# Patient Record
Sex: FEMALE | Race: WHITE | NOT HISPANIC OR LATINO | Employment: OTHER | URBAN - METROPOLITAN AREA
[De-identification: names, ages, dates, MRNs, and addresses within clinical notes are randomized per-mention and may not be internally consistent; named-entity substitution may affect disease eponyms.]

---

## 2017-02-15 ENCOUNTER — OFFICE VISIT (OUTPATIENT)
Dept: GYNECOLOGIC ONCOLOGY | Facility: CLINIC | Age: 67
End: 2017-02-15
Payer: MEDICARE

## 2017-02-15 ENCOUNTER — TELEPHONE (OUTPATIENT)
Dept: GYNECOLOGIC ONCOLOGY | Facility: CLINIC | Age: 67
End: 2017-02-15

## 2017-02-15 ENCOUNTER — LAB VISIT (OUTPATIENT)
Dept: LAB | Facility: HOSPITAL | Age: 67
End: 2017-02-15
Attending: OBSTETRICS & GYNECOLOGY
Payer: MEDICARE

## 2017-02-15 VITALS
BODY MASS INDEX: 22.31 KG/M2 | WEIGHT: 130 LBS | DIASTOLIC BLOOD PRESSURE: 70 MMHG | SYSTOLIC BLOOD PRESSURE: 156 MMHG | HEART RATE: 68 BPM

## 2017-02-15 DIAGNOSIS — R97.1 ELEVATED CANCER ANTIGEN 125 (CA-125): Primary | ICD-10-CM

## 2017-02-15 DIAGNOSIS — Z91.041 ALLERGY TO CONTRAST MEDIA (USED FOR DIAGNOSTIC X-RAYS): ICD-10-CM

## 2017-02-15 DIAGNOSIS — C48.2 PRIMARY PERITONEAL ADENOCARCINOMA: ICD-10-CM

## 2017-02-15 DIAGNOSIS — C48.2 PRIMARY PERITONEAL ADENOCARCINOMA: Primary | ICD-10-CM

## 2017-02-15 DIAGNOSIS — Z12.31 SCREENING MAMMOGRAM, ENCOUNTER FOR: ICD-10-CM

## 2017-02-15 DIAGNOSIS — Z91.041 ALLERGY TO CONTRAST MEDIA (USED FOR DIAGNOSTIC X-RAYS): Primary | ICD-10-CM

## 2017-02-15 DIAGNOSIS — Z01.419 WELL WOMAN EXAM WITH ROUTINE GYNECOLOGICAL EXAM: ICD-10-CM

## 2017-02-15 LAB — CANCER AG125 SERPL-ACNC: 56 U/ML

## 2017-02-15 PROCEDURE — 99213 OFFICE O/P EST LOW 20 MIN: CPT | Mod: PBBFAC | Performed by: OBSTETRICS & GYNECOLOGY

## 2017-02-15 PROCEDURE — 99999 PR PBB SHADOW E&M-EST. PATIENT-LVL III: CPT | Mod: PBBFAC,,, | Performed by: OBSTETRICS & GYNECOLOGY

## 2017-02-15 PROCEDURE — 99214 OFFICE O/P EST MOD 30 MIN: CPT | Mod: S$PBB,,, | Performed by: OBSTETRICS & GYNECOLOGY

## 2017-02-15 RX ORDER — PREDNISONE 50 MG/1
50 TABLET ORAL
Qty: 3 TABLET | Refills: 0 | OUTPATIENT
Start: 2017-02-15 | End: 2017-02-15 | Stop reason: SDUPTHER

## 2017-02-15 RX ORDER — PREDNISONE 50 MG/1
50 TABLET ORAL DAILY
Qty: 3 TABLET | Refills: 0 | OUTPATIENT
Start: 2017-02-15 | End: 2017-02-16

## 2017-02-15 RX ORDER — DIPHENHYDRAMINE HCL 25 MG
50 CAPSULE ORAL EVERY 6 HOURS PRN
Refills: 0 | Status: CANCELLED | COMMUNITY
Start: 2017-02-15

## 2017-02-15 NOTE — MR AVS SNAPSHOT
[facility] - GYN Oncology  [address]  [facility] [address]  Phone: [phone]                  Murlene Mary Haase   2/15/2017 8:45 AM   Office Visit    Description:  Female : 1950   Provider:  Juan Foss MD   Department:  [facility] - GYN Oncology           Reason for Visit     Primary peritoneal adenocarcinoma           Diagnoses this Visit        Comments    Primary peritoneal adenocarcinoma    -  Primary     Well woman exam with routine gynecological exam         Screening mammogram, encounter for                To Do List           Future Appointments        Provider Department Dept Phone    2/15/2017 8:45 AM Juan Foss MD [facility] - GYN Oncology [phone]      Goals (5 Years of Data)     None      Follow-Up and Disposition     Return in about 3 months (around 5/15/2017).      [facility]     [facility] On Call Nurse Care Line -  Assistance  Registered nurses in the [facility] On Call Center provide clinical advisement, health education, appointment booking, and other advisory services.  Call for this free service at [phone].             Medications                Verify that the below list of medications is an accurate representation of the medications you are currently taking.  If none reported, the list may be blank. If incorrect, please contact your healthcare provider. Carry this list with you in case of emergency.           Current Medications     alprazolam (XANAX) 0.5 MG tablet Take 0.5 mg by mouth continuous prn.    esomeprazole (NEXIUM) 40 MG capsule Take 40 mg by mouth continuous prn.           Clinical Reference Information           Your Vitals Were     BP Pulse Weight BMI       156/70 68 59 kg (130 lb) 22.31 kg/m2       Blood Pressure          Most Recent Value    BP  (!)  156/70      Allergies as of 2/15/2017     Iodinated Contrast Media - Iv Dye      Immunizations Administered on Date of Encounter - 2/15/2017     None      Orders Placed During  Today's Visit     Future Labs/Procedures Expected by Expires      2/15/2017 4/16/2018      Language Assistance Services     ATTENTION: Language assistance services are available, free of charge. Please call [phone].      ATENCIÓN: Si brie baez, tiene a lau disposición servicios gratuitos de asistencia lingüística. Llame al [phone].     CHÚ Ý: N?u b?n nói Ti?ng Vi?t, có các d?ch v? h? tr? ngôn ng? mi?n phí dành cho b?n. G?i s? [phone].         [facility] - GYN On[facility] complies with applicable Federal civil rights laws and does not discriminate on the basis of race, color, national origin, age, disability, or sex.

## 2017-02-15 NOTE — TELEPHONE ENCOUNTER
Called the patient to inform her that her  increased to 56. Her last CT scan was in June 2016 when her  was 33, and her scan was negative. Informed her that we will repeat another CT c/a/p for further evaluation. Che will call her to schedule appointment.

## 2017-02-16 ENCOUNTER — TELEPHONE (OUTPATIENT)
Dept: GYNECOLOGIC ONCOLOGY | Facility: CLINIC | Age: 67
End: 2017-02-16

## 2017-02-16 NOTE — TELEPHONE ENCOUNTER
Spoke with pt. Pt is aware she is to take predisone 50 mg 13 hrs, 7 hrs, 1 hr before CT Scan and benadryl 1 hr before CT Scan. Pt advised to fast four hours before scan and to arrive 2 hrs before scan, she is aware of location for labs and Ct scan. She says thank you

## 2017-02-20 ENCOUNTER — HOSPITAL ENCOUNTER (OUTPATIENT)
Dept: RADIOLOGY | Facility: HOSPITAL | Age: 67
Discharge: HOME | End: 2017-02-20
Attending: OBSTETRICS & GYNECOLOGY
Payer: MEDICARE

## 2017-02-20 DIAGNOSIS — R97.1 ELEVATED CANCER ANTIGEN 125 (CA-125): ICD-10-CM

## 2017-02-20 PROCEDURE — 25500020 PHARM REV CODE 255: Performed by: OBSTETRICS & GYNECOLOGY

## 2017-02-20 PROCEDURE — 71260 CT THORAX DX C+: CPT | Mod: TC

## 2017-02-20 PROCEDURE — 74177 CT ABD & PELVIS W/CONTRAST: CPT | Mod: 26,GC,, | Performed by: RADIOLOGY

## 2017-02-20 PROCEDURE — 71260 CT THORAX DX C+: CPT | Mod: 26,GC,, | Performed by: RADIOLOGY

## 2017-02-20 PROCEDURE — 74177 CT ABD & PELVIS W/CONTRAST: CPT | Mod: TC

## 2017-02-20 RX ADMIN — IOHEXOL 75 ML: 350 INJECTION, SOLUTION INTRAVENOUS at 05:02

## 2017-02-20 RX ADMIN — IOHEXOL 15 ML: 350 INJECTION, SOLUTION INTRAVENOUS at 04:02

## 2017-02-20 RX ADMIN — IOHEXOL 15 ML: 350 INJECTION, SOLUTION INTRAVENOUS at 03:02

## 2017-02-22 ENCOUNTER — TELEPHONE (OUTPATIENT)
Dept: GYNECOLOGIC ONCOLOGY | Facility: CLINIC | Age: 67
End: 2017-02-22

## 2017-02-22 NOTE — TELEPHONE ENCOUNTER
Patient informed of CT scan. I told her I would talk with Dr. Rivas about the possible intussusception. If this needs to be addressed with surgery, then may need a 2nd tumor debulking.

## 2017-02-22 NOTE — TELEPHONE ENCOUNTER
----- Message from Jenny Salazar sent at 2/21/2017  4:04 PM CST -----  Murlene Haase is calling for ct scan results/pt can be reached at        [facility]# [id number]

## 2017-02-22 NOTE — TELEPHONE ENCOUNTER
Spoke with pt. She is aware Dr. Foss is in clinic this morning, will forward message regarding ct results to physician. Pt voiced understanding

## 2017-02-24 DIAGNOSIS — C48.2 PRIMARY PERITONEAL ADENOCARCINOMA: Primary | ICD-10-CM

## 2017-02-24 DIAGNOSIS — R97.1 ELEVATED CANCER ANTIGEN 125 (CA-125): ICD-10-CM

## 2017-03-01 ENCOUNTER — TELEPHONE (OUTPATIENT)
Dept: GYNECOLOGIC ONCOLOGY | Facility: CLINIC | Age: 67
End: 2017-03-01

## 2017-03-01 ENCOUNTER — HOSPITAL ENCOUNTER (OUTPATIENT)
Dept: RADIOLOGY | Facility: HOSPITAL | Age: 67
Discharge: HOME | End: 2017-03-01
Attending: OBSTETRICS & GYNECOLOGY
Payer: MEDICARE

## 2017-03-01 VITALS — WEIGHT: 129.88 LBS | BODY MASS INDEX: 22.17 KG/M2 | HEIGHT: 64 IN

## 2017-03-01 DIAGNOSIS — R97.1 ELEVATED CANCER ANTIGEN 125 (CA-125): ICD-10-CM

## 2017-03-01 DIAGNOSIS — C48.2 PRIMARY PERITONEAL ADENOCARCINOMA: ICD-10-CM

## 2017-03-01 PROCEDURE — A9552 F18 FDG: HCPCS

## 2017-03-01 PROCEDURE — 78815 PET IMAGE W/CT SKULL-THIGH: CPT | Mod: 26,PI,, | Performed by: RADIOLOGY

## 2017-03-01 NOTE — TELEPHONE ENCOUNTER
Patient and daughter informed of PET scan results that show multiple area of katia involvement.   Will need to come to discuss standard Taxol and carbo vs  clinical trial.

## 2017-03-01 NOTE — TELEPHONE ENCOUNTER
----- Message from Jenny Salazar sent at 3/1/2017  2:27 PM CST -----  Murlene Haase wants to know if she can find clinical trials information on a particular web site?/pt can be reached at        [facility]# [id number]

## 2017-03-01 NOTE — TELEPHONE ENCOUNTER
Spoke with pt. Per Dr. Foss, pt is aware she can go to [facility] for information on clinical trials. Pt says thank you

## 2017-03-01 NOTE — TELEPHONE ENCOUNTER
----- Message from Juan Foss MD sent at 3/1/2017  2:04 PM CST -----  Patient and daughter informed of PET scan results that show multiple area of katia involvement.   Will need to come to discuss standard Taxol and carbo vs  clinical trial.

## 2017-03-07 ENCOUNTER — OFFICE VISIT (OUTPATIENT)
Dept: GYNECOLOGIC ONCOLOGY | Facility: CLINIC | Age: 67
End: 2017-03-07
Payer: MEDICARE

## 2017-03-07 VITALS
DIASTOLIC BLOOD PRESSURE: 77 MMHG | BODY MASS INDEX: 22.44 KG/M2 | WEIGHT: 131.44 LBS | SYSTOLIC BLOOD PRESSURE: 142 MMHG | HEART RATE: 88 BPM | HEIGHT: 64 IN

## 2017-03-07 DIAGNOSIS — C48.2 PRIMARY PERITONEAL ADENOCARCINOMA: Primary | ICD-10-CM

## 2017-03-07 DIAGNOSIS — R97.1 ELEVATED CANCER ANTIGEN 125 (CA-125): ICD-10-CM

## 2017-03-07 PROCEDURE — 99213 OFFICE O/P EST LOW 20 MIN: CPT | Mod: PBBFAC | Performed by: OBSTETRICS & GYNECOLOGY

## 2017-03-07 PROCEDURE — 99214 OFFICE O/P EST MOD 30 MIN: CPT | Mod: S$PBB,,, | Performed by: OBSTETRICS & GYNECOLOGY

## 2017-03-07 PROCEDURE — 99999 PR PBB SHADOW E&M-EST. PATIENT-LVL III: CPT | Mod: PBBFAC,,, | Performed by: OBSTETRICS & GYNECOLOGY

## 2017-03-07 NOTE — LETTER
March 9, 2017        Tana Deutsch MD  [address]  [place] [address]             [facility] - GYN Oncology  [address]  [place] [address]  Phone: [phone]   Patient: Murlene Mary Haase   MR Number: [id number]   YOB: 1950   Date of Visit: 3/7/2017       Dear Dr. Deutsch:    Thank you for referring Murlene Haase to me for evaluation. Attached you will find relevant portions of my assessment and plan of care.    If you have questions, please do not hesitate to call me. I look forward to following Murlene Haase along with you.    Sincerely,      Juan Foss MD            CC  No Recipients    Enclosure

## 2017-03-07 NOTE — PROGRESS NOTES
Subjective:       Patient ID: Murlene Mary Haase is a 66 y.o. female.    Chief Complaint: Primary peritoneal adenocarcinoma (Discuss chemotherapy )    HPI   Patient comes in today to discuss her recurrence of PPC.   She has had a mildly elevated  in the 30s with negative scanning.    in Feb 2017 was 56. CT scan showed possible intussusception and Pelvis:  2.0 x 0.9 cm soft tissue attenuating focus in the anterior mid pelvis that abuts bowel, and is present on arterial/delayed phases.     PET was done and shows adenopathy as well.     She has no signs of bowel obstruction.     She comes in with her family to discuss standard chemotherapy vs .     Mammogram : Feb 24, 2017: normal. [facility]   Colonoscopy: Aug 11, 2016: inflammation and diverticulosis  EGD: Aug 11, 2016: normal       Her oncologic history is:    She was taken to the operating room on July 25, 2013 for preoperative diagnosis consistent with ovarian cancer. 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 At that time she underwent an omentectomy and bilateral salpingo-oophorectomy however she had bulky residual disease along the diaphragms as well as paracolic gutters and pelvis.    She was then treated with 3 cycles of dose dense paclitaxel and carboplatin. A CT scan obtained after this showed the following:    Subcentimeter hepatic lesions, too small to accurately characterize.    Subtle, increased soft tissue attenuation along the periphery of the right hepatic lobe that is nonspecific but is favored to represent a prominent right hemidiaphragm. Peritoneal thickening is thought to be less likely but cannot be entirely excluded   and correlation with prior imaging studies is advised if available.     Large amount of stool throughout entire colon.    L1 vertebral body compression fracture of unknown chronicity.   Small pericardial effusion.    She then received 2 more cycles of dose dense  paclitaxel and carboplatin and then received one cycle of standard Taxol and carboplatin. Her last chemotherapy was December 26, 2013.    At the completion of 6 cycles of postoperative Taxol and carboplatin, her  was 7 and her CT scan of the abdomen and pelvis showed no evidence for disease.       In Feb 2016,  was 24.May 2016  was 35. June 2016: 33.   May 2016: CT scan abdomen and pelvis was negative.   August 2016:  was 31.  Nov 2016: C A 125 36  Feb 2017:  56.          Past Surgical History:   Procedure Laterality Date    APPENDECTOMY      BREAST BIOPSY      CHOLECYSTECTOMY      CYST REMOVAL      left ear, right breast    HYSTERECTOMY      vaginal/prolapse    OH REMOVAL OF OVARY/TUBE(S)      SALPINGOOPHORECTOMY  7/25/13    laparotomy     TONSILLECTOMY       Past Medical History:   Diagnosis Date    Arthritis     Elevated cancer antigen 125 (CA-125) 5/18/2016    Fibromyalgia     Gastritis     Neuromuscular disorder     Osteopenia     Primary peritoneal carcinomatosis 7/26/2013    Stage IIIC subopitimally debulked.     Well woman exam with routine gynecological exam 2/10/2015     Social History   Substance Use Topics    Smoking status: Never Smoker    Smokeless tobacco: None    Alcohol use No     Family History   Problem Relation Age of Onset    Cancer Mother      gastric cancer    Heart disease Father 69     MI    Cancer Paternal Aunt      gastric cancer    Ovarian cancer Neg Hx     Uterine cancer Neg Hx     Breast cancer Neg Hx     Colon cancer Neg Hx      Review of patient's allergies indicates:   Allergen Reactions    Iodinated contrast media - iv dye Hives, Itching and Rash     Treated with Benadryl previously       Current Outpatient Prescriptions:     alprazolam (XANAX) 0.5 MG tablet, Take 0.5 mg by mouth continuous prn., Disp: , Rfl:     esomeprazole (NEXIUM) 40 MG capsule, Take 40 mg by mouth continuous prn., Disp: , Rfl:     Review of Systems  "  Constitutional: Negative for chills, fatigue and fever.   Respiratory: Negative for cough, shortness of breath and wheezing.    Cardiovascular: Negative for chest pain, palpitations and leg swelling.   Gastrointestinal: Negative for abdominal pain, constipation, diarrhea, nausea and vomiting.   Genitourinary: Negative for difficulty urinating, dysuria, frequency, genital sores, hematuria, urgency, vaginal bleeding, vaginal discharge and vaginal pain.   Neurological: Negative for weakness.   Hematological: Negative for adenopathy. Does not bruise/bleed easily.   Psychiatric/Behavioral: The patient is not nervous/anxious.          Objective:     BP (!) 142/77  Pulse 88  Ht 5' 4" (1.626 m)  Wt 59.6 kg (131 lb 7.4 oz)  BMI 22.57 kg/m2    Physical Exam   Constitutional: She is oriented to person, place, and time. She appears well-developed and well-nourished.   HENT:   Head: Normocephalic and atraumatic.   Eyes: No scleral icterus.   Neck: Neck supple. No tracheal deviation present. No thyroid mass and no thyromegaly present.   Cardiovascular: Normal rate and regular rhythm.    Pulmonary/Chest: Effort normal and breath sounds normal. She has no wheezes.   Abdominal: Soft. She exhibits no distension and no mass. There is no hepatosplenomegaly. There is no tenderness. There is no rebound and no guarding.   Genitourinary:   Genitourinary Comments: Bimanual exam:  Vulva: no lesions. Normal appearance  Urethra: Normal size and location. No lesions  Bladder: No masses or tenderness.  Vagina: normal mucosa. No lesion  Cervix: absent.   Uterus: absent.  Adnexa: no masses.  Rectovaginal: No posterior cul de sac thickening or nodularity.  Rectal: no masses. Nontender. Normal tone.      Musculoskeletal: She exhibits no edema or tenderness.   Lymphadenopathy:     She has no cervical adenopathy.     She has no axillary adenopathy.        Right: No inguinal and no supraclavicular adenopathy present.        Left: No inguinal and " no supraclavicular adenopathy present.   Neurological: She is alert and oriented to person, place, and time.   Skin: Skin is warm and dry.   Psychiatric: She has a normal mood and affect. Her behavior is normal. Judgment and thought content normal.         Not performed.   Assessment:       1. Primary peritoneal adenocarcinoma    2. Elevated cancer antigen 125 (CA-125)        Plan:   Primary peritoneal adenocarcinoma  Options of starting standard taxol and carboplatin vs  trial of Olaparib vs Olaparib/cederanib vs carbo/taxol or c/gemcitabine or c/Doxil (MD preference).   I explained that this is a randomized clinical trial of 1:1:1 for the 3 arms.   Questions were answered.   She and her family met with Petra raya who reviewed the protocol in more detail.   She and her family with talk and she will get back in touch with me.   If she decides against the clinical trial, then she would want her chemotherapy in [place] with Dr. Tana Deutsch   O: [phone],  F: [phone].   Elevated cancer antigen 125 (CA-125)      Total encounter time of 30 minutes discussing options.

## 2017-03-07 NOTE — MR AVS SNAPSHOT
"    [facility] - GYN Oncology  [address]  [facility] [address]  Phone: [phone]                  Murlene Mary Haase   3/7/2017 9:45 AM   Office Visit    Description:  Female : 1950   Provider:  Juan Foss MD   Department:  [facility] - GYN Oncology           Reason for Visit     Primary peritoneal adenocarcinoma           Diagnoses this Visit        Comments    Primary peritoneal adenocarcinoma    -  Primary     Elevated cancer antigen 125 (CA-125)                To Do List           Future Appointments        Provider Department Dept Phone    2017 9:00 AM LAB, HEMONC SAME DAY [facility] [phone]    2017 10:30 AM Juan Foss MD [facility] Oncology [phone]      Goals (5 Years of Data)     None      [facility] On Call     [facility] On Call Nurse Care Line -  Assistance  Registered nurses in the [facility] On Call Center provide clinical advisement, health education, appointment booking, and other advisory services.  Call for this free service at [phone].             Medications                Verify that the below list of medications is an accurate representation of the medications you are currently taking.  If none reported, the list may be blank. If incorrect, please contact your healthcare provider. Carry this list with you in case of emergency.           Current Medications     alprazolam (XANAX) 0.5 MG tablet Take 0.5 mg by mouth continuous prn.    esomeprazole (NEXIUM) 40 MG capsule Take 40 mg by mouth continuous prn.           Clinical Reference Information           Your Vitals Were     BP Pulse Height Weight BMI    142/77 88 5' 4" (1.626 m) 59.6 kg (131 lb 7.4 oz) 22.57 kg/m2      Blood Pressure          Most Recent Value    BP  (!)  142/77      Allergies as of 3/7/2017     Iodinated Contrast Media - Iv Dye      Immunizations Administered on Date of Encounter - 3/7/2017     None      Language Assistance Services     ATTENTION: " Language assistance services are available, free of charge. Please call [phone].      ATENCIÓN: Si habla nestor, tiene a lau disposición servicios gratuitos de asistencia lingüística. Llame al [phone].     CHÚ Ý: N?u b?n nói Ti?ng Vi?t, có các d?ch v? h? tr? ngôn ng? mi?n phí dành cho b?n. G?i s? [phone].         [facility] - GYN On[facility] complies with applicable Federal civil rights laws and does not discriminate on the basis of race, color, national origin, age, disability, or sex.

## 2017-03-08 ENCOUNTER — TELEPHONE (OUTPATIENT)
Dept: GYNECOLOGIC ONCOLOGY | Facility: CLINIC | Age: 67
End: 2017-03-08

## 2017-03-08 NOTE — TELEPHONE ENCOUNTER
Spoke with pt, she called to update Dr. Foss regarding chemo. She has decided to do chemo in [place]. She states she done chemo there previously, she will like to speak with Dr. Foss. Pt is aware message kolby be forward to physician, she voiced understanding

## 2017-03-09 ENCOUNTER — TELEPHONE (OUTPATIENT)
Dept: GYNECOLOGIC ONCOLOGY | Facility: CLINIC | Age: 67
End: 2017-03-09

## 2017-03-09 NOTE — TELEPHONE ENCOUNTER
Spoke with pt. Pt is aware we are waiting Gene[employer] tests kits, pt advised when tests kits come in I will contact her to setup a time to come in to do lab work. She voiced understanding

## 2017-03-09 NOTE — TELEPHONE ENCOUNTER
Per Dr. Foss, fax pt progress note to Dr. Tana Deutsch at [phone] ph [phone].    Spoke with pt. Pt is aware progress note has been  to Dr. Tana Deutsch office.

## 2017-03-09 NOTE — TELEPHONE ENCOUNTER
----- Message from Jenny Salazar sent at 3/9/2017  9:20 AM CST -----  Murlene Haase said she spoke to  or nurse at [facility]/pt said they will need her medical records to be sent to specialist/     Ms Haase can be reached at  or

## 2017-03-17 ENCOUNTER — TELEPHONE (OUTPATIENT)
Dept: GYNECOLOGIC ONCOLOGY | Facility: CLINIC | Age: 67
End: 2017-03-17

## 2017-03-17 NOTE — TELEPHONE ENCOUNTER
Spoke with pt. She is aware gene dx test kits we delivered. Pt will come in Thursday 3/23 at 1:00 for blood draw

## 2017-04-13 ENCOUNTER — TELEPHONE (OUTPATIENT)
Dept: GYNECOLOGIC ONCOLOGY | Facility: CLINIC | Age: 67
End: 2017-04-13

## 2017-04-17 ENCOUNTER — DOCUMENTATION ONLY (OUTPATIENT)
Dept: GYNECOLOGIC ONCOLOGY | Facility: CLINIC | Age: 67
End: 2017-04-17

## 2017-04-17 DIAGNOSIS — C48.2 PRIMARY PERITONEAL ADENOCARCINOMA: Primary | ICD-10-CM

## 2017-04-17 RX ORDER — DIPHENHYDRAMINE HCL 25 MG
CAPSULE ORAL
Qty: 2 CAPSULE | Refills: 0 | Status: SHIPPED | OUTPATIENT
Start: 2017-04-17 | End: 2017-05-09

## 2017-04-17 RX ORDER — PREDNISONE 50 MG/1
TABLET ORAL
Qty: 3 TABLET | Refills: 0 | Status: SHIPPED | OUTPATIENT
Start: 2017-04-17 | End: 2017-05-09

## 2017-04-17 NOTE — PROGRESS NOTES
I spoke with patient. She is to start her 3rd cycle of taxol and carboplatin on 4/24/2017. She has an appt with me for 5/9/2017. Will get CT and labs before I see her.

## 2017-05-01 ENCOUNTER — TELEPHONE (OUTPATIENT)
Dept: RADIOLOGY | Facility: HOSPITAL | Age: 67
End: 2017-05-01

## 2017-05-02 ENCOUNTER — HOSPITAL ENCOUNTER (OUTPATIENT)
Dept: RADIOLOGY | Facility: HOSPITAL | Age: 67
Discharge: HOME | End: 2017-05-02
Attending: OBSTETRICS & GYNECOLOGY
Payer: MEDICARE

## 2017-05-02 DIAGNOSIS — C48.2 PRIMARY PERITONEAL ADENOCARCINOMA: ICD-10-CM

## 2017-05-02 LAB
CREAT SERPL-MCNC: 0.2 MG/DL (ref 0.5–1.4)
CREAT SERPL-MCNC: 0.4 MG/DL (ref 0.5–1.4)
SAMPLE: ABNORMAL
SAMPLE: ABNORMAL

## 2017-05-02 PROCEDURE — 74177 CT ABD & PELVIS W/CONTRAST: CPT | Mod: TC

## 2017-05-02 PROCEDURE — 25500020 PHARM REV CODE 255: Performed by: OBSTETRICS & GYNECOLOGY

## 2017-05-02 PROCEDURE — 74177 CT ABD & PELVIS W/CONTRAST: CPT | Mod: 26,GC,, | Performed by: RADIOLOGY

## 2017-05-02 RX ADMIN — IOHEXOL 75 ML: 350 INJECTION, SOLUTION INTRAVENOUS at 10:05

## 2017-05-02 RX ADMIN — IOHEXOL 15 ML: 350 INJECTION, SOLUTION INTRAVENOUS at 08:05

## 2017-05-02 RX ADMIN — IOHEXOL 15 ML: 350 INJECTION, SOLUTION INTRAVENOUS at 07:05

## 2017-05-03 ENCOUNTER — TELEPHONE (OUTPATIENT)
Dept: GYNECOLOGIC ONCOLOGY | Facility: CLINIC | Age: 67
End: 2017-05-03

## 2017-05-03 NOTE — TELEPHONE ENCOUNTER
----- Message from Juan Foss MD sent at 5/3/2017  1:10 PM CDT -----  Patient informed of normal  and CT scan shows a response. She will need 3 more cycles of chemotherapy.

## 2017-05-09 ENCOUNTER — OFFICE VISIT (OUTPATIENT)
Dept: GYNECOLOGIC ONCOLOGY | Facility: CLINIC | Age: 67
End: 2017-05-09
Payer: MEDICARE

## 2017-05-09 VITALS
DIASTOLIC BLOOD PRESSURE: 79 MMHG | BODY MASS INDEX: 22.71 KG/M2 | WEIGHT: 132.25 LBS | SYSTOLIC BLOOD PRESSURE: 166 MMHG | HEART RATE: 73 BPM

## 2017-05-09 DIAGNOSIS — C48.2 PRIMARY PERITONEAL ADENOCARCINOMA: Primary | ICD-10-CM

## 2017-05-09 PROCEDURE — 99999 PR PBB SHADOW E&M-EST. PATIENT-LVL III: CPT | Mod: PBBFAC,,, | Performed by: OBSTETRICS & GYNECOLOGY

## 2017-05-09 PROCEDURE — 99214 OFFICE O/P EST MOD 30 MIN: CPT | Mod: S$PBB,,, | Performed by: OBSTETRICS & GYNECOLOGY

## 2017-05-09 PROCEDURE — 99213 OFFICE O/P EST LOW 20 MIN: CPT | Mod: PBBFAC | Performed by: OBSTETRICS & GYNECOLOGY

## 2017-05-09 NOTE — LETTER
May 9, 2017        Tana Deutsch MD  [address]  [place] [address]             [facility] - GYN Oncology  [address]  [place] [address]  Phone: [phone]   Patient: Murlene Mary Haase   MR Number: [id number]   YOB: 1950   Date of Visit: 5/9/2017       Dear Dr. Deutsch:    Thank you for referring Murlene Haase to me for evaluation. Below are the relevant portions of my assessment and plan of care.       1. Primary peritoneal adenocarcinoma           If you have questions, please do not hesitate to call me. I look forward to following Ruiz along with you.    Sincerely,      Juan Foss MD           CC  No Recipients

## 2017-05-09 NOTE — MR AVS SNAPSHOT
[facility] - GYN Oncology  [address]  [facility] [address]  Phone: [phone]                  Murlene Mary Haase   2017 10:30 AM   Office Visit    Description:  Female : 1950   Provider:  Juan Foss MD   Department:  [facility] - GYN Oncology           Reason for Visit     Peritoneal Cancer           Diagnoses this Visit        Comments    Primary peritoneal adenocarcinoma    -  Primary            To Do List           Goals (5 Years of Data)     None      [facility] On Call     [facility] On Call Nurse Care Line -  Assistance  Unless otherwise directed by your provider, please contact [facility] On-Call, our nurse care line that is available for  assistance.     Registered nurses in the [facility] On Call Center provide: appointment scheduling, clinical advisement, health education, and other advisory services.  Call: [phone] (toll free)               Medications           STOP taking these medications     predniSONE (DELTASONE) 50 MG Tab Take one tablet 13hours, then 7 hours and 1 hour before ct scan    diphenhydrAMINE (BENADRYL) 25 mg capsule Take 2 tablets 1 hour before CT scan.           Verify that the below list of medications is an accurate representation of the medications you are currently taking.  If none reported, the list may be blank. If incorrect, please contact your healthcare provider. Carry this list with you in case of emergency.           Current Medications     alprazolam (XANAX) 0.5 MG tablet Take 0.5 mg by mouth continuous prn.    esomeprazole (NEXIUM) 40 MG capsule Take 40 mg by mouth continuous prn.           Clinical Reference Information           Your Vitals Were     BP Pulse Weight BMI       166/79 73 60 kg (132 lb 4.4 oz) 22.71 kg/m2       Blood Pressure          Most Recent Value    BP  (!)  166/79      Allergies as of 2017     Iodinated Contrast Media - Oral And Iv Dye      Immunizations Administered on Date of Encounter - 2017     None       Orders Placed During Today's Visit     Future Labs/Procedures Expected by Expires      5/9/2017 5/9/2018    NM PET CT Routine Skull to Mid Thigh  5/9/2017 5/9/2018      Language Assistance Services     ATTENTION: Language assistance services are available, free of charge. Please call [phone].      ATENCIÓN: Si habla español, tiene a lau disposición servicios gratuitos de asistencia lingüística. Llame al [phone].     CHÚ Ý: N?u b?n nói Ti?ng Vi?t, có các d?ch v? h? tr? ngôn ng? mi?n phí dành cho b?n. G?i s? [phone].         [facility] On[facility] complies with applicable Federal civil rights laws and does not discriminate on the basis of race, color, national origin, age, disability, or sex.

## 2017-05-09 NOTE — PROGRESS NOTES
Subjective:       Patient ID: Murlene Mary Haase is a 67 y.o. female.    Chief Complaint: Peritoneal Cancer (3mth)    HPI   Patient comes in today after 3 cycles of reinduction taxol and carboplatin. Receiving chemotherapy with Dr. Tana Deutsch. Patient with a BRCA 2 mutation.     Recent CT scan showed decreased size of right pelvic sidewall node compared to 2/20/17.  No new disease identified.  Numerous subcentimeter hypodensities throughout the liver too small to characterize, similar overall compared to prior.     is 11.     Had MRI of both breast in April 2017 for bilateral breast pain. MRI per patient was negative.    Mammogram : Feb 24, 2017: normal. [facility]   Colonoscopy: Aug 11, 2016: inflammation and diverticulosis  EGD: Aug 11, 2016: normal       Her oncologic history is:    She was taken to the operating room on July 25, 2013 for preoperative diagnosis consistent with ovarian cancer. 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 At that time she underwent an omentectomy and bilateral salpingo-oophorectomy however she had bulky residual disease along the diaphragms as well as paracolic gutters and pelvis.    She was then treated with 3 cycles of dose dense paclitaxel and carboplatin. A CT scan obtained after this showed the following:    Subcentimeter hepatic lesions, too small to accurately characterize.    Subtle, increased soft tissue attenuation along the periphery of the right hepatic lobe that is nonspecific but is favored to represent a prominent right hemidiaphragm. Peritoneal thickening is thought to be less likely but cannot be entirely excluded   and correlation with prior imaging studies is advised if available.     Large amount of stool throughout entire colon.    L1 vertebral body compression fracture of unknown chronicity.   Small pericardial effusion.    She then received 2 more cycles of dose dense paclitaxel and carboplatin and then  received one cycle of standard Taxol and carboplatin. Her last chemotherapy was December 26, 2013.    At the completion of 6 cycles of postoperative Taxol and carboplatin, her  was 7 and her CT scan of the abdomen and pelvis showed no evidence for disease.        In Feb 2016,  was 24.May 2016  was 35. June 2016: 33.   May 2016: CT scan abdomen and pelvis was negative.   August 2016:  was 31.  Nov 2016: C A 125 36  Feb 2017:  56. She has had a mildly elevated  in the 30s with negative scanning.    in Feb 2017 was 56. CT scan showed possible intussusception and Pelvis:  2.0 x 0.9 cm soft tissue attenuating focus in the anterior mid pelvis that abuts bowel, and is present on arterial/delayed phases.      PET was done and shows adenopathy as well.      Patient with a BRCA 2 mutation.       Review of Systems   Constitutional: Negative for chills, fatigue and fever.   Respiratory: Negative for cough, shortness of breath and wheezing.    Cardiovascular: Negative for chest pain, palpitations and leg swelling.   Gastrointestinal: Negative for abdominal pain, constipation, diarrhea, nausea and vomiting.   Genitourinary: Negative for difficulty urinating, dysuria, frequency, genital sores, hematuria, urgency, vaginal bleeding, vaginal discharge and vaginal pain.   Neurological: Negative for weakness.   Hematological: Negative for adenopathy. Does not bruise/bleed easily.   Psychiatric/Behavioral: The patient is not nervous/anxious.        Objective:     BP (!) 166/79  Pulse 73  Wt 60 kg (132 lb 4.4 oz)  BMI 22.71 kg/m2    Physical Exam   Constitutional: She is oriented to person, place, and time. She appears well-developed and well-nourished.   HENT:   Head: Normocephalic and atraumatic.   Eyes: No scleral icterus.   Neck: Neck supple. No tracheal deviation present. No thyroid mass and no thyromegaly present.   Cardiovascular: Normal rate and regular rhythm.    Pulmonary/Chest:  Effort normal and breath sounds normal. She has no wheezes.   Abdominal: Soft. She exhibits no distension and no mass. There is no hepatosplenomegaly. There is no tenderness. There is no rebound and no guarding.   Genitourinary:   Genitourinary Comments: Bimanual exam:  Vulva: no lesions. Normal appearance  Urethra: Normal size and location. No lesions  Bladder: No masses or tenderness.  Vagina: normal mucosa. No lesion  Cervix: absent.   Uterus: absent.  Adnexa: no masses.  Rectovaginal: Not performed     Musculoskeletal: She exhibits no edema or tenderness.   Lymphadenopathy:     She has no cervical adenopathy.     She has no axillary adenopathy.        Right: No inguinal and no supraclavicular adenopathy present.        Left: No inguinal and no supraclavicular adenopathy present.   Neurological: She is alert and oriented to person, place, and time.   Skin: Skin is warm and dry.   Psychiatric: She has a normal mood and affect. Her behavior is normal. Judgment and thought content normal.       Assessment:       1. Primary peritoneal adenocarcinoma        Plan:   Primary peritoneal adenocarcinoma  Will plan to complete 6 cycles of taxol and carboplatin and RTC with   -     NM PET CT Routine Skull to Mid Thigh; Future; Expected date: 5/9/17  -     ; Future; Expected date: 5/9/17      She knows to call when she starts cycle 6.     I discussed with her and her family the use of Niraparib after completing chemotherapy. I told them this is FDA approved for patients with recurrent ovarian/primary peritoneal cancer with CR or DE.

## 2017-05-09 NOTE — LETTER
May 9, 2017        Tana Deutsch MD  [address]  [place] [address]             [facility] - GYN Oncology  [address]  [place] [address]  Phone: [phone]   Patient: Murlene Mary Haase   MR Number: [id number]   YOB: 1950   Date of Visit: 5/9/2017       Dear Dr. Deutsch:    Thank you for referring Murlene Haase to me for evaluation. Attached you will find relevant portions of my assessment and plan of care.    If you have questions, please do not hesitate to call me. I look forward to following Murlene Haase along with you.    Sincerely,      Juan Foss MD            CC  No Recipients    Enclosure

## 2017-05-24 ENCOUNTER — DOCUMENTATION ONLY (OUTPATIENT)
Dept: GYNECOLOGIC ONCOLOGY | Facility: CLINIC | Age: 67
End: 2017-05-24

## 2017-06-15 ENCOUNTER — TELEPHONE (OUTPATIENT)
Dept: GYNECOLOGIC ONCOLOGY | Facility: CLINIC | Age: 67
End: 2017-06-15

## 2017-06-15 DIAGNOSIS — C48.2 PRIMARY PERITONEAL ADENOCARCINOMA: Primary | ICD-10-CM

## 2017-06-15 RX ORDER — DIPHENHYDRAMINE HCL 25 MG
CAPSULE ORAL
Qty: 2 CAPSULE | Refills: 0 | Status: SHIPPED | OUTPATIENT
Start: 2017-06-15 | End: 2021-03-25

## 2017-06-15 RX ORDER — PREDNISONE 50 MG/1
TABLET ORAL
Qty: 3 TABLET | Refills: 0 | Status: SHIPPED | OUTPATIENT
Start: 2017-06-15 | End: 2021-03-25

## 2017-07-17 ENCOUNTER — TELEPHONE (OUTPATIENT)
Dept: ADMINISTRATIVE | Facility: HOSPITAL | Age: 67
End: 2017-07-17

## 2017-07-18 ENCOUNTER — HOSPITAL ENCOUNTER (OUTPATIENT)
Dept: RADIOLOGY | Facility: HOSPITAL | Age: 67
Discharge: HOME | End: 2017-07-18
Attending: OBSTETRICS & GYNECOLOGY
Payer: MEDICARE

## 2017-07-18 DIAGNOSIS — C48.2 PRIMARY PERITONEAL ADENOCARCINOMA: ICD-10-CM

## 2017-07-18 LAB
CREAT SERPL-MCNC: 0.6 MG/DL (ref 0.5–1.4)
SAMPLE: NORMAL

## 2017-07-18 PROCEDURE — 74177 CT ABD & PELVIS W/CONTRAST: CPT | Mod: 26,GC,, | Performed by: RADIOLOGY

## 2017-07-18 PROCEDURE — 71260 CT THORAX DX C+: CPT | Mod: TC

## 2017-07-18 PROCEDURE — 71260 CT THORAX DX C+: CPT | Mod: 26,,, | Performed by: RADIOLOGY

## 2017-07-18 PROCEDURE — 74177 CT ABD & PELVIS W/CONTRAST: CPT | Mod: TC

## 2017-07-18 PROCEDURE — 25500020 PHARM REV CODE 255: Performed by: OBSTETRICS & GYNECOLOGY

## 2017-07-18 RX ADMIN — IOHEXOL 75 ML: 350 INJECTION, SOLUTION INTRAVENOUS at 12:07

## 2017-07-21 ENCOUNTER — OFFICE VISIT (OUTPATIENT)
Dept: GYNECOLOGIC ONCOLOGY | Facility: CLINIC | Age: 67
End: 2017-07-21
Payer: MEDICARE

## 2017-07-21 VITALS
HEART RATE: 77 BPM | HEIGHT: 64 IN | DIASTOLIC BLOOD PRESSURE: 78 MMHG | BODY MASS INDEX: 22.77 KG/M2 | SYSTOLIC BLOOD PRESSURE: 158 MMHG | WEIGHT: 133.38 LBS

## 2017-07-21 DIAGNOSIS — C48.2 PRIMARY PERITONEAL ADENOCARCINOMA: Primary | ICD-10-CM

## 2017-07-21 PROCEDURE — 1159F MED LIST DOCD IN RCRD: CPT | Mod: ,,, | Performed by: OBSTETRICS & GYNECOLOGY

## 2017-07-21 PROCEDURE — 99999 PR PBB SHADOW E&M-EST. PATIENT-LVL III: CPT | Mod: PBBFAC,,, | Performed by: OBSTETRICS & GYNECOLOGY

## 2017-07-21 PROCEDURE — 99214 OFFICE O/P EST MOD 30 MIN: CPT | Mod: S$PBB,,, | Performed by: OBSTETRICS & GYNECOLOGY

## 2017-07-21 PROCEDURE — 99213 OFFICE O/P EST LOW 20 MIN: CPT | Mod: PBBFAC | Performed by: OBSTETRICS & GYNECOLOGY

## 2017-07-21 PROCEDURE — 1126F AMNT PAIN NOTED NONE PRSNT: CPT | Mod: ,,, | Performed by: OBSTETRICS & GYNECOLOGY

## 2017-07-21 NOTE — PROGRESS NOTES
Subjective:       Patient ID: Murlene Mary Haase is a 67 y.o. female.    Chief Complaint: Follow-up (Review Ct Scan )    HPI     Patient comes in today after completing 6 cycles of taxol and carboplatin on 6/29/2017 for recurrent ovarian cancer.  Received chemotherapy with Dr. Tana Deutsch.  CT scan after completion of 6 cycles of taxol and carboplatin shows stable right pelvic lymph node index lesion measuring 0.7 cm with some prominent lymph nodes in the jessenia hepatis, unchanged from prior.  There is no new lymphadenopathy identified.   is 8.            Mammogram : Feb 24, 2017: normal. [facility]. Had MRI of both breast in April 2017 for bilateral breast pain. MRI per patient was negative.  Colonoscopy: Aug 11, 2016: inflammation and diverticulosis  EGD: Aug 11, 2016: normal       Her oncologic history is:    She was taken to the operating room on July 25, 2013 for preoperative diagnosis consistent with ovarian cancer. 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 At that time she underwent an omentectomy and bilateral salpingo-oophorectomy however she had bulky residual disease along the diaphragms as well as paracolic gutters and pelvis.    She was then treated with 3 cycles of dose dense paclitaxel and carboplatin. A CT scan obtained after this showed the following:    Subcentimeter hepatic lesions, too small to accurately characterize.    Subtle, increased soft tissue attenuation along the periphery of the right hepatic lobe that is nonspecific but is favored to represent a prominent right hemidiaphragm. Peritoneal thickening is thought to be less likely but cannot be entirely excluded   and correlation with prior imaging studies is advised if available.     Large amount of stool throughout entire colon.    L1 vertebral body compression fracture of unknown chronicity.   Small pericardial effusion.    She then received 2 more cycles of dose dense paclitaxel and  carboplatin and then received one cycle of standard Taxol and carboplatin. Her last chemotherapy was December 26, 2013.    At the completion of 6 cycles of postoperative Taxol and carboplatin, her  was 7 and her CT scan of the abdomen and pelvis showed no evidence for disease.        In Feb 2016,  was 24.May 2016  was 35. June 2016: 33.   May 2016: CT scan abdomen and pelvis was negative.   August 2016:  was 31.  Nov 2016: C A 125 36  Feb 2017:  56. She has had a mildly elevated  in the 30s with negative scanning.    in Feb 2017 was 56. CT scan showed possible intussusception and Pelvis:  2.0 x 0.9 cm soft tissue attenuating focus in the anterior mid pelvis that abuts bowel, and is present on arterial/delayed phases.      PET was done and shows adenopathy as well.   Completed 6 cycles of taxol and carboplatin on 6/29/2017 for recurrent ovarian cancer.  Received chemotherapy with Dr. Tana Deutsch.  CT scan after completion of 6 cycles of taxol and carboplatin shows stable right pelvic lymph node index lesion measuring 0.7 cm with some prominent lymph nodes in the jessenia hepatis, unchanged from prior.  There is no new lymphadenopathy identified.   is 8.      Patient with a BRCA 2 mutation.         Review of Systems   Constitutional: Negative for chills, fatigue and fever.   Respiratory: Negative for cough, shortness of breath and wheezing.    Cardiovascular: Negative for chest pain, palpitations and leg swelling.   Gastrointestinal: Positive for abdominal pain (Luq). Negative for constipation, diarrhea, nausea and vomiting.   Genitourinary: Negative for difficulty urinating, dysuria, frequency, genital sores, hematuria, urgency, vaginal bleeding, vaginal discharge and vaginal pain.   Musculoskeletal: Negative for gait problem.   Neurological: Negative for weakness and numbness.   Hematological: Negative for adenopathy. Does not bruise/bleed easily.    Psychiatric/Behavioral: The patient is not nervous/anxious.        Objective:      Physical Exam   Constitutional: She is oriented to person, place, and time. She appears well-developed and well-nourished.   HENT:   Head: Normocephalic and atraumatic.   Eyes: No scleral icterus.   Neck: Neck supple. No tracheal deviation present. No thyroid mass and no thyromegaly present.   Cardiovascular: Normal rate and regular rhythm.    Pulmonary/Chest: Effort normal and breath sounds normal. She has no wheezes.   Abdominal: Soft. She exhibits no distension and no mass. There is no hepatosplenomegaly. There is no tenderness. There is no rebound and no guarding.   Genitourinary:   Genitourinary Comments: Bimanual exam:  Vulva: no lesions. Normal appearance  Urethra: Normal size and location. No lesions  Bladder: No masses or tenderness.  Vagina: normal mucosa. No lesion  Cervix: absent.   Uterus: absent.  Adnexa: no masses.  Rectovaginal: Not performed     Musculoskeletal: She exhibits no edema or tenderness.   Lymphadenopathy:     She has no cervical adenopathy.     She has no axillary adenopathy.        Right: No inguinal and no supraclavicular adenopathy present.        Left: No inguinal and no supraclavicular adenopathy present.   Neurological: She is alert and oriented to person, place, and time.   Skin: Skin is warm and dry.   Psychiatric: She has a normal mood and affect. Her behavior is normal. Judgment and thought content normal.       Assessment:       1. Primary peritoneal adenocarcinoma        Plan:   Primary peritoneal adenocarcinoma  -     ; Future; Expected date: 10/21/2017

## 2017-07-25 ENCOUNTER — TELEPHONE (OUTPATIENT)
Dept: PHARMACY | Facility: CLINIC | Age: 67
End: 2017-07-25

## 2017-07-25 DIAGNOSIS — C48.2 PRIMARY PERITONEAL ADENOCARCINOMA: Primary | ICD-10-CM

## 2017-07-25 NOTE — PROGRESS NOTES
I discussed with patient and her family the use of Niraparib.   I have recommended this since it is FDA approved for BRCA patients who have had a recurrence. I discussed with them the less that 1% risk for MDS.   I explained that she will need weekly labs x 8 weeks then monthly.     I told them that I would send the Rx to the [facility] who will contact the patient.

## 2017-07-25 NOTE — TELEPHONE ENCOUNTER
[facility] received prescription for Zejula 300mg daily.  Patient has no active pharmacy insurance; confirmed with patient.     Patient's Estimated Creatinine Clearance:  74mL/min (based on Cr of 0.7 from 7/18/2017).  No dosage adjustment necessary.     Dosage appropriate based on therapy for Ovarian, fallopian tube, or primary peritoneal cancer, recurrent (maintenance treatment): Oral: 300 mg once daily, continue until disease progression or unacceptable toxicity ([place] 2016). Begin treatment no later than 8 weeks following the most recent platinum-containing regimen.    DDIs: medication list reviewed, none.

## 2017-07-28 DIAGNOSIS — C48.2 PRIMARY PERITONEAL ADENOCARCINOMA: Primary | ICD-10-CM

## 2017-08-01 ENCOUNTER — TELEPHONE (OUTPATIENT)
Dept: GYNECOLOGIC ONCOLOGY | Facility: CLINIC | Age: 67
End: 2017-08-01

## 2017-08-01 NOTE — TELEPHONE ENCOUNTER
Documentation Only:     Financial assistance approval: Together with Bud James  Effective 07/31/2017/ to 07/30/2018  Patient will receive medication from      Patient Id:[id number]     [phone]

## 2017-08-07 ENCOUNTER — TELEPHONE (OUTPATIENT)
Dept: GYNECOLOGIC ONCOLOGY | Facility: CLINIC | Age: 67
End: 2017-08-07

## 2017-08-07 NOTE — TELEPHONE ENCOUNTER
Patient is to receive Zejula sometime soon.   She has a dental appt and cardiology appt. These will be done by next week.     She will have CBC and CMP and have these faxed to me.     She is to call when she has completed these appts and is ready to start Zejula.     She will need CBC and CMP and magnesium weekly for 8 weeks and then monthly while taking Zejula.     She can have these labs done at [facility].

## 2017-08-18 ENCOUNTER — TELEPHONE (OUTPATIENT)
Dept: GYNECOLOGIC ONCOLOGY | Facility: CLINIC | Age: 67
End: 2017-08-18

## 2017-08-18 NOTE — TELEPHONE ENCOUNTER
Pt returned phone call. Pt states she is having dental work done and it will take up to a week or two. Pt states she will contact office when she is ready to zejula and schedule labs.

## 2017-08-18 NOTE — TELEPHONE ENCOUNTER
----- Message from Juan Foss MD sent at 8/7/2017  5:19 PM CDT -----  Patient is to receive Zejula sometime soon.   She has a dental appt and cardiology appt. These will be done by next week.   She will have CBC and CMP and have these faxed to me.     She is to call when she has completed these appts and is ready to start Zejula.     She will need CBC and CMP and magnesium weekly for 8 weeks and then monthly while taking Zejula.     She can have these labs done at [facility].

## 2017-09-25 ENCOUNTER — LAB VISIT (OUTPATIENT)
Dept: LAB | Facility: HOSPITAL | Age: 67
End: 2017-09-25
Attending: OBSTETRICS & GYNECOLOGY
Payer: MEDICARE

## 2017-09-25 ENCOUNTER — TELEPHONE (OUTPATIENT)
Dept: GYNECOLOGIC ONCOLOGY | Facility: CLINIC | Age: 67
End: 2017-09-25

## 2017-09-25 DIAGNOSIS — C48.2 PRIMARY PERITONEAL ADENOCARCINOMA: ICD-10-CM

## 2017-09-25 LAB
ALBUMIN SERPL BCP-MCNC: 4.1 G/DL
ALP SERPL-CCNC: 86 U/L
ALT SERPL W/O P-5'-P-CCNC: 30 U/L
ANION GAP SERPL CALC-SCNC: 8 MMOL/L
AST SERPL-CCNC: 37 U/L
BASOPHILS # BLD AUTO: 0.01 K/UL
BASOPHILS NFR BLD: 0.2 %
BILIRUB SERPL-MCNC: 0.4 MG/DL
BUN SERPL-MCNC: 15 MG/DL
CALCIUM SERPL-MCNC: 9.9 MG/DL
CHLORIDE SERPL-SCNC: 98 MMOL/L
CO2 SERPL-SCNC: 26 MMOL/L
CREAT SERPL-MCNC: 0.7 MG/DL
DIFFERENTIAL METHOD: ABNORMAL
EOSINOPHIL # BLD AUTO: 0.1 K/UL
EOSINOPHIL NFR BLD: 0.9 %
ERYTHROCYTE [DISTWIDTH] IN BLOOD BY AUTOMATED COUNT: 13.4 %
EST. GFR  (AFRICAN AMERICAN): >60 ML/MIN/1.73 M^2
EST. GFR  (NON AFRICAN AMERICAN): >60 ML/MIN/1.73 M^2
GLUCOSE SERPL-MCNC: 96 MG/DL
HCT VFR BLD AUTO: 36.7 %
HGB BLD-MCNC: 12.5 G/DL
LYMPHOCYTES # BLD AUTO: 1.8 K/UL
LYMPHOCYTES NFR BLD: 31.2 %
MCH RBC QN AUTO: 32.7 PG
MCHC RBC AUTO-ENTMCNC: 34.1 G/DL
MCV RBC AUTO: 96 FL
MONOCYTES # BLD AUTO: 0.4 K/UL
MONOCYTES NFR BLD: 7.8 %
NEUTROPHILS # BLD AUTO: 3.4 K/UL
NEUTROPHILS NFR BLD: 59.9 %
PLATELET # BLD AUTO: 217 K/UL
PMV BLD AUTO: 8.9 FL
POTASSIUM SERPL-SCNC: 4.7 MMOL/L
PROT SERPL-MCNC: 7.6 G/DL
RBC # BLD AUTO: 3.82 M/UL
SODIUM SERPL-SCNC: 132 MMOL/L
WBC # BLD AUTO: 5.61 K/UL

## 2017-09-25 PROCEDURE — 80053 COMPREHEN METABOLIC PANEL: CPT

## 2017-09-25 PROCEDURE — 85025 COMPLETE CBC W/AUTO DIFF WBC: CPT

## 2017-09-25 PROCEDURE — 36415 COLL VENOUS BLD VENIPUNCTURE: CPT

## 2017-09-25 NOTE — TELEPHONE ENCOUNTER
----- Message from Juan Foss MD sent at 9/25/2017 12:39 PM CDT -----  Please call and tell her that labs are fine to start niraparib.

## 2017-09-25 NOTE — TELEPHONE ENCOUNTER
Spoke with pt. Pt informed per Dr. Foss, labs are good for pt to start taking niraparib. She says thank you

## 2017-09-26 ENCOUNTER — TELEPHONE (OUTPATIENT)
Dept: GYNECOLOGIC ONCOLOGY | Facility: CLINIC | Age: 67
End: 2017-09-26

## 2017-09-26 NOTE — TELEPHONE ENCOUNTER
Took first dose of niraparib (3-100 mg capsules) today and within 15 minutes had burning of throat, followed by dizziness, off balance and jitteriness followed by SOB.       SOB is better. Other symptoms have resolved except for jitteriness.       Will not take any niraparib on Wednesday, then will try 100 mg capsule on Thursday and she will call.    At the end of phone conversation she was less anxious and feeling better.

## 2017-09-28 ENCOUNTER — TELEPHONE (OUTPATIENT)
Dept: GYNECOLOGIC ONCOLOGY | Facility: CLINIC | Age: 67
End: 2017-09-28

## 2017-09-28 NOTE — TELEPHONE ENCOUNTER
Patient called earlier.     Patient took only 1 Zejula today and had SOB, chest tightness, vomiting and crying.     She was advised to go to the ED but did not.     I called her after I got out of surgery.   I spoke with her .   She was resting.   Her symptoms had resolved.     I advised that she stop the Zejula.   Will no longer need weekly labs. Has appt with me in Oct 2017.

## 2017-09-28 NOTE — TELEPHONE ENCOUNTER
"Pt  called pt c/o shortness of breathe, chest tightening, vomiting, and crying a lot after taking zejula today at 12:30. Pt states symptoms started 30 to 40 minutes after taking medication. Pt states symptoms are better now. Pt advised to go to the ED, as we are talking are on the phone pt is trying to catch her breathe. Per the pt "she probably will not go to the ED", pt advised to keep an close eye on symptoms, if they return she is to go to the ED. Pt advised Dr. Foss is in surgery, message will be forward to physician. Pt voiced understanding   "

## 2017-10-24 ENCOUNTER — TELEPHONE (OUTPATIENT)
Dept: GYNECOLOGIC ONCOLOGY | Facility: CLINIC | Age: 67
End: 2017-10-24

## 2017-10-24 ENCOUNTER — INFUSION (OUTPATIENT)
Dept: INFUSION THERAPY | Facility: HOSPITAL | Age: 67
End: 2017-10-24
Attending: OBSTETRICS & GYNECOLOGY
Payer: MEDICARE

## 2017-10-24 ENCOUNTER — OFFICE VISIT (OUTPATIENT)
Dept: GYNECOLOGIC ONCOLOGY | Facility: CLINIC | Age: 67
End: 2017-10-24
Payer: MEDICARE

## 2017-10-24 VITALS
DIASTOLIC BLOOD PRESSURE: 81 MMHG | BODY MASS INDEX: 22.17 KG/M2 | HEIGHT: 64 IN | HEART RATE: 72 BPM | WEIGHT: 129.88 LBS | SYSTOLIC BLOOD PRESSURE: 174 MMHG

## 2017-10-24 DIAGNOSIS — C48.2 PRIMARY PERITONEAL ADENOCARCINOMA: Primary | ICD-10-CM

## 2017-10-24 PROCEDURE — 63600175 PHARM REV CODE 636 W HCPCS: Performed by: OBSTETRICS & GYNECOLOGY

## 2017-10-24 PROCEDURE — 99213 OFFICE O/P EST LOW 20 MIN: CPT | Mod: PBBFAC,25 | Performed by: OBSTETRICS & GYNECOLOGY

## 2017-10-24 PROCEDURE — 96523 IRRIG DRUG DELIVERY DEVICE: CPT

## 2017-10-24 PROCEDURE — 99214 OFFICE O/P EST MOD 30 MIN: CPT | Mod: S$PBB,,, | Performed by: OBSTETRICS & GYNECOLOGY

## 2017-10-24 PROCEDURE — A4216 STERILE WATER/SALINE, 10 ML: HCPCS | Performed by: OBSTETRICS & GYNECOLOGY

## 2017-10-24 PROCEDURE — 99999 PR PBB SHADOW E&M-EST. PATIENT-LVL III: CPT | Mod: PBBFAC,,, | Performed by: OBSTETRICS & GYNECOLOGY

## 2017-10-24 PROCEDURE — 25000003 PHARM REV CODE 250: Performed by: OBSTETRICS & GYNECOLOGY

## 2017-10-24 RX ORDER — HEPARIN 100 UNIT/ML
500 SYRINGE INTRAVENOUS
Status: COMPLETED | OUTPATIENT
Start: 2017-10-24 | End: 2017-10-24

## 2017-10-24 RX ORDER — SODIUM CHLORIDE 0.9 % (FLUSH) 0.9 %
10 SYRINGE (ML) INJECTION
Status: CANCELLED | OUTPATIENT
Start: 2017-10-24

## 2017-10-24 RX ORDER — HEPARIN 100 UNIT/ML
500 SYRINGE INTRAVENOUS
Status: CANCELLED | OUTPATIENT
Start: 2017-10-24

## 2017-10-24 RX ORDER — SODIUM CHLORIDE 0.9 % (FLUSH) 0.9 %
10 SYRINGE (ML) INJECTION
Status: COMPLETED | OUTPATIENT
Start: 2017-10-24 | End: 2017-10-24

## 2017-10-24 RX ADMIN — SODIUM CHLORIDE, PRESERVATIVE FREE 10 ML: 5 INJECTION INTRAVENOUS at 08:10

## 2017-10-24 RX ADMIN — HEPARIN 500 UNITS: 100 SYRINGE at 08:10

## 2017-10-24 NOTE — NURSING
Patient here for port flush-right chest port accesses easily with good blood return-line flushed-needle removed-patient tolerated well.

## 2017-10-24 NOTE — PROGRESS NOTES
Subjective:       Patient ID: Murlene Mary Haase is a 67 y.o. female.    Chief Complaint: Primary peritoneal adenocarcinoma (3 mth )    HPI     Patient comes in today for follow up for recurrent ovarian cancer. She has a BRCA 2 mutation.   She tried Niraparib but had a reaction to it on two occassions and stopped this. Started with 3 tablets day one and had SOB and chest pain. Waited several days and restarted with 1 tablet. Had the same symptoms and did not want to resume.      is 10.               Mammogram : Feb 24, 2017: normal. [facility]. Had MRI of both breast in April 2017 for bilateral breast pain. MRI per patient was negative.  Colonoscopy: Aug 11, 2016: inflammation and diverticulosis  EGD: Aug 11, 2016: normal       Her oncologic history is:    She was taken to the operating room on July 25, 2013 for preoperative diagnosis consistent with ovarian cancer. 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 At that time she underwent an omentectomy and bilateral salpingo-oophorectomy however she had bulky residual disease along the diaphragms as well as paracolic gutters and pelvis.    She was then treated with 3 cycles of dose dense paclitaxel and carboplatin. A CT scan obtained after this showed the following:    Subcentimeter hepatic lesions, too small to accurately characterize.    Subtle, increased soft tissue attenuation along the periphery of the right hepatic lobe that is nonspecific but is favored to represent a prominent right hemidiaphragm. Peritoneal thickening is thought to be less likely but cannot be entirely excluded   and correlation with prior imaging studies is advised if available.     Large amount of stool throughout entire colon.    L1 vertebral body compression fracture of unknown chronicity.   Small pericardial effusion.    She then received 2 more cycles of dose dense paclitaxel and carboplatin and then received one cycle of standard Taxol  "and carboplatin. Her last chemotherapy was December 26, 2013.    At the completion of 6 cycles of postoperative Taxol and carboplatin, her  was 7 and her CT scan of the abdomen and pelvis showed no evidence for disease.        In Feb 2016,  was 24.May 2016  was 35. June 2016: 33.   May 2016: CT scan abdomen and pelvis was negative.   August 2016:  was 31.  Nov 2016: C A 125 36  Feb 2017:  56. She has had a mildly elevated  in the 30s with negative scanning.    in Feb 2017 was 56. CT scan showed possible intussusception and Pelvis:  2.0 x 0.9 cm soft tissue attenuating focus in the anterior mid pelvis that abuts bowel, and is present on arterial/delayed phases.      PET was done and shows adenopathy as well.   Completed 6 cycles of taxol and carboplatin on 6/29/2017 for recurrent ovarian cancer.  Received chemotherapy with Dr. Tana Deutsch.  CT scan after completion of 6 cycles of taxol and carboplatin shows stable right pelvic lymph node index lesion measuring 0.7 cm with some prominent lymph nodes in the jessenia hepatis, unchanged from prior.  There is no new lymphadenopathy identified.   was 8.       Patient with a BRCA 2 mutation.          Review of Systems   Constitutional: Negative for chills, fatigue and fever.   Respiratory: Negative for cough, shortness of breath and wheezing.    Cardiovascular: Negative for chest pain, palpitations and leg swelling.   Gastrointestinal: Negative for abdominal pain, constipation, diarrhea, nausea and vomiting.   Genitourinary: Negative for difficulty urinating, dysuria, frequency, genital sores, hematuria, urgency, vaginal bleeding, vaginal discharge and vaginal pain.   Neurological: Negative for weakness.   Hematological: Negative for adenopathy. Does not bruise/bleed easily.   Psychiatric/Behavioral: The patient is not nervous/anxious.        Objective:     BP (!) 174/81   Pulse 72   Ht 5' 4" (1.626 m)   Wt 58.9 kg (129 lb " 13.6 oz)   BMI 22.29 kg/m²     Physical Exam   Constitutional: She is oriented to person, place, and time. She appears well-developed and well-nourished.   HENT:   Head: Normocephalic and atraumatic.   Eyes: No scleral icterus.   Neck: Neck supple. No tracheal deviation present. No thyroid mass and no thyromegaly present.   Cardiovascular: Normal rate and regular rhythm.    Pulmonary/Chest: Effort normal and breath sounds normal. She has no wheezes.   Abdominal: Soft. She exhibits no distension and no mass. There is no hepatosplenomegaly. There is no tenderness. There is no rebound and no guarding.   Genitourinary:   Genitourinary Comments: Bimanual exam:  Vulva: no lesions. Normal appearance  Urethra: Normal size and location. No lesions  Bladder: No masses or tenderness.  Vagina: normal mucosa. No lesion  Cervix: absent.   Uterus: absent.  Adnexa: no masses.  Rectovaginal: Not performed     Musculoskeletal: She exhibits no edema or tenderness.   Lymphadenopathy:     She has no cervical adenopathy.     She has no axillary adenopathy.        Right: No inguinal and no supraclavicular adenopathy present.        Left: No inguinal and no supraclavicular adenopathy present.   Neurological: She is alert and oriented to person, place, and time.   Skin: Skin is warm and dry.   Psychiatric: She has a normal mood and affect. Her behavior is normal. Judgment and thought content normal.       Assessment:       1. Primary peritoneal adenocarcinoma        Plan:   Primary peritoneal adenocarcinoma   DAMON    RTC in 3 months.   -     ; Standing

## 2017-11-15 ENCOUNTER — TELEPHONE (OUTPATIENT)
Dept: GYNECOLOGIC ONCOLOGY | Facility: CLINIC | Age: 67
End: 2017-11-15

## 2017-11-15 NOTE — TELEPHONE ENCOUNTER
----- Message from Jenny Salazar sent at 11/15/2017  1:16 PM CST -----  [facility]                            [facility]# [id number]     [facility]called/# [phone] & ask to speak with a nurse     They need to know if Ms Haase is still taking Zejula

## 2018-01-05 ENCOUNTER — TELEPHONE (OUTPATIENT)
Dept: GYNECOLOGIC ONCOLOGY | Facility: CLINIC | Age: 68
End: 2018-01-05

## 2018-01-05 NOTE — TELEPHONE ENCOUNTER
----- Message from Klaudia Margaretjuan sent at 1/5/2018  8:48 AM CST -----  Contact: HAASE,MURLENE MARY [[id number]]   x_  1st Request  _  2nd Request  _  3rd Request        Who: HAASE,MURLENE MARY [[id number]]    Why: Requesting a call back in regards to getting an appointment to get her port flushed, please call her   What Number to Call Back: [phone]    When to Expect a call back: (Within 24 hours)    Please return the call at earliest convenience. Thanks!

## 2018-01-05 NOTE — TELEPHONE ENCOUNTER
Spoke with pt. Pt port flush she been scheduled for 9 am on Tuesday 1/23. appt letter to be mailed to pt. She says thank you

## 2018-01-15 ENCOUNTER — TELEPHONE (OUTPATIENT)
Dept: GYNECOLOGIC ONCOLOGY | Facility: CLINIC | Age: 68
End: 2018-01-15

## 2018-01-23 ENCOUNTER — OFFICE VISIT (OUTPATIENT)
Dept: GYNECOLOGIC ONCOLOGY | Facility: CLINIC | Age: 68
End: 2018-01-23
Payer: MEDICARE

## 2018-01-23 ENCOUNTER — LAB VISIT (OUTPATIENT)
Dept: LAB | Facility: HOSPITAL | Age: 68
End: 2018-01-23
Attending: OBSTETRICS & GYNECOLOGY
Payer: MEDICARE

## 2018-01-23 ENCOUNTER — INFUSION (OUTPATIENT)
Dept: INFUSION THERAPY | Facility: HOSPITAL | Age: 68
End: 2018-01-23
Attending: OBSTETRICS & GYNECOLOGY
Payer: MEDICARE

## 2018-01-23 VITALS
WEIGHT: 132.25 LBS | BODY MASS INDEX: 22.71 KG/M2 | DIASTOLIC BLOOD PRESSURE: 71 MMHG | SYSTOLIC BLOOD PRESSURE: 149 MMHG

## 2018-01-23 DIAGNOSIS — C48.2 PRIMARY PERITONEAL ADENOCARCINOMA: Primary | ICD-10-CM

## 2018-01-23 DIAGNOSIS — C48.2 PRIMARY PERITONEAL ADENOCARCINOMA: ICD-10-CM

## 2018-01-23 DIAGNOSIS — Z01.419 WELL WOMAN EXAM WITH ROUTINE GYNECOLOGICAL EXAM: ICD-10-CM

## 2018-01-23 DIAGNOSIS — Z12.89 ENCOUNTER FOR PELVIC SCREENING FOR CANCER: ICD-10-CM

## 2018-01-23 LAB — CANCER AG125 SERPL-ACNC: 7 U/ML

## 2018-01-23 PROCEDURE — G0101 CA SCREEN;PELVIC/BREAST EXAM: HCPCS | Mod: S$PBB,,, | Performed by: OBSTETRICS & GYNECOLOGY

## 2018-01-23 PROCEDURE — 63600175 PHARM REV CODE 636 W HCPCS: Performed by: OBSTETRICS & GYNECOLOGY

## 2018-01-23 PROCEDURE — 25000003 PHARM REV CODE 250: Performed by: OBSTETRICS & GYNECOLOGY

## 2018-01-23 PROCEDURE — 99212 OFFICE O/P EST SF 10 MIN: CPT | Mod: PBBFAC,25 | Performed by: OBSTETRICS & GYNECOLOGY

## 2018-01-23 PROCEDURE — 99999 PR PBB SHADOW E&M-EST. PATIENT-LVL II: CPT | Mod: PBBFAC,,, | Performed by: OBSTETRICS & GYNECOLOGY

## 2018-01-23 PROCEDURE — 36591 DRAW BLOOD OFF VENOUS DEVICE: CPT

## 2018-01-23 PROCEDURE — A4216 STERILE WATER/SALINE, 10 ML: HCPCS | Performed by: OBSTETRICS & GYNECOLOGY

## 2018-01-23 PROCEDURE — 86304 IMMUNOASSAY TUMOR CA 125: CPT

## 2018-01-23 PROCEDURE — G0101 CA SCREEN;PELVIC/BREAST EXAM: HCPCS | Mod: PBBFAC | Performed by: OBSTETRICS & GYNECOLOGY

## 2018-01-23 RX ORDER — SODIUM CHLORIDE 0.9 % (FLUSH) 0.9 %
10 SYRINGE (ML) INJECTION
Status: COMPLETED | OUTPATIENT
Start: 2018-01-23 | End: 2018-01-23

## 2018-01-23 RX ORDER — SODIUM CHLORIDE 0.9 % (FLUSH) 0.9 %
10 SYRINGE (ML) INJECTION
Status: CANCELLED | OUTPATIENT
Start: 2018-01-23

## 2018-01-23 RX ORDER — HEPARIN 100 UNIT/ML
500 SYRINGE INTRAVENOUS
Status: COMPLETED | OUTPATIENT
Start: 2018-01-23 | End: 2018-01-23

## 2018-01-23 RX ORDER — HEPARIN 100 UNIT/ML
500 SYRINGE INTRAVENOUS
Status: CANCELLED | OUTPATIENT
Start: 2018-01-23

## 2018-01-23 RX ADMIN — SODIUM CHLORIDE, PRESERVATIVE FREE 10 ML: 5 INJECTION INTRAVENOUS at 09:01

## 2018-01-23 RX ADMIN — HEPARIN 500 UNITS: 100 SYRINGE at 09:01

## 2018-01-23 NOTE — PROGRESS NOTES
Subjective:       Patient ID: Murlene Mary Haase is a 67 y.o. female.    Chief Complaint: Follow-up (WWE and CBE)    HPI     Patient comes in today for her WWE and  follow up for recurrent ovarian cancer. She has a BRCA 2 mutation. She could not tolerate niraparib.    is 7.      Mammogram : Feb 24, 2017: normal. [facility]. Had MRI of both breast in April 2017 for bilateral breast pain. MRI per patient was negative. 12/2017 diagnostic MMG L breast negative (done for breast pain)  Colonoscopy: Aug 11, 2016: inflammation and diverticulosis  EGD: Aug 11, 2016: normal       Her oncologic history is:    She was taken to the operating room on July 25, 2013 for preoperative diagnosis consistent with ovarian cancer. 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 At that time she underwent an omentectomy and bilateral salpingo-oophorectomy however she had bulky residual disease along the diaphragms as well as paracolic gutters and pelvis.    She was then treated with 3 cycles of dose dense paclitaxel and carboplatin. A CT scan obtained after this showed the following:    Subcentimeter hepatic lesions, too small to accurately characterize.    Subtle, increased soft tissue attenuation along the periphery of the right hepatic lobe that is nonspecific but is favored to represent a prominent right hemidiaphragm. Peritoneal thickening is thought to be less likely but cannot be entirely excluded   and correlation with prior imaging studies is advised if available.     Large amount of stool throughout entire colon.    L1 vertebral body compression fracture of unknown chronicity.   Small pericardial effusion.    She then received 2 more cycles of dose dense paclitaxel and carboplatin and then received one cycle of standard Taxol and carboplatin. Her last chemotherapy was December 26, 2013.    At the completion of 6 cycles of postoperative Taxol and carboplatin, her  was 7 and her  CT scan of the abdomen and pelvis showed no evidence for disease.        In Feb 2016,  was 24.May 2016  was 35. June 2016: 33.   May 2016: CT scan abdomen and pelvis was negative.   August 2016:  was 31.  Nov 2016: C A 125 36  Feb 2017:  56. She has had a mildly elevated  in the 30s with negative scanning.    in Feb 2017 was 56. CT scan showed possible intussusception and Pelvis:  2.0 x 0.9 cm soft tissue attenuating focus in the anterior mid pelvis that abuts bowel, and is present on arterial/delayed phases.      PET was done and shows adenopathy as well.   Completed 6 cycles of taxol and carboplatin on 6/29/2017 for recurrent ovarian cancer.  Received chemotherapy with Dr. Tana Deutsch.  CT scan after completion of 6 cycles of taxol and carboplatin shows stable right pelvic lymph node index lesion measuring 0.7 cm with some prominent lymph nodes in the jessenia hepatis, unchanged from prior.  There is no new lymphadenopathy identified.   was 8.       Patient with a BRCA 2 mutation.      Oct 2017:  She tried Niraparib but had a reaction to it on two occasions and stopped this. Started with 3 tablets day one and had SOB and chest pain. Waited several days and restarted with 1 tablet. Had the same symptoms and did not want to resume.           Review of Systems   Constitutional: Negative for activity change, appetite change, chills, diaphoresis, fatigue, fever and unexpected weight change.   Respiratory: Negative for chest tightness, shortness of breath and wheezing.    Cardiovascular: Negative for chest pain, palpitations and leg swelling.   Gastrointestinal: Positive for abdominal pain (LUQ yesterday after increased activity) and constipation (occasional w IBS). Negative for blood in stool, diarrhea, nausea and vomiting.   Genitourinary: Negative for difficulty urinating, dysuria, flank pain, frequency, genital sores, hematuria, pelvic pain, urgency, vaginal bleeding, vaginal  discharge and vaginal pain.   Skin: Negative for color change.   Neurological: Negative for dizziness, weakness, light-headedness and headaches.   Hematological: Negative for adenopathy.   Psychiatric/Behavioral: Negative for agitation.       Objective:   BP (!) 149/71   Wt 60 kg (132 lb 4.4 oz)   BMI 22.71 kg/m²      Physical Exam   Constitutional: She is oriented to person, place, and time. She appears well-developed and well-nourished. No distress.   Neck: Normal range of motion.   Cardiovascular: Normal rate, regular rhythm and intact distal pulses.    No murmur heard.  Pulmonary/Chest: Effort normal and breath sounds normal. No respiratory distress. She has no wheezes. She has no rales. Right breast exhibits no inverted nipple, no mass, no nipple discharge, no skin change and no tenderness. Left breast exhibits no inverted nipple, no mass, no nipple discharge, no skin change and no tenderness. Breasts are symmetrical.   Abdominal: Soft. She exhibits no distension and no mass. There is no tenderness. There is no rebound and no guarding.   Genitourinary: Vagina normal. Pelvic exam was performed with patient supine. There is no rash, tenderness or lesion on the right labia. There is no rash, tenderness or lesion on the left labia. No vaginal discharge found.   Genitourinary Comments: Bimanual exam:  Vulva: no lesions. Normal appearance  Urethra: Normal size and location. No lesions  Bladder: No masses or tenderness.  Vagina: normal mucosa. No lesion  Cervix: absent.   Uterus: absent.  Adnexa: no masses.  Rectovaginal: No posterior cul de sac thickening or nodularity.  Rectal: no masses. Nontender. Normal tone.    Musculoskeletal: She exhibits no edema or tenderness.   Lymphadenopathy:     She has no cervical adenopathy.        Right: No inguinal adenopathy present.        Left: No inguinal adenopathy present.   Neurological: She is alert and oriented to person, place, and time.   Skin: Skin is warm and dry. No  rash noted. She is not diaphoretic. No erythema. No pallor.   Psychiatric: She has a normal mood and affect. Her behavior is normal.       Assessment:       1. Primary peritoneal adenocarcinoma    2. Well woman exam with routine gynecological exam        Plan:   Primary peritoneal adenocarcinoma   DAMON    RTC in 3 months.   -     ; Future; Expected date: 04/23/2018    Well woman exam with routine gynecological exam  Counseling time of 10 minutes discussing calcium, vitamin D and exercise. Questions answered.   DAMON on today's exam.  MMG in Arvind 2/2018  RTC in 3 months or sooner for problems.

## 2018-03-21 ENCOUNTER — DOCUMENTATION ONLY (OUTPATIENT)
Dept: GYNECOLOGIC ONCOLOGY | Facility: CLINIC | Age: 68
End: 2018-03-21

## 2018-04-24 ENCOUNTER — INFUSION (OUTPATIENT)
Dept: INFUSION THERAPY | Facility: HOSPITAL | Age: 68
End: 2018-04-24
Attending: OBSTETRICS & GYNECOLOGY
Payer: MEDICARE

## 2018-04-24 ENCOUNTER — TELEPHONE (OUTPATIENT)
Dept: SURGERY | Facility: CLINIC | Age: 68
End: 2018-04-24

## 2018-04-24 ENCOUNTER — OFFICE VISIT (OUTPATIENT)
Dept: GYNECOLOGIC ONCOLOGY | Facility: CLINIC | Age: 68
End: 2018-04-24
Payer: MEDICARE

## 2018-04-24 VITALS
SYSTOLIC BLOOD PRESSURE: 125 MMHG | DIASTOLIC BLOOD PRESSURE: 59 MMHG | HEART RATE: 72 BPM | HEIGHT: 64 IN | WEIGHT: 136.69 LBS | BODY MASS INDEX: 23.34 KG/M2

## 2018-04-24 DIAGNOSIS — Z15.02 BRCA2 GENE MUTATION POSITIVE IN FEMALE: ICD-10-CM

## 2018-04-24 DIAGNOSIS — C48.2 PRIMARY PERITONEAL ADENOCARCINOMA: Primary | ICD-10-CM

## 2018-04-24 DIAGNOSIS — Z15.09 BRCA2 GENE MUTATION POSITIVE IN FEMALE: ICD-10-CM

## 2018-04-24 DIAGNOSIS — Z15.01 BRCA2 GENE MUTATION POSITIVE IN FEMALE: ICD-10-CM

## 2018-04-24 LAB — CANCER AG125 SERPL-ACNC: 9 U/ML

## 2018-04-24 PROCEDURE — 86304 IMMUNOASSAY TUMOR CA 125: CPT

## 2018-04-24 PROCEDURE — 63600175 PHARM REV CODE 636 W HCPCS: Performed by: OBSTETRICS & GYNECOLOGY

## 2018-04-24 PROCEDURE — 99214 OFFICE O/P EST MOD 30 MIN: CPT | Mod: S$PBB,,, | Performed by: OBSTETRICS & GYNECOLOGY

## 2018-04-24 PROCEDURE — 36591 DRAW BLOOD OFF VENOUS DEVICE: CPT

## 2018-04-24 PROCEDURE — A4216 STERILE WATER/SALINE, 10 ML: HCPCS | Performed by: OBSTETRICS & GYNECOLOGY

## 2018-04-24 PROCEDURE — 99213 OFFICE O/P EST LOW 20 MIN: CPT | Mod: PBBFAC,25 | Performed by: OBSTETRICS & GYNECOLOGY

## 2018-04-24 PROCEDURE — 25000003 PHARM REV CODE 250: Performed by: OBSTETRICS & GYNECOLOGY

## 2018-04-24 PROCEDURE — 99999 PR PBB SHADOW E&M-EST. PATIENT-LVL III: CPT | Mod: PBBFAC,,, | Performed by: OBSTETRICS & GYNECOLOGY

## 2018-04-24 RX ORDER — SODIUM CHLORIDE 0.9 % (FLUSH) 0.9 %
10 SYRINGE (ML) INJECTION
Status: COMPLETED | OUTPATIENT
Start: 2018-04-24 | End: 2018-04-24

## 2018-04-24 RX ORDER — HEPARIN 100 UNIT/ML
500 SYRINGE INTRAVENOUS
Status: CANCELLED | OUTPATIENT
Start: 2018-04-24

## 2018-04-24 RX ORDER — SODIUM CHLORIDE 0.9 % (FLUSH) 0.9 %
10 SYRINGE (ML) INJECTION
Status: CANCELLED | OUTPATIENT
Start: 2018-04-24

## 2018-04-24 RX ORDER — HEPARIN 100 UNIT/ML
500 SYRINGE INTRAVENOUS
Status: COMPLETED | OUTPATIENT
Start: 2018-04-24 | End: 2018-04-24

## 2018-04-24 RX ADMIN — HEPARIN 500 UNITS: 100 SYRINGE at 09:04

## 2018-04-24 RX ADMIN — SODIUM CHLORIDE, PRESERVATIVE FREE 10 ML: 5 INJECTION INTRAVENOUS at 09:04

## 2018-04-24 NOTE — PROGRESS NOTES
Subjective:       Patient ID: Murlene Mary Haase is a 68 y.o. female.    Chief Complaint: Primary peritoneal adenocarcinoma    HPI   Patient comes in today for follow up for recurrent ovarian cancer. She has a BRCA 2 mutation. She could not tolerate niraparib.    is 9.      MMG: 3/20/2018: normal. See [facility]. Had MRI of both breast in April 2017 for bilateral breast pain. MRI per patient was negative. 12/2017 diagnostic MMG L breast negative (done for breast pain)  Colonoscopy: Aug 11, 2016: inflammation and diverticulosis  EGD: Aug 11, 2016: normal   CBE: Jan 2018      Her oncologic history is:    She was taken to the operating room on July 25, 2013 for preoperative diagnosis consistent with ovarian cancer. 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 At that time she underwent an omentectomy and bilateral salpingo-oophorectomy however she had bulky residual disease along the diaphragms as well as paracolic gutters and pelvis.    She was then treated with 3 cycles of dose dense paclitaxel and carboplatin. A CT scan obtained after this showed the following:    Subcentimeter hepatic lesions, too small to accurately characterize.    Subtle, increased soft tissue attenuation along the periphery of the right hepatic lobe that is nonspecific but is favored to represent a prominent right hemidiaphragm. Peritoneal thickening is thought to be less likely but cannot be entirely excluded   and correlation with prior imaging studies is advised if available.     Large amount of stool throughout entire colon.    L1 vertebral body compression fracture of unknown chronicity.   Small pericardial effusion.    She then received 2 more cycles of dose dense paclitaxel and carboplatin and then received one cycle of standard Taxol and carboplatin. Her last chemotherapy was December 26, 2013.    At the completion of 6 cycles of postoperative Taxol and carboplatin, her   was 7 and her CT scan of the abdomen and pelvis showed no evidence for disease.        In Feb 2016,  was 24.May 2016  was 35. June 2016: 33.   May 2016: CT scan abdomen and pelvis was negative.   August 2016:  was 31.  Nov 2016: C A 125 36  Feb 2017:  56. She has had a mildly elevated  in the 30s with negative scanning.    in Feb 2017 was 56. CT scan showed possible intussusception and Pelvis:  2.0 x 0.9 cm soft tissue attenuating focus in the anterior mid pelvis that abuts bowel, and is present on arterial/delayed phases.      PET was done and shows adenopathy as well.   Completed 6 cycles of taxol and carboplatin on 6/29/2017 for recurrent ovarian cancer.  Received chemotherapy with Dr. Tana Deutsch.  CT scan after completion of 6 cycles of taxol and carboplatin shows stable right pelvic lymph node index lesion measuring 0.7 cm with some prominent lymph nodes in the jessenia hepatis, unchanged from prior.  There is no new lymphadenopathy identified.   was 8.       Oct 2017:  She tried Niraparib but had a reaction to it on two occasions and stopped this. Started with 3 tablets day one and had SOB and chest pain. Waited several days and restarted with 1 tablet. Had the same symptoms and did not want to resume.      Patient with a BRCA 2 mutation.           Review of Systems   Constitutional: Negative for chills, fatigue and fever.   Respiratory: Negative for cough, shortness of breath and wheezing.    Cardiovascular: Negative for chest pain, palpitations and leg swelling.   Gastrointestinal: Negative for abdominal pain, constipation, diarrhea, nausea and vomiting.   Genitourinary: Negative for difficulty urinating, dysuria, frequency, genital sores, hematuria, urgency, vaginal bleeding, vaginal discharge and vaginal pain.   Neurological: Negative for weakness.   Hematological: Negative for adenopathy. Does not bruise/bleed easily.   Psychiatric/Behavioral: The patient is not  "nervous/anxious.        Objective:   BP (!) 125/59   Pulse 72   Ht 5' 4" (1.626 m)   Wt 62 kg (136 lb 11 oz)   BMI 23.46 kg/m²      Physical Exam   Constitutional: She is oriented to person, place, and time. She appears well-developed and well-nourished.   HENT:   Head: Normocephalic and atraumatic.   Eyes: No scleral icterus.   Neck: Neck supple. No tracheal deviation present. No thyroid mass and no thyromegaly present.   Cardiovascular: Normal rate and regular rhythm.    Pulmonary/Chest: Effort normal and breath sounds normal. She has no wheezes.   Abdominal: Soft. She exhibits no distension and no mass. There is no hepatosplenomegaly. There is no tenderness. There is no rebound and no guarding.   Genitourinary:   Genitourinary Comments: Bimanual exam:  Vulva: no lesions. Normal appearance  Urethra: Normal size and location. No lesions  Bladder: No masses or tenderness.  Vagina: normal mucosa. No lesion  Cervix: absent.   Uterus: absent.  Adnexa: no masses.  Rectovaginal: Not performed     Musculoskeletal: She exhibits no edema or tenderness.   Lymphadenopathy:     She has no cervical adenopathy.     She has no axillary adenopathy.        Right: No inguinal and no supraclavicular adenopathy present.        Left: No inguinal and no supraclavicular adenopathy present.   Neurological: She is alert and oriented to person, place, and time.   Skin: Skin is warm and dry.   Psychiatric: She has a normal mood and affect. Her behavior is normal. Judgment and thought content normal.       Assessment:       1. Primary peritoneal adenocarcinoma    2. BRCA2 gene mutation positive in female        Plan:   Primary peritoneal adenocarcinoma   DAMON    RTC in 3 months.   -     ; Future; Expected date: 07/24/2018    BRCA2 gene mutation positive in female  -     Ambulatory referral to Surgical Oncology        "

## 2018-04-24 NOTE — TELEPHONE ENCOUNTER
Spoke with Dr Foss's office, pt being referred for BRCA positive, pt signed release of information to obtain breast images, all breast imaging reports and BRCA genetic results faxed to our office, pt scheduled and confirmed for 5/7/18 at 0830

## 2018-05-07 ENCOUNTER — OFFICE VISIT (OUTPATIENT)
Dept: SURGERY | Facility: CLINIC | Age: 68
End: 2018-05-07
Payer: MEDICARE

## 2018-05-07 VITALS
WEIGHT: 138.63 LBS | HEART RATE: 62 BPM | SYSTOLIC BLOOD PRESSURE: 111 MMHG | HEIGHT: 64 IN | TEMPERATURE: 98 F | BODY MASS INDEX: 23.67 KG/M2 | DIASTOLIC BLOOD PRESSURE: 59 MMHG

## 2018-05-07 DIAGNOSIS — Z91.89 AT HIGH RISK FOR BREAST CANCER: ICD-10-CM

## 2018-05-07 DIAGNOSIS — Z15.09 BRCA2 POSITIVE: Primary | ICD-10-CM

## 2018-05-07 DIAGNOSIS — Z15.01 BRCA2 POSITIVE: Primary | ICD-10-CM

## 2018-05-07 PROCEDURE — 99999 PR PBB SHADOW E&M-EST. PATIENT-LVL III: CPT | Mod: PBBFAC,,, | Performed by: SURGERY

## 2018-05-07 PROCEDURE — 99213 OFFICE O/P EST LOW 20 MIN: CPT | Mod: PBBFAC,PO | Performed by: SURGERY

## 2018-05-07 PROCEDURE — 99205 OFFICE O/P NEW HI 60 MIN: CPT | Mod: S$PBB,,, | Performed by: SURGERY

## 2018-05-07 NOTE — LETTER
May 13, 2018      Juan Foss MD  [address]  [facility] [address]           [facility]  [address]  [facility] [address]  Phone: [phone]  Fax: [phone]          Patient: Murlene Mary Haase   MR Number: [id number]   YOB: 1950   Date of Visit: 5/7/2018       Dear Dr. Juan Foss:    Thank you for referring Murlene Haase to me for evaluation. Attached you will find relevant portions of my assessment and plan of care.    If you have questions, please do not hesitate to call me. I look forward to following Murlene Haase along with you.    Sincerely,    Ariadna Hoover MD    Enclosure  CC:  No Recipients    If you would like to receive this communication electronically, please contact [email or web address] or [phone] to request more information on [employer] Link access.    For providers and/or their staff who would like to refer a patient to [facility], please contact us through our one-stop-shop provider referral line, [facility], at [phone].    If you feel you have received this communication in error or would no longer like to receive these types of communications, please e-mail [email or web address]

## 2018-05-07 NOTE — PROGRESS NOTES
BREAST HIGH RISK CLINIC  [facility]  Breast Surgery      Date of Visit:  05/07/2018    Referring provider:  Juan Foss MD  [address] [place] [address]    PCP:  Nadine Peralta MD    HIGH RISK    Murlene Mary Haase is a 68 y.o. postmenopausal female referred for evaluation of increased risk of breast cancer based on BRCA2 mutation with recurrent ovarian cancer.  Here today to discussed options of high risk screening and risk reduction.      Oncologic history:  July 2013: She was taken to the operating room on July 25, 2013 for preoperative diagnosis consistent with ovarian cancer. 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 At that time she underwent an omentectomy and bilateral salpingo-oophorectomy however she had bulky residual disease along the diaphragms as well as paracolic gutters and pelvis. Treated with 3 cycles of dose dense paclitaxel and carboplatin. A CT scan obtained after this showed the following:    Subcentimeter hepatic lesions, too small to accurately characterize. Subtle, increased soft tissue attenuation along the periphery of the right hepatic lobe that is nonspecific but is favored to represent a prominent right hemidiaphragm.     She then received 2 more cycles of dose dense paclitaxel and carboplatin and then received one cycle of standard Taxol and carboplatin. Her last chemotherapy was December 26, 2013.    At the completion of 6 cycles of postoperative Taxol and carboplatin, her  was 7 and her CT scan of the abdomen and pelvis showed no evidence for disease.        Feb 2016:  was 24.May 2016  was 35. June 2016: 33.   May 2016: CT scan abdomen and pelvis was negative.   August 2016:  was 31.  Nov 2016: C A 125 36  Feb 2017:  56. CT scan showed possible intussusception and Pelvis:  2.0 x 0.9 cm soft tissue attenuating focus in the anterior mid pelvis that abuts bowel, and is present on arterial/delayed  phases. PET was done and shows adenopathy as well.   Completed 6 cycles of taxol and carboplatin on 6/29/2017 for recurrent ovarian cancer.  Received chemotherapy with Dr. Tana Deutsch. CT scan after completion of 6 cycles of taxol and carboplatin shows stable right pelvic lymph node index lesion measuring 0.7 cm with some prominent lymph nodes in the jessenia hepatis, unchanged from prior.  There is no new lymphadenopathy identified.  was 8.    Oct 2017: Tried Niraparib but had a reaction to it on two occasions and stopped it. Started with 3 tablets day one and had SOB and chest pain. Waited several days and restarted with 1 tablet. Had the same symptoms and did not want to resume.     The following portions of the patient's history were reviewed and updated as appropriate: She  has a past medical history of Arthritis; BRCA2 gene mutation positive in female (4/24/2018); Elevated cancer antigen 125 (CA-125) (5/18/2016); Fibromyalgia; Gastritis; Neuromuscular disorder; Osteopenia; Primary peritoneal carcinomatosis (7/26/2013); and Well woman exam with routine gynecological exam (2/10/2015).  She  does not have any pertinent problems on file.  She  has a past surgical history that includes Hysterectomy; Appendectomy; Tonsillectomy; Cholecystectomy; Breast biopsy; Cyst Removal; Salpingoophorectomy (7/25/13); and pr removal of ovary/tube(s).  Her family history includes Cancer in her mother and paternal aunt; Heart disease (age of onset: 69) in her father.  She  reports that she has never smoked. She has never used smokeless tobacco. She reports that she does not drink alcohol or use drugs.  She is allergic to iodinated contrast- oral and iv dye..    Review of Systems  Pertinent items are noted in HPI.      Objective:   There were no vitals taken for this visit.    General: NAD  Chest: nonlabored breathing, no obvious deformity  Heart: regular rate  Abdomen: soft, nondistended  Extremities: no edema noted  Breasts:  breasts appear normal, no suspicious masses, no skin or nipple changes or axillary nodes.      Imaging  Mammograms/MRI:  3/30/18, normal. See [facility]. Had MRI of both breast in April 2017 for bilateral breast pain. MRI per patient was negative. 12/2017 diagnostic MMG L breast negative (done for breast pain)     Assessment:     This is a 68 y.o. female with an increased risk of breast cancer based on BRCA2 mutation and personal history of ovarian cancer.        Plan:   Discussed risk models can vary based on the model used.  There are several models available and we currently use TC model for our patients with family history.  Based on this, the patient's risk exceeds 20% (25%).  Patients with lifetime risk over 20% qualify for increased screening with MRI, in addition to mammograms.  We also would perform breast exams every 6 months.  Discussed pros and cons of MRI screening.    We also discussed risk reduction options.    Discussed prophylactic mastectomy,  However given recent history of ovarian cancer recurrence, I would lean toward surveillence at this point.  Diagnosed with Stage 3 ovarian cancer in 2013 treated with surgery and chemo.  Recurrence in 2016, completed chemo June 2017. Attempted nirapinib but discontinued for side effects.    I will see the patient back in 6 months with a MRI.

## 2018-06-04 ENCOUNTER — HOSPITAL ENCOUNTER (OUTPATIENT)
Dept: RADIOLOGY | Facility: HOSPITAL | Age: 68
Discharge: HOME | End: 2018-06-04
Attending: SURGERY
Payer: MEDICARE

## 2018-06-04 DIAGNOSIS — Z91.89 AT HIGH RISK FOR BREAST CANCER: ICD-10-CM

## 2018-06-04 LAB
CREAT SERPL-MCNC: 0.8 MG/DL (ref 0.5–1.4)
SAMPLE: NORMAL

## 2018-06-04 PROCEDURE — 0159T MRI BREAST BILATERAL W W/O CONTRAST: CPT | Mod: 26,,, | Performed by: RADIOLOGY

## 2018-06-04 PROCEDURE — 0159T MRI BREAST BILATERAL W W/O CONTRAST: CPT | Mod: TC

## 2018-06-04 PROCEDURE — A9577 INJ MULTIHANCE: HCPCS | Performed by: SURGERY

## 2018-06-04 PROCEDURE — 77059 MRI BREAST BILATERAL W W/O CONTRAST: CPT | Mod: 26,,, | Performed by: RADIOLOGY

## 2018-06-04 PROCEDURE — 25500020 PHARM REV CODE 255: Performed by: SURGERY

## 2018-06-04 RX ADMIN — GADOBENATE DIMEGLUMINE 13 ML: 529 INJECTION, SOLUTION INTRAVENOUS at 10:06

## 2018-07-23 ENCOUNTER — TELEPHONE (OUTPATIENT)
Dept: GYNECOLOGIC ONCOLOGY | Facility: CLINIC | Age: 68
End: 2018-07-23

## 2018-07-24 ENCOUNTER — OFFICE VISIT (OUTPATIENT)
Dept: GYNECOLOGIC ONCOLOGY | Facility: CLINIC | Age: 68
End: 2018-07-24
Payer: MEDICARE

## 2018-07-24 ENCOUNTER — INFUSION (OUTPATIENT)
Dept: INFUSION THERAPY | Facility: HOSPITAL | Age: 68
End: 2018-07-24
Attending: OBSTETRICS & GYNECOLOGY
Payer: MEDICARE

## 2018-07-24 VITALS
HEART RATE: 61 BPM | SYSTOLIC BLOOD PRESSURE: 134 MMHG | HEIGHT: 64 IN | DIASTOLIC BLOOD PRESSURE: 68 MMHG | WEIGHT: 138.25 LBS | BODY MASS INDEX: 23.6 KG/M2

## 2018-07-24 DIAGNOSIS — C48.2 PRIMARY PERITONEAL ADENOCARCINOMA: Primary | ICD-10-CM

## 2018-07-24 DIAGNOSIS — Z15.09 BRCA2 GENE MUTATION POSITIVE IN FEMALE: ICD-10-CM

## 2018-07-24 DIAGNOSIS — Z15.01 BRCA2 GENE MUTATION POSITIVE IN FEMALE: ICD-10-CM

## 2018-07-24 DIAGNOSIS — Z15.02 BRCA2 GENE MUTATION POSITIVE IN FEMALE: ICD-10-CM

## 2018-07-24 LAB — CANCER AG125 SERPL-ACNC: 10 U/ML

## 2018-07-24 PROCEDURE — 99999 PR PBB SHADOW E&M-EST. PATIENT-LVL III: CPT | Mod: PBBFAC,,, | Performed by: OBSTETRICS & GYNECOLOGY

## 2018-07-24 PROCEDURE — 36591 DRAW BLOOD OFF VENOUS DEVICE: CPT

## 2018-07-24 PROCEDURE — A4216 STERILE WATER/SALINE, 10 ML: HCPCS | Performed by: OBSTETRICS & GYNECOLOGY

## 2018-07-24 PROCEDURE — 99214 OFFICE O/P EST MOD 30 MIN: CPT | Mod: S$PBB,,, | Performed by: OBSTETRICS & GYNECOLOGY

## 2018-07-24 PROCEDURE — 86304 IMMUNOASSAY TUMOR CA 125: CPT

## 2018-07-24 PROCEDURE — 99213 OFFICE O/P EST LOW 20 MIN: CPT | Mod: PBBFAC,25 | Performed by: OBSTETRICS & GYNECOLOGY

## 2018-07-24 PROCEDURE — 63600175 PHARM REV CODE 636 W HCPCS: Performed by: OBSTETRICS & GYNECOLOGY

## 2018-07-24 PROCEDURE — 25000003 PHARM REV CODE 250: Performed by: OBSTETRICS & GYNECOLOGY

## 2018-07-24 RX ORDER — HEPARIN 100 UNIT/ML
500 SYRINGE INTRAVENOUS
Status: COMPLETED | OUTPATIENT
Start: 2018-07-24 | End: 2018-07-24

## 2018-07-24 RX ORDER — SODIUM CHLORIDE 0.9 % (FLUSH) 0.9 %
10 SYRINGE (ML) INJECTION
Status: COMPLETED | OUTPATIENT
Start: 2018-07-24 | End: 2018-07-24

## 2018-07-24 RX ORDER — SODIUM CHLORIDE 0.9 % (FLUSH) 0.9 %
10 SYRINGE (ML) INJECTION
Status: CANCELLED | OUTPATIENT
Start: 2018-07-24

## 2018-07-24 RX ORDER — HEPARIN 100 UNIT/ML
500 SYRINGE INTRAVENOUS
Status: CANCELLED | OUTPATIENT
Start: 2018-07-24

## 2018-07-24 RX ADMIN — HEPARIN 500 UNITS: 100 SYRINGE at 09:07

## 2018-07-24 RX ADMIN — SODIUM CHLORIDE, PRESERVATIVE FREE 10 ML: 5 INJECTION INTRAVENOUS at 09:07

## 2018-07-24 NOTE — NURSING
Pt arrived for labs from port.  Port flushes easily with good blood return, labs sent.  Port de accessed.  Pt now going to MD appt.

## 2018-07-24 NOTE — PROGRESS NOTES
Subjective:       Patient ID: Murlene Mary Haase is a 68 y.o. female.    Chief Complaint: Peritoneal Cancer (3 month visit)    HPI     Patient comes in today for follow up for recurrent ovarian cancer. She has a BRCA 2 mutation. She could not tolerate niraparib.    is 10.     Sees Dr Hoover for her breast exams and management.      MMG: 3/20/2018: normal. See [facility]. Had MRI of both breast in April 2017 for bilateral breast pain. MRI per patient was negative. 12/2017 diagnostic MMG L breast negative (done for breast pain)  Colonoscopy: Aug 11, 2016: inflammation and diverticulosis  EGD: Aug 11, 2016: normal   CBE: Jan 2018      Her oncologic history is:    She was taken to the operating room on July 25, 2013 for preoperative diagnosis consistent with ovarian cancer. 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 At that time she underwent an omentectomy and bilateral salpingo-oophorectomy however she had bulky residual disease along the diaphragms as well as paracolic gutters and pelvis.    She was then treated with 3 cycles of dose dense paclitaxel and carboplatin. A CT scan obtained after this showed the following:    Subcentimeter hepatic lesions, too small to accurately characterize.    Subtle, increased soft tissue attenuation along the periphery of the right hepatic lobe that is nonspecific but is favored to represent a prominent right hemidiaphragm. Peritoneal thickening is thought to be less likely but cannot be entirely excluded   and correlation with prior imaging studies is advised if available.     Large amount of stool throughout entire colon.    L1 vertebral body compression fracture of unknown chronicity.   Small pericardial effusion.    She then received 2 more cycles of dose dense paclitaxel and carboplatin and then received one cycle of standard Taxol and carboplatin. Her last chemotherapy was December 26, 2013.    At the completion of 6  cycles of postoperative Taxol and carboplatin, her  was 7 and her CT scan of the abdomen and pelvis showed no evidence for disease.        In Feb 2016,  was 24.May 2016  was 35. June 2016: 33.   May 2016: CT scan abdomen and pelvis was negative.   August 2016:  was 31.  Nov 2016: C A 125 36  Feb 2017:  56. She has had a mildly elevated  in the 30s with negative scanning.    Feb 2017:  was 56. CT scan showed possible intussusception and Pelvis:  2.0 x 0.9 cm soft tissue attenuating focus in the anterior mid pelvis that abuts bowel, and is present on arterial/delayed phases.      PET was done and shows adenopathy as well.   June 2018: Completed 6 cycles of taxol and carboplatin on 6/29/2017 for recurrent ovarian cancer.  Received chemotherapy with Dr. Tana Deutsch.  CT scan after completion of 6 cycles of taxol and carboplatin shows stable right pelvic lymph node index lesion measuring 0.7 cm with some prominent lymph nodes in the jessenia hepatis, unchanged from prior.  There is no new lymphadenopathy identified.   was 8.       Oct 2017:  She tried Niraparib but had a reaction to it on two occasions and stopped this. Started with 3 tablets day one and had SOB and chest pain. Waited several days and restarted with 1 tablet. Had the same symptoms and did not want to resume.       Patient with a BRCA 2 mutation.         Review of Systems   Constitutional: Negative for chills, fatigue and fever.   Respiratory: Negative for cough, shortness of breath and wheezing.    Cardiovascular: Negative for chest pain, palpitations and leg swelling.   Gastrointestinal: Positive for constipation (Ibs) and diarrhea. Negative for abdominal pain, nausea and vomiting.   Genitourinary: Negative for difficulty urinating, dysuria, frequency, genital sores, hematuria, urgency, vaginal bleeding, vaginal discharge and vaginal pain.   Neurological: Negative for weakness.   Hematological: Negative for  "adenopathy. Does not bruise/bleed easily.   Psychiatric/Behavioral: The patient is not nervous/anxious.        Objective:   /68   Pulse 61   Ht 5' 4" (1.626 m)   Wt 62.7 kg (138 lb 3.7 oz)   BMI 23.73 kg/m²      Physical Exam   Constitutional: She is oriented to person, place, and time. She appears well-developed and well-nourished.   HENT:   Head: Normocephalic and atraumatic.   Eyes: No scleral icterus.   Neck: Neck supple. No tracheal deviation present. No thyroid mass and no thyromegaly present.   Cardiovascular: Normal rate and regular rhythm.    Pulmonary/Chest: Effort normal and breath sounds normal. She has no wheezes.   Abdominal: Soft. She exhibits no distension and no mass. There is no hepatosplenomegaly. There is no tenderness. There is no rebound and no guarding.   Genitourinary:   Genitourinary Comments: Bimanual exam:  Vulva: no lesions. Normal appearance  Urethra: Normal size and location. No lesions  Bladder: No masses or tenderness.  Vagina: normal mucosa. No lesion  Cervix: absent.   Uterus: absent.  Adnexa: no masses.  Rectovaginal: Not performed     Musculoskeletal: She exhibits no edema or tenderness.   Lymphadenopathy:     She has no cervical adenopathy.     She has no axillary adenopathy.        Right: No inguinal and no supraclavicular adenopathy present.        Left: No inguinal and no supraclavicular adenopathy present.   Neurological: She is alert and oriented to person, place, and time.   Skin: Skin is warm and dry.   Psychiatric: She has a normal mood and affect. Her behavior is normal. Judgment and thought content normal.       Assessment:       1. Primary peritoneal adenocarcinoma    2. BRCA2 gene mutation positive in female        Plan:   Primary peritoneal adenocarcinoma   DAMON    RTC in 3 months.   -     ; Future; Expected date: 10/24/2018    BRCA2 gene mutation positive in female        "

## 2018-10-01 ENCOUNTER — TELEPHONE (OUTPATIENT)
Dept: GYNECOLOGIC ONCOLOGY | Facility: CLINIC | Age: 68
End: 2018-10-01

## 2018-10-15 ENCOUNTER — TELEPHONE (OUTPATIENT)
Dept: GYNECOLOGIC ONCOLOGY | Facility: CLINIC | Age: 68
End: 2018-10-15

## 2018-10-15 NOTE — PROGRESS NOTES
Subjective:       Patient ID: Murlene Mary Haase is a 68 y.o. female.    Chief Complaint: primary peritoneal adenocarcinoma and Follow-up (3 mth)    HPI     Patient comes in today for follow up for recurrent ovarian cancer. She has a BRCA 2 mutation. She could not tolerate niraparib.    is 13.      Sees Dr Hoover for her breast exams and management.      MMG: 3/20/2018: normal. See [facility]. Had MRI of both breast in June 2018.       Colonoscopy: Aug 11, 2016: inflammation and diverticulosis  EGD: Aug 11, 2016: normal   CBE: Jan 2018      Her oncologic history is:    She was taken to the operating room on July 25, 2013 for preoperative diagnosis consistent with ovarian cancer. 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 At that time she underwent an omentectomy and bilateral salpingo-oophorectomy however she had bulky residual disease along the diaphragms as well as paracolic gutters and pelvis.    She was then treated with 3 cycles of dose dense paclitaxel and carboplatin. A CT scan obtained after this showed the following:    Subcentimeter hepatic lesions, too small to accurately characterize.    Subtle, increased soft tissue attenuation along the periphery of the right hepatic lobe that is nonspecific but is favored to represent a prominent right hemidiaphragm. Peritoneal thickening is thought to be less likely but cannot be entirely excluded   and correlation with prior imaging studies is advised if available.     Large amount of stool throughout entire colon.    L1 vertebral body compression fracture of unknown chronicity.   Small pericardial effusion.    She then received 2 more cycles of dose dense paclitaxel and carboplatin and then received one cycle of standard Taxol and carboplatin. Her last chemotherapy was December 26, 2013.    At the completion of 6 cycles of postoperative Taxol and carboplatin, her  was 7 and her CT scan of the  abdomen and pelvis showed no evidence for disease.        In Feb 2016,  was 24.May 2016  was 35. June 2016: 33.   May 2016: CT scan abdomen and pelvis was negative.   August 2016:  was 31.  Nov 2016: C A 125 36  Feb 2017:  56. She has had a mildly elevated  in the 30s with negative scanning.    Feb 2017:  was 56. CT scan showed possible intussusception and Pelvis:  2.0 x 0.9 cm soft tissue attenuating focus in the anterior mid pelvis that abuts bowel, and is present on arterial/delayed phases.      PET was done and shows adenopathy as well.   June 2018: Completed 6 cycles of taxol and carboplatin on 6/29/2017 for recurrent ovarian cancer.  Received chemotherapy with Dr. Tana Deutsch.  CT scan after completion of 6 cycles of taxol and carboplatin shows stable right pelvic lymph node index lesion measuring 0.7 cm with some prominent lymph nodes in the jessenia hepatis, unchanged from prior.  There is no new lymphadenopathy identified.   was 8.       Oct 2017:  She tried Niraparib but had a reaction to it on two occasions and stopped this. Started with 3 tablets day one and had SOB and chest pain. Waited several days and restarted with 1 tablet. Had the same symptoms and did not want to resume.       Patient with a BRCA 2 mutation.         Review of Systems   Constitutional: Negative for chills, fatigue and fever.   Respiratory: Negative for cough, shortness of breath and wheezing.    Cardiovascular: Negative for chest pain, palpitations and leg swelling.   Gastrointestinal: Negative for abdominal pain, constipation, diarrhea, nausea and vomiting.   Genitourinary: Negative for difficulty urinating, dysuria, frequency, genital sores, hematuria, urgency, vaginal bleeding, vaginal discharge and vaginal pain.   Neurological: Negative for weakness.   Hematological: Negative for adenopathy. Does not bruise/bleed easily.   Psychiatric/Behavioral: The patient is not nervous/anxious.       "  Objective:   /61   Pulse 60   Ht 5' 4" (1.626 m)   Wt 63.7 kg (140 lb 6.9 oz)   BMI 24.11 kg/m²      Physical Exam   Constitutional: She is oriented to person, place, and time. She appears well-developed and well-nourished.   HENT:   Head: Normocephalic and atraumatic.   Eyes: No scleral icterus.   Neck: Neck supple. No tracheal deviation present. No thyroid mass and no thyromegaly present.   Cardiovascular: Normal rate and regular rhythm.   Pulmonary/Chest: Effort normal and breath sounds normal. She has no wheezes.   Abdominal: Soft. She exhibits no distension and no mass. There is no hepatosplenomegaly. There is no tenderness. There is no rebound and no guarding.   Genitourinary:   Genitourinary Comments: Bimanual exam:  Vulva: no lesions. Normal appearance  Urethra: Normal size and location. No lesions  Bladder: No masses or tenderness.  Vagina: normal mucosa. No lesion  Cervix: absent.   Uterus: absent.  Adnexa: no masses.  Rectovaginal: Not performed     Musculoskeletal: She exhibits no edema or tenderness.   Lymphadenopathy:     She has no cervical adenopathy.     She has no axillary adenopathy.        Right: No inguinal and no supraclavicular adenopathy present.        Left: No inguinal and no supraclavicular adenopathy present.   Neurological: She is alert and oriented to person, place, and time.   Skin: Skin is warm and dry.   Psychiatric: She has a normal mood and affect. Her behavior is normal. Judgment and thought content normal.       Assessment:       1. Primary peritoneal adenocarcinoma    2. BRCA2 gene mutation positive in female        Plan:   Primary peritoneal adenocarcinoma    BRCA2 gene mutation positive in female        "

## 2018-10-16 ENCOUNTER — TELEPHONE (OUTPATIENT)
Dept: GYNECOLOGIC ONCOLOGY | Facility: CLINIC | Age: 68
End: 2018-10-16

## 2018-10-16 ENCOUNTER — OFFICE VISIT (OUTPATIENT)
Dept: GYNECOLOGIC ONCOLOGY | Facility: CLINIC | Age: 68
End: 2018-10-16
Payer: MEDICARE

## 2018-10-16 ENCOUNTER — INFUSION (OUTPATIENT)
Dept: INFUSION THERAPY | Facility: HOSPITAL | Age: 68
End: 2018-10-16
Attending: OBSTETRICS & GYNECOLOGY
Payer: MEDICARE

## 2018-10-16 VITALS
HEART RATE: 60 BPM | HEIGHT: 64 IN | WEIGHT: 140.44 LBS | BODY MASS INDEX: 23.98 KG/M2 | DIASTOLIC BLOOD PRESSURE: 61 MMHG | SYSTOLIC BLOOD PRESSURE: 126 MMHG

## 2018-10-16 DIAGNOSIS — C48.2 PRIMARY PERITONEAL ADENOCARCINOMA: Primary | ICD-10-CM

## 2018-10-16 DIAGNOSIS — Z15.09 BRCA2 GENE MUTATION POSITIVE IN FEMALE: ICD-10-CM

## 2018-10-16 DIAGNOSIS — Z15.02 BRCA2 GENE MUTATION POSITIVE IN FEMALE: ICD-10-CM

## 2018-10-16 DIAGNOSIS — Z15.01 BRCA2 GENE MUTATION POSITIVE IN FEMALE: ICD-10-CM

## 2018-10-16 LAB — CANCER AG125 SERPL-ACNC: 13 U/ML

## 2018-10-16 PROCEDURE — 99999 PR PBB SHADOW E&M-EST. PATIENT-LVL III: CPT | Mod: PBBFAC,,, | Performed by: OBSTETRICS & GYNECOLOGY

## 2018-10-16 PROCEDURE — 36591 DRAW BLOOD OFF VENOUS DEVICE: CPT

## 2018-10-16 PROCEDURE — 63600175 PHARM REV CODE 636 W HCPCS: Performed by: OBSTETRICS & GYNECOLOGY

## 2018-10-16 PROCEDURE — 25000003 PHARM REV CODE 250: Performed by: OBSTETRICS & GYNECOLOGY

## 2018-10-16 PROCEDURE — A4216 STERILE WATER/SALINE, 10 ML: HCPCS | Performed by: OBSTETRICS & GYNECOLOGY

## 2018-10-16 PROCEDURE — 99214 OFFICE O/P EST MOD 30 MIN: CPT | Mod: S$PBB,,, | Performed by: OBSTETRICS & GYNECOLOGY

## 2018-10-16 PROCEDURE — 86304 IMMUNOASSAY TUMOR CA 125: CPT

## 2018-10-16 PROCEDURE — 99213 OFFICE O/P EST LOW 20 MIN: CPT | Mod: PBBFAC,25 | Performed by: OBSTETRICS & GYNECOLOGY

## 2018-10-16 RX ORDER — SODIUM CHLORIDE 0.9 % (FLUSH) 0.9 %
10 SYRINGE (ML) INJECTION
Status: CANCELLED | OUTPATIENT
Start: 2018-10-16

## 2018-10-16 RX ORDER — HEPARIN 100 UNIT/ML
500 SYRINGE INTRAVENOUS
Status: COMPLETED | OUTPATIENT
Start: 2018-10-16 | End: 2018-10-16

## 2018-10-16 RX ORDER — HEPARIN 100 UNIT/ML
500 SYRINGE INTRAVENOUS
Status: CANCELLED | OUTPATIENT
Start: 2018-10-16

## 2018-10-16 RX ORDER — SODIUM CHLORIDE 0.9 % (FLUSH) 0.9 %
10 SYRINGE (ML) INJECTION
Status: COMPLETED | OUTPATIENT
Start: 2018-10-16 | End: 2018-10-16

## 2018-10-16 RX ADMIN — Medication 10 ML: at 09:10

## 2018-10-16 RX ADMIN — HEPARIN 500 UNITS: 100 SYRINGE at 09:10

## 2018-10-16 NOTE — TELEPHONE ENCOUNTER
----- Message from Juan Foss MD sent at 10/16/2018 12:11 PM CDT -----  Please let her know that her  is normal. Thank you.

## 2018-10-16 NOTE — TELEPHONE ENCOUNTER
Called patient per Dr. Foss informing her that her  is normal. Patient voiced understanding. CHRISTIANO

## 2018-10-16 NOTE — NURSING
Patient here for blood draw from right chest port-accesses easily with good blood return-blood to lab-line flushed-needle removed.

## 2018-11-19 ENCOUNTER — TELEPHONE (OUTPATIENT)
Dept: GYNECOLOGIC ONCOLOGY | Facility: CLINIC | Age: 68
End: 2018-11-19

## 2018-11-29 ENCOUNTER — TELEPHONE (OUTPATIENT)
Dept: SURGERY | Facility: CLINIC | Age: 68
End: 2018-11-29

## 2018-11-29 NOTE — TELEPHONE ENCOUNTER
----- Message from Gifty Taylor sent at 11/29/2018 12:41 PM CST -----  Contact: Self  Patient cancelled mammogram and appt. Says she does not want the mammogram and will call back in June for her next MRI and f/u appt with Dr. Hoover

## 2018-11-29 NOTE — TELEPHONE ENCOUNTER
Left VM with my direct contact information, patient advised to give a return call with any questions or concerns regarding cancellation of mammo and F/U

## 2018-12-14 ENCOUNTER — TELEPHONE (OUTPATIENT)
Dept: GYNECOLOGIC ONCOLOGY | Facility: CLINIC | Age: 68
End: 2018-12-14

## 2019-01-21 NOTE — PROGRESS NOTES
Subjective:       Patient ID: Murlene Mary Haase is a 68 y.o. female.    Chief Complaint: Primary peritoneal adenocarcinoma (3mth f/u)    HPI   Patient comes in today for her WWE and follow up for recurrent ovarian cancer. She has a BRCA 2 mutation. She could not tolerate niraparib.    is 13.        She saw her GI doctor because she was having worsening diarrhea. Stool sample was non infectious.     Sees Dr Hoover for her breast exams and management. was to have MRI in Nov 2018 but did not.     Refusing annual mmg. Wants an MRI instead.      MMG: 3/20/2018: normal. See [facility]. Had MRI of both breast in June 2018.         Colonoscopy: Aug 11, 2016: inflammation and diverticulosis  EGD: Aug 11, 2016: normal   CBE: Jan 2018      Her oncologic history is:    July 2013: She was taken to the operating room on July 25, 2013 for preoperative diagnosis consistent with ovarian cancer. 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 At that time she underwent an omentectomy and bilateral salpingo-oophorectomy however she had bulky residual disease along the diaphragms as well as paracolic gutters and pelvis.    She was then treated with 3 cycles of dose dense paclitaxel and carboplatin. A CT scan obtained after this showed the following:    Subcentimeter hepatic lesions, too small to accurately characterize.    Subtle, increased soft tissue attenuation along the periphery of the right hepatic lobe that is nonspecific but is favored to represent a prominent right hemidiaphragm. Peritoneal thickening is thought to be less likely but cannot be entirely excluded   and correlation with prior imaging studies is advised if available.     Large amount of stool throughout entire colon.    L1 vertebral body compression fracture of unknown chronicity.   Small pericardial effusion.    She then received 2 more cycles of dose dense paclitaxel and carboplatin and then received one  cycle of standard Taxol and carboplatin.    Dec 2013:  Her last chemotherapy was December 26, 2013.    At the completion of 6 cycles of postoperative Taxol and carboplatin, her  was 7 and her CT scan of the abdomen and pelvis showed no evidence for disease.        Feb 2016,  was 24.May 2016  was 35. June 2016: 33.   May 2016: CT scan abdomen and pelvis was negative.   August 2016:  was 31.  Nov 2016: C A 125 36  Feb 2017:  56. She has had a mildly elevated  in the 30s with negative scanning.   Feb 2017:  was 56. CT scan showed possible intussusception and Pelvis:  2.0 x 0.9 cm soft tissue attenuating focus in the anterior mid pelvis that abuts bowel, and is present on arterial/delayed phases.      PET was done and shows adenopathy as well.   June 2018: Completed 6 cycles of taxol and carboplatin on 6/29/2017 for recurrent ovarian cancer.  Received chemotherapy with Dr. Tana Deutsch.  CT scan after completion of 6 cycles of taxol and carboplatin shows stable right pelvic lymph node index lesion measuring 0.7 cm with some prominent lymph nodes in the jessenia hepatis, unchanged from prior.  There is no new lymphadenopathy identified.   was 8.       Oct 2017:  She tried Niraparib but had a reaction to it on two occasions and stopped this. Started with 3 tablets day one and had SOB and chest pain. Waited several days and restarted with 1 tablet. Had the same symptoms and did not want to resume.       Patient with a BRCA 2 mutation.         Review of Systems   Constitutional: Negative for chills, fatigue and fever.   Respiratory: Negative for cough, shortness of breath and wheezing.    Cardiovascular: Negative for chest pain, palpitations and leg swelling.   Gastrointestinal: Positive for abdominal pain and diarrhea. Negative for constipation, nausea and vomiting.   Genitourinary: Negative for difficulty urinating, dysuria, frequency, genital sores, hematuria, urgency,  vaginal bleeding, vaginal discharge and vaginal pain.   Neurological: Negative for weakness.   Hematological: Negative for adenopathy. Does not bruise/bleed easily.   Psychiatric/Behavioral: The patient is not nervous/anxious.        Objective:   /60   Pulse 101   Wt 60.8 kg (134 lb)   BMI 23.00 kg/m²      Physical Exam   Constitutional: She is oriented to person, place, and time. She appears well-developed and well-nourished.   HENT:   Head: Normocephalic and atraumatic.   Eyes: No scleral icterus.   Neck: No tracheal deviation present. No thyroid mass and no thyromegaly present.   Cardiovascular: Normal rate and regular rhythm.   Pulmonary/Chest: Effort normal and breath sounds normal. She has no wheezes. Right breast exhibits no mass, no nipple discharge, no skin change and no tenderness. Left breast exhibits no mass, no nipple discharge, no skin change and no tenderness.   Abdominal: She exhibits no distension and no mass. There is no hepatosplenomegaly. There is no tenderness. There is no rebound and no guarding.   Genitourinary:   Genitourinary Comments: Bimanual exam:  Vulva: no lesions. Normal appearance  Urethra: Normal size and location. No lesions  Bladder: No masses or tenderness.  Vagina: normal mucosa. No lesion  Cervix: absent.   Uterus: absent.  Adnexa: no masses.  Rectovaginal: No posterior cul de sac thickening or nodularity.  Rectal: no masses. Nontender. Normal tone.      Musculoskeletal: She exhibits no edema or tenderness.   Lymphadenopathy:     She has no cervical adenopathy.     She has no axillary adenopathy.        Right: No inguinal and no supraclavicular adenopathy present.        Left: No inguinal and no supraclavicular adenopathy present.   Neurological: She is alert and oriented to person, place, and time.   Skin: Skin is warm and dry. No rash noted.   Psychiatric: She has a normal mood and affect. Her behavior is normal. Judgment and thought content normal.       Assessment:        1. Primary peritoneal adenocarcinoma    2. BRCA2 gene mutation positive in female    3. Encounter for pelvic screening for cancer    4. Well woman exam with routine gynecological exam        Plan:   Primary peritoneal adenocarcinoma   DAMON    RTC in 3 months.   -     ; Future; Expected date: 04/23/2019    BRCA2 gene mutation positive in female  -     MRI Breast w/wo Contrast, w/CAD, Bilateral; Future; Expected date: 01/23/2019    Encounter for pelvic screening for cancer    Well woman exam with routine gynecological exam

## 2019-01-22 ENCOUNTER — TELEPHONE (OUTPATIENT)
Dept: GYNECOLOGIC ONCOLOGY | Facility: CLINIC | Age: 69
End: 2019-01-22

## 2019-01-23 ENCOUNTER — LAB VISIT (OUTPATIENT)
Dept: LAB | Facility: HOSPITAL | Age: 69
End: 2019-01-23
Attending: OBSTETRICS & GYNECOLOGY
Payer: MEDICARE

## 2019-01-23 ENCOUNTER — OFFICE VISIT (OUTPATIENT)
Dept: GYNECOLOGIC ONCOLOGY | Facility: CLINIC | Age: 69
End: 2019-01-23
Payer: MEDICARE

## 2019-01-23 ENCOUNTER — TELEPHONE (OUTPATIENT)
Dept: GYNECOLOGIC ONCOLOGY | Facility: CLINIC | Age: 69
End: 2019-01-23

## 2019-01-23 VITALS
WEIGHT: 134 LBS | BODY MASS INDEX: 23 KG/M2 | DIASTOLIC BLOOD PRESSURE: 60 MMHG | HEART RATE: 101 BPM | SYSTOLIC BLOOD PRESSURE: 132 MMHG

## 2019-01-23 DIAGNOSIS — Z15.01 BRCA2 GENE MUTATION POSITIVE IN FEMALE: ICD-10-CM

## 2019-01-23 DIAGNOSIS — C48.2 PRIMARY PERITONEAL ADENOCARCINOMA: Primary | ICD-10-CM

## 2019-01-23 DIAGNOSIS — Z12.89 ENCOUNTER FOR PELVIC SCREENING FOR CANCER: ICD-10-CM

## 2019-01-23 DIAGNOSIS — C48.2 PRIMARY PERITONEAL ADENOCARCINOMA: ICD-10-CM

## 2019-01-23 DIAGNOSIS — Z01.419 WELL WOMAN EXAM WITH ROUTINE GYNECOLOGICAL EXAM: ICD-10-CM

## 2019-01-23 DIAGNOSIS — Z15.02 BRCA2 GENE MUTATION POSITIVE IN FEMALE: ICD-10-CM

## 2019-01-23 DIAGNOSIS — Z15.09 BRCA2 GENE MUTATION POSITIVE IN FEMALE: ICD-10-CM

## 2019-01-23 LAB — CANCER AG125 SERPL-ACNC: 16 U/ML

## 2019-01-23 PROCEDURE — G0101 PR CA SCREEN;PELVIC/BREAST EXAM: ICD-10-PCS | Mod: S$PBB,,, | Performed by: OBSTETRICS & GYNECOLOGY

## 2019-01-23 PROCEDURE — 86304 IMMUNOASSAY TUMOR CA 125: CPT

## 2019-01-23 PROCEDURE — 99213 OFFICE O/P EST LOW 20 MIN: CPT | Mod: PBBFAC,25 | Performed by: OBSTETRICS & GYNECOLOGY

## 2019-01-23 PROCEDURE — 99999 PR PBB SHADOW E&M-EST. PATIENT-LVL III: ICD-10-PCS | Mod: PBBFAC,,, | Performed by: OBSTETRICS & GYNECOLOGY

## 2019-01-23 PROCEDURE — G0101 CA SCREEN;PELVIC/BREAST EXAM: HCPCS | Mod: S$PBB,,, | Performed by: OBSTETRICS & GYNECOLOGY

## 2019-01-23 PROCEDURE — 36415 COLL VENOUS BLD VENIPUNCTURE: CPT

## 2019-01-23 PROCEDURE — G0101 CA SCREEN;PELVIC/BREAST EXAM: HCPCS | Mod: PBBFAC | Performed by: OBSTETRICS & GYNECOLOGY

## 2019-01-23 PROCEDURE — 99999 PR PBB SHADOW E&M-EST. PATIENT-LVL III: CPT | Mod: PBBFAC,,, | Performed by: OBSTETRICS & GYNECOLOGY

## 2019-01-24 ENCOUNTER — TELEPHONE (OUTPATIENT)
Dept: GYNECOLOGIC ONCOLOGY | Facility: CLINIC | Age: 69
End: 2019-01-24

## 2019-01-28 ENCOUNTER — TELEPHONE (OUTPATIENT)
Dept: GYNECOLOGIC ONCOLOGY | Facility: CLINIC | Age: 69
End: 2019-01-28

## 2019-04-23 ENCOUNTER — TELEPHONE (OUTPATIENT)
Dept: GYNECOLOGIC ONCOLOGY | Facility: CLINIC | Age: 69
End: 2019-04-23

## 2019-04-23 NOTE — PROGRESS NOTES
Subjective:       Patient ID: Murlene Mary Haase is a 69 y.o. female.    Chief Complaint: Primary peritoneal adenocarcinoma (3 mth )    HPI   Patient comes in today for follow up for recurrent ovarian cancer. She has a BRCA 2 mutation. She could not tolerate niraparib.    is 19.            MM2019: normal. See [facility].   MRI: breast: 2019: normal.         Colonoscopy: Aug 11, 2016: inflammation and diverticulosis  EGD: Aug 11, 2016: normal   CBE: 2019      Her oncologic history is:    2013: She was taken to the operating room on 2013 for preoperative diagnosis consistent with ovarian cancer. 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 At that time she underwent an omentectomy and bilateral salpingo-oophorectomy however she had bulky residual disease along the diaphragms as well as paracolic gutters and pelvis.    She was then treated with 3 cycles of dose dense paclitaxel and carboplatin. A CT scan obtained after this showed the following:    Subcentimeter hepatic lesions, too small to accurately characterize.    Subtle, increased soft tissue attenuation along the periphery of the right hepatic lobe that is nonspecific but is favored to represent a prominent right hemidiaphragm. Peritoneal thickening is thought to be less likely but cannot be entirely excluded   and correlation with prior imaging studies is advised if available.     Large amount of stool throughout entire colon.    L1 vertebral body compression fracture of unknown chronicity.   Small pericardial effusion.    She then received 2 more cycles of dose dense paclitaxel and carboplatin and then received one cycle of standard Taxol and carboplatin.     Dec 2013:  Her last chemotherapy was 2013.    At the completion of 6 cycles of postoperative Taxol and carboplatin, her  was 7 and her CT scan of the abdomen and pelvis showed no evidence  for disease.        Feb 2016,  was 24.May 2016  was 35. June 2016: 33.   May 2016: CT scan abdomen and pelvis was negative.   August 2016:  was 31.  Nov 2016: C A 125 36  Feb 2017:  56. She has had a mildly elevated  in the 30s with negative scanning.   Feb 2017:  was 56. CT scan showed possible intussusception and Pelvis:  2.0 x 0.9 cm soft tissue attenuating focus in the anterior mid pelvis that abuts bowel, and is present on arterial/delayed phases.      PET was done and shows adenopathy as well.   June 2018: Completed 6 cycles of taxol and carboplatin on 6/29/2017 for recurrent ovarian cancer.  Received chemotherapy with Dr. Tana Deutsch.  CT scan after completion of 6 cycles of taxol and carboplatin shows stable right pelvic lymph node index lesion measuring 0.7 cm with some prominent lymph nodes in the jessenia hepatis, unchanged from prior.  There is no new lymphadenopathy identified.   was 8.       Oct 2017:  She tried Niraparib but had a reaction to it on two occasions and stopped this. Started with 3 tablets day one and had SOB and chest pain. Waited several days and restarted with 1 tablet. Had the same symptoms and did not want to resume.       Patient with a BRCA 2 mutation.      Review of Systems   Constitutional: Negative for chills, fatigue and fever.   Respiratory: Negative for cough, shortness of breath and wheezing.    Cardiovascular: Negative for chest pain, palpitations and leg swelling.   Gastrointestinal: Negative for abdominal pain, constipation, diarrhea, nausea and vomiting.   Genitourinary: Negative for difficulty urinating, dysuria, frequency, genital sores, hematuria, urgency, vaginal bleeding, vaginal discharge and vaginal pain.   Neurological: Negative for weakness.   Hematological: Negative for adenopathy. Does not bruise/bleed easily.   Psychiatric/Behavioral: The patient is not nervous/anxious.        Objective:   /73   Pulse 76   Ht  "5' 4" (1.626 m)   Wt 61.9 kg (136 lb 7.4 oz)   BMI 23.42 kg/m²      Physical Exam    Assessment:       1. Primary peritoneal adenocarcinoma    2. BRCA2 gene mutation positive in female        Plan:   Primary peritoneal adenocarcinoma   DAMON   RTC in 4 months    -     ; Future; Expected date: 08/23/2019    BRCA2 gene mutation positive in female        "

## 2019-04-24 ENCOUNTER — OFFICE VISIT (OUTPATIENT)
Dept: GYNECOLOGIC ONCOLOGY | Facility: CLINIC | Age: 69
End: 2019-04-24
Payer: MEDICARE

## 2019-04-24 ENCOUNTER — LAB VISIT (OUTPATIENT)
Dept: LAB | Facility: HOSPITAL | Age: 69
End: 2019-04-24
Attending: OBSTETRICS & GYNECOLOGY
Payer: MEDICARE

## 2019-04-24 VITALS
BODY MASS INDEX: 23.29 KG/M2 | SYSTOLIC BLOOD PRESSURE: 127 MMHG | HEIGHT: 64 IN | WEIGHT: 136.44 LBS | DIASTOLIC BLOOD PRESSURE: 73 MMHG | HEART RATE: 76 BPM

## 2019-04-24 DIAGNOSIS — C48.2 PRIMARY PERITONEAL ADENOCARCINOMA: ICD-10-CM

## 2019-04-24 DIAGNOSIS — Z15.02 BRCA2 GENE MUTATION POSITIVE IN FEMALE: ICD-10-CM

## 2019-04-24 DIAGNOSIS — C48.2 PRIMARY PERITONEAL ADENOCARCINOMA: Primary | ICD-10-CM

## 2019-04-24 DIAGNOSIS — Z15.09 BRCA2 GENE MUTATION POSITIVE IN FEMALE: ICD-10-CM

## 2019-04-24 DIAGNOSIS — Z15.01 BRCA2 GENE MUTATION POSITIVE IN FEMALE: ICD-10-CM

## 2019-04-24 LAB — CANCER AG125 SERPL-ACNC: 19 U/ML (ref 0–30)

## 2019-04-24 PROCEDURE — 99214 PR OFFICE/OUTPT VISIT, EST, LEVL IV, 30-39 MIN: ICD-10-PCS | Mod: S$PBB,,, | Performed by: OBSTETRICS & GYNECOLOGY

## 2019-04-24 PROCEDURE — 99999 PR PBB SHADOW E&M-EST. PATIENT-LVL III: ICD-10-PCS | Mod: PBBFAC,,, | Performed by: OBSTETRICS & GYNECOLOGY

## 2019-04-24 PROCEDURE — 36415 COLL VENOUS BLD VENIPUNCTURE: CPT

## 2019-04-24 PROCEDURE — 86304 IMMUNOASSAY TUMOR CA 125: CPT

## 2019-04-24 PROCEDURE — 99999 PR PBB SHADOW E&M-EST. PATIENT-LVL III: CPT | Mod: PBBFAC,,, | Performed by: OBSTETRICS & GYNECOLOGY

## 2019-04-24 PROCEDURE — 99214 OFFICE O/P EST MOD 30 MIN: CPT | Mod: S$PBB,,, | Performed by: OBSTETRICS & GYNECOLOGY

## 2019-04-24 PROCEDURE — 99213 OFFICE O/P EST LOW 20 MIN: CPT | Mod: PBBFAC | Performed by: OBSTETRICS & GYNECOLOGY

## 2019-05-30 ENCOUNTER — TELEPHONE (OUTPATIENT)
Dept: GYNECOLOGIC ONCOLOGY | Facility: CLINIC | Age: 69
End: 2019-05-30

## 2019-05-30 NOTE — TELEPHONE ENCOUNTER
----- Message from Dipika Palacios sent at 5/30/2019 12:48 PM CDT -----  Contact: Татьяна ( [facility] )  Name of Who is Calling: Татьяна ( [facility] )       What is the request in detail: Татьяна ( [facility] ) is requesting a call from staff she needs the patients recent clinic notes from April and lab results and would like to speak with staff  .....Please contact to further discuss and advise.     Can the clinic reply by [facility]: no     What Number to Call Back if not in [facility]: [phone] speak with Diann

## 2019-08-22 ENCOUNTER — TELEPHONE (OUTPATIENT)
Dept: ADMINISTRATIVE | Facility: OTHER | Age: 69
End: 2019-08-22

## 2019-08-29 ENCOUNTER — TELEPHONE (OUTPATIENT)
Dept: ADMINISTRATIVE | Facility: OTHER | Age: 69
End: 2019-08-29

## 2019-08-29 NOTE — PROGRESS NOTES
Subjective:       Patient ID: Murlene Mary Haase is a 69 y.o. female.    Chief Complaint: primary peritoneal adenocarcinoma    HPI     Patient comes in today for follow up for recurrent ovarian cancer. She has a BRCA 2 mutation. She could not tolerate niraparib.    is 17.     8 lb intentional weight loss.            MM2019: normal. See [facility].   MRI: breast: 2019: normal. she only does MRI. Refused MMG.   CBE: 2019     Colonoscopy: Aug 11, 2016: inflammation and diverticulosis  EGD: Aug 11, 2016: normal         Her oncologic history is:    2013: She was taken to the operating room on 2013 for preoperative diagnosis consistent with ovarian cancer. 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 At that time she underwent an omentectomy and bilateral salpingo-oophorectomy however she had bulky residual disease along the diaphragms as well as paracolic gutters and pelvis.    She was then treated with 3 cycles of dose dense paclitaxel and carboplatin. A CT scan obtained after this showed the following:    Subcentimeter hepatic lesions, too small to accurately characterize.    Subtle, increased soft tissue attenuation along the periphery of the right hepatic lobe that is nonspecific but is favored to represent a prominent right hemidiaphragm. Peritoneal thickening is thought to be less likely but cannot be entirely excluded   and correlation with prior imaging studies is advised if available.     Large amount of stool throughout entire colon.    L1 vertebral body compression fracture of unknown chronicity.   Small pericardial effusion.    She then received 2 more cycles of dose dense paclitaxel and carboplatin and then received one cycle of standard Taxol and carboplatin.     Dec 2013:  Her last chemotherapy was 2013.    At the completion of 6 cycles of postoperative Taxol and carboplatin, her  was 7 and  her CT scan of the abdomen and pelvis showed no evidence for disease.        Feb 2016,  was 24.May 2016  was 35. June 2016: 33.   May 2016: CT scan abdomen and pelvis was negative.   August 2016:  was 31.  Nov 2016: C A 125 36  Feb 2017:  56. She has had a mildly elevated  in the 30s with negative scanning.   Feb 2017:  was 56. CT scan showed possible intussusception and Pelvis:  2.0 x 0.9 cm soft tissue attenuating focus in the anterior mid pelvis that abuts bowel, and is present on arterial/delayed phases.      PET was done and shows adenopathy as well.   June 2018: Completed 6 cycles of taxol and carboplatin on 6/29/2017 for recurrent ovarian cancer.  Received chemotherapy with Dr. Tana Deutsch.  CT scan after completion of 6 cycles of taxol and carboplatin shows stable right pelvic lymph node index lesion measuring 0.7 cm with some prominent lymph nodes in the jessenia hepatis, unchanged from prior.  There is no new lymphadenopathy identified.   was 8.       Oct 2017:  She tried Niraparib but had a reaction to it on two occasions and stopped this. Started with 3 tablets day one and had SOB and chest pain. Waited several days and restarted with 1 tablet. Had the same symptoms and did not want to resume.       Patient with a BRCA 2 mutation.      Review of Systems   Constitutional: Negative for chills, fatigue and fever.   Respiratory: Negative for cough, shortness of breath and wheezing.    Cardiovascular: Negative for chest pain, palpitations and leg swelling.   Gastrointestinal: Negative for abdominal pain, constipation, diarrhea, nausea and vomiting.   Genitourinary: Negative for difficulty urinating, dysuria, frequency, genital sores, hematuria, urgency, vaginal bleeding, vaginal discharge and vaginal pain.   Neurological: Negative for weakness.   Hematological: Negative for adenopathy. Does not bruise/bleed easily.   Psychiatric/Behavioral: The patient is not  "nervous/anxious.        Objective:   BP (!) 168/74   Pulse 72   Ht 5' 4" (1.626 m)   Wt 58.1 kg (128 lb 1.4 oz)   BMI 21.99 kg/m²      Physical Exam   Constitutional: She is oriented to person, place, and time. She appears well-developed and well-nourished.   HENT:   Head: Normocephalic and atraumatic.   Eyes: No scleral icterus.   Neck: Neck supple. No tracheal deviation present. No thyroid mass and no thyromegaly present.   Cardiovascular: Normal rate and regular rhythm.   Pulmonary/Chest: Effort normal and breath sounds normal. She has no wheezes.   Abdominal: Soft. She exhibits no distension and no mass. There is no hepatosplenomegaly. There is no tenderness. There is no rebound and no guarding.   Genitourinary:   Genitourinary Comments: Bimanual exam:  Vulva: no lesions. Normal appearance  Urethra: Normal size and location. No lesions  Bladder: No masses or tenderness.  Vagina: normal mucosa. No lesion  Cervix: absent.   Uterus: absent.  Adnexa: no masses.  Rectovaginal: No posterior cul de sac thickening or nodularity.  Rectal: no masses. Nontender. Normal tone.      Musculoskeletal: She exhibits no edema or tenderness.   Lymphadenopathy:     She has no cervical adenopathy.     She has no axillary adenopathy.        Right: No inguinal and no supraclavicular adenopathy present.        Left: No inguinal and no supraclavicular adenopathy present.   Neurological: She is alert and oriented to person, place, and time.   Skin: Skin is warm and dry.   Psychiatric: She has a normal mood and affect. Her behavior is normal. Judgment and thought content normal.       Assessment:       1. Primary peritoneal adenocarcinoma    2. BRCA2 gene mutation positive in female        Plan:   Primary peritoneal adenocarcinoma   [facility]   RTC in 4 months    -     ; Future; Expected date: 12/30/2019    BRCA2 gene mutation positive in female        "

## 2019-08-30 ENCOUNTER — OFFICE VISIT (OUTPATIENT)
Dept: GYNECOLOGIC ONCOLOGY | Facility: CLINIC | Age: 69
End: 2019-08-30
Payer: MEDICARE

## 2019-08-30 ENCOUNTER — LAB VISIT (OUTPATIENT)
Dept: LAB | Facility: HOSPITAL | Age: 69
End: 2019-08-30
Attending: OBSTETRICS & GYNECOLOGY
Payer: MEDICARE

## 2019-08-30 VITALS
WEIGHT: 128.06 LBS | BODY MASS INDEX: 21.86 KG/M2 | HEART RATE: 72 BPM | HEIGHT: 64 IN | DIASTOLIC BLOOD PRESSURE: 74 MMHG | SYSTOLIC BLOOD PRESSURE: 168 MMHG

## 2019-08-30 DIAGNOSIS — Z15.02 BRCA2 GENE MUTATION POSITIVE IN FEMALE: ICD-10-CM

## 2019-08-30 DIAGNOSIS — Z15.01 BRCA2 GENE MUTATION POSITIVE IN FEMALE: ICD-10-CM

## 2019-08-30 DIAGNOSIS — C48.2 PRIMARY PERITONEAL ADENOCARCINOMA: Primary | ICD-10-CM

## 2019-08-30 DIAGNOSIS — C48.2 PRIMARY PERITONEAL ADENOCARCINOMA: ICD-10-CM

## 2019-08-30 DIAGNOSIS — Z15.09 BRCA2 GENE MUTATION POSITIVE IN FEMALE: ICD-10-CM

## 2019-08-30 LAB — CANCER AG125 SERPL-ACNC: 17 U/ML (ref 0–30)

## 2019-08-30 PROCEDURE — 99999 PR PBB SHADOW E&M-EST. PATIENT-LVL III: CPT | Mod: PBBFAC,,, | Performed by: OBSTETRICS & GYNECOLOGY

## 2019-08-30 PROCEDURE — 99213 OFFICE O/P EST LOW 20 MIN: CPT | Mod: PBBFAC | Performed by: OBSTETRICS & GYNECOLOGY

## 2019-08-30 PROCEDURE — 36415 COLL VENOUS BLD VENIPUNCTURE: CPT

## 2019-08-30 PROCEDURE — 99213 PR OFFICE/OUTPT VISIT, EST, LEVL III, 20-29 MIN: ICD-10-PCS | Mod: S$PBB,,, | Performed by: OBSTETRICS & GYNECOLOGY

## 2019-08-30 PROCEDURE — 99213 OFFICE O/P EST LOW 20 MIN: CPT | Mod: S$PBB,,, | Performed by: OBSTETRICS & GYNECOLOGY

## 2019-08-30 PROCEDURE — 99999 PR PBB SHADOW E&M-EST. PATIENT-LVL III: ICD-10-PCS | Mod: PBBFAC,,, | Performed by: OBSTETRICS & GYNECOLOGY

## 2019-08-30 PROCEDURE — 86304 IMMUNOASSAY TUMOR CA 125: CPT

## 2020-01-02 ENCOUNTER — TELEPHONE (OUTPATIENT)
Dept: ADMINISTRATIVE | Facility: OTHER | Age: 70
End: 2020-01-02

## 2020-01-02 NOTE — PROGRESS NOTES
Subjective:       Patient ID: Murlene Mary Haase is a 69 y.o. female.    Chief Complaint: primary peritoneal adenocarcinoma    HPI     Patient comes in today for follow up for recurrent ovarian cancer. She has a BRCA 2 mutation. She could not tolerate niraparib.     2020:  is 25.     She is worried about her . ?parkinson's disease. AAA.   She is anxious. Not sleeping well. States that remeron helped her with sleep when she was first diagnosed with primary peritoneal cancer. Asking if she could try this again. Taking xanax 2-3 times a day for anxiety.     MM2019: normal. See [facility]. refusing MMG. Only wants MRI  MRI: breast: 2019: normal. she only does MRI. Refused MMG. she will schedule for  in [place].   CBE: 2019     Colonoscopy: Aug 11, 2016: inflammation and diverticulosis  EGD: Aug 11, 2016: normal          Her oncologic history is:    2013: She was taken to the operating room on 2013 for preoperative diagnosis consistent with ovarian cancer. 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 At that time she underwent an omentectomy and bilateral salpingo-oophorectomy however she had bulky residual disease along the diaphragms as well as paracolic gutters and pelvis.    She was then treated with 3 cycles of dose dense paclitaxel and carboplatin. A CT scan obtained after this showed the following:    Subcentimeter hepatic lesions, too small to accurately characterize.    Subtle, increased soft tissue attenuation along the periphery of the right hepatic lobe that is nonspecific but is favored to represent a prominent right hemidiaphragm. Peritoneal thickening is thought to be less likely but cannot be entirely excluded   and correlation with prior imaging studies is advised if available.     Large amount of stool throughout entire colon.    L1 vertebral body compression fracture of unknown  chronicity.   Small pericardial effusion.    She then received 2 more cycles of dose dense paclitaxel and carboplatin and then received one cycle of standard Taxol and carboplatin.     Dec 2013:  Her last chemotherapy was December 26, 2013.    At the completion of 6 cycles of postoperative Taxol and carboplatin, her  was 7 and her CT scan of the abdomen and pelvis showed no evidence for disease.        Feb 2016,  was 24.May 2016  was 35. June 2016: 33.   May 2016: CT scan abdomen and pelvis was negative.   August 2016:  was 31.  Nov 2016: C A 125 36  Feb 2017:  56. She has had a mildly elevated  in the 30s with negative scanning.   Feb 2017:  was 56. CT scan showed possible intussusception and Pelvis:  2.0 x 0.9 cm soft tissue attenuating focus in the anterior mid pelvis that abuts bowel, and is present on arterial/delayed phases.      PET was done and shows adenopathy as well.   June 2018: Completed 6 cycles of taxol and carboplatin on 6/29/2017 for recurrent ovarian cancer.  Received chemotherapy with Dr. Tana Deutsch.  CT scan after completion of 6 cycles of taxol and carboplatin shows stable right pelvic lymph node index lesion measuring 0.7 cm with some prominent lymph nodes in the jessenia hepatis, unchanged from prior.  There is no new lymphadenopathy identified.   was 8.       Oct 2017:  She tried Niraparib but had a reaction to it on two occasions and stopped this. Started with 3 tablets day one and had SOB and chest pain. Waited several days and restarted with 1 tablet. Had the same symptoms and did not want to resume.       Patient with a BRCA 2 mutation.   No available tissue for STRATA testing as her surgery was in 2013.         Review of Systems   Constitutional: Negative for chills, fatigue and fever.   Eyes: Negative for visual disturbance.   Respiratory: Negative for cough, shortness of breath and wheezing.    Cardiovascular: Negative for chest pain,  "palpitations and leg swelling.   Gastrointestinal: Negative for abdominal distention, abdominal pain, constipation, diarrhea, nausea and vomiting.   Genitourinary: Negative for difficulty urinating, dysuria, frequency, genital sores, hematuria, pelvic pain, urgency, vaginal bleeding, vaginal discharge and vaginal pain.   Musculoskeletal: Negative for gait problem and neck stiffness.   Skin: Negative for rash.   Neurological: Negative for seizures and weakness.   Hematological: Negative for adenopathy. Does not bruise/bleed easily.   Psychiatric/Behavioral: The patient is not nervous/anxious.        Objective:   BP (!) 146/69   Pulse 78   Ht 5' 4" (1.626 m)   Wt 55.6 kg (122 lb 9.2 oz)   BMI 21.04 kg/m²      Physical Exam   Constitutional: She is oriented to person, place, and time. She appears well-developed and well-nourished.   HENT:   Head: Normocephalic and atraumatic.   Eyes: No scleral icterus.   Neck: Neck supple. No tracheal deviation present. No thyroid mass and no thyromegaly present.   Cardiovascular: Normal rate and regular rhythm.   Pulmonary/Chest: Effort normal and breath sounds normal. She has no wheezes. Right breast exhibits no inverted nipple, no mass, no nipple discharge, no skin change and no tenderness. Left breast exhibits no inverted nipple, no mass, no nipple discharge, no skin change and no tenderness.   Abdominal: Soft. She exhibits no distension and no mass. There is no hepatosplenomegaly. There is no tenderness. There is no rebound and no guarding.   Genitourinary:   Genitourinary Comments: Bimanual exam:  Vulva: no lesions. Normal appearance  Urethra: Normal size and location. No lesions  Bladder: No masses or tenderness.  Vagina: normal mucosa. No lesion  Cervix: absent.   Uterus: absent.  Adnexa: no masses.  Rectovaginal: Not performed     Musculoskeletal: She exhibits no edema or tenderness.   Lymphadenopathy:     She has no cervical adenopathy.     She has no axillary " adenopathy.        Right: No inguinal and no supraclavicular adenopathy present.        Left: No inguinal and no supraclavicular adenopathy present.   Neurological: She is alert and oriented to person, place, and time.   Skin: Skin is warm and dry.   Psychiatric: She has a normal mood and affect. Her behavior is normal. Judgment and thought content normal.       Assessment:       1. Primary peritoneal adenocarcinoma    2. BRCA2 gene mutation positive in female    3. Well woman exam with routine gynecological exam    4. Primary insomnia    5. Anxiety        Plan:   Primary peritoneal adenocarcinoma  She is anxious because  has increased. Tried to reassure her that it was normal. Would like repeat in 2 months to trend.      [facility]   RTC in 4 months      -     ; Future; Expected date: 05/04/2020  -     ; Future; Expected date: 03/03/2020    BRCA2 gene mutation positive in female  Did not tolerate niraparib  Well woman exam with routine gynecological exam    Primary insomnia  -     mirtazapine (REMERON SOL-TAB) 15 MG disintegrating tablet; Take 1 tablet (15 mg total) by mouth every evening.  Dispense: 90 tablet; Refill: 3    Anxiety  -     ALPRAZolam (XANAX) 0.5 MG tablet; Take 1 tablet (0.5 mg total) by mouth 3 (three) times daily as needed.  Dispense: 90 tablet; Refill: 1

## 2020-01-03 ENCOUNTER — OFFICE VISIT (OUTPATIENT)
Dept: GYNECOLOGIC ONCOLOGY | Facility: CLINIC | Age: 70
End: 2020-01-03
Payer: MEDICARE

## 2020-01-03 ENCOUNTER — LAB VISIT (OUTPATIENT)
Dept: LAB | Facility: HOSPITAL | Age: 70
End: 2020-01-03
Attending: OBSTETRICS & GYNECOLOGY
Payer: MEDICARE

## 2020-01-03 VITALS
HEART RATE: 78 BPM | DIASTOLIC BLOOD PRESSURE: 69 MMHG | SYSTOLIC BLOOD PRESSURE: 146 MMHG | HEIGHT: 64 IN | BODY MASS INDEX: 20.92 KG/M2 | WEIGHT: 122.56 LBS

## 2020-01-03 DIAGNOSIS — Z15.02 BRCA2 GENE MUTATION POSITIVE IN FEMALE: ICD-10-CM

## 2020-01-03 DIAGNOSIS — C48.2 PRIMARY PERITONEAL ADENOCARCINOMA: ICD-10-CM

## 2020-01-03 DIAGNOSIS — Z15.09 BRCA2 GENE MUTATION POSITIVE IN FEMALE: ICD-10-CM

## 2020-01-03 DIAGNOSIS — F41.9 ANXIETY: Chronic | ICD-10-CM

## 2020-01-03 DIAGNOSIS — C48.2 PRIMARY PERITONEAL ADENOCARCINOMA: Primary | ICD-10-CM

## 2020-01-03 DIAGNOSIS — Z15.01 BRCA2 GENE MUTATION POSITIVE IN FEMALE: ICD-10-CM

## 2020-01-03 DIAGNOSIS — Z01.419 WELL WOMAN EXAM WITH ROUTINE GYNECOLOGICAL EXAM: ICD-10-CM

## 2020-01-03 DIAGNOSIS — F51.01 PRIMARY INSOMNIA: ICD-10-CM

## 2020-01-03 LAB — CANCER AG125 SERPL-ACNC: 25 U/ML (ref 0–30)

## 2020-01-03 PROCEDURE — 99999 PR PBB SHADOW E&M-EST. PATIENT-LVL III: ICD-10-PCS | Mod: PBBFAC,,, | Performed by: OBSTETRICS & GYNECOLOGY

## 2020-01-03 PROCEDURE — 99999 PR PBB SHADOW E&M-EST. PATIENT-LVL III: CPT | Mod: PBBFAC,,, | Performed by: OBSTETRICS & GYNECOLOGY

## 2020-01-03 PROCEDURE — G0101 PR CA SCREEN;PELVIC/BREAST EXAM: ICD-10-PCS | Mod: S$PBB,,, | Performed by: OBSTETRICS & GYNECOLOGY

## 2020-01-03 PROCEDURE — 1159F MED LIST DOCD IN RCRD: CPT | Mod: ,,, | Performed by: OBSTETRICS & GYNECOLOGY

## 2020-01-03 PROCEDURE — 1125F PR PAIN SEVERITY QUANTIFIED, PAIN PRESENT: ICD-10-PCS | Mod: ,,, | Performed by: OBSTETRICS & GYNECOLOGY

## 2020-01-03 PROCEDURE — 86304 IMMUNOASSAY TUMOR CA 125: CPT

## 2020-01-03 PROCEDURE — 36415 COLL VENOUS BLD VENIPUNCTURE: CPT

## 2020-01-03 PROCEDURE — 1125F AMNT PAIN NOTED PAIN PRSNT: CPT | Mod: ,,, | Performed by: OBSTETRICS & GYNECOLOGY

## 2020-01-03 PROCEDURE — G0101 CA SCREEN;PELVIC/BREAST EXAM: HCPCS | Mod: S$PBB,,, | Performed by: OBSTETRICS & GYNECOLOGY

## 2020-01-03 PROCEDURE — 99213 OFFICE O/P EST LOW 20 MIN: CPT | Mod: PBBFAC | Performed by: OBSTETRICS & GYNECOLOGY

## 2020-01-03 PROCEDURE — 1159F PR MEDICATION LIST DOCUMENTED IN MEDICAL RECORD: ICD-10-PCS | Mod: ,,, | Performed by: OBSTETRICS & GYNECOLOGY

## 2020-01-03 RX ORDER — ALPRAZOLAM 0.5 MG/1
0.5 TABLET ORAL 3 TIMES DAILY PRN
Qty: 90 TABLET | Refills: 1
Start: 2020-01-03 | End: 2020-11-03 | Stop reason: SDUPTHER

## 2020-01-03 RX ORDER — MIRTAZAPINE 15 MG/1
15 TABLET, ORALLY DISINTEGRATING ORAL NIGHTLY
Qty: 90 TABLET | Refills: 3 | Status: SHIPPED | OUTPATIENT
Start: 2020-01-03 | End: 2021-04-09

## 2020-04-28 ENCOUNTER — TELEPHONE (OUTPATIENT)
Dept: ADMINISTRATIVE | Facility: OTHER | Age: 70
End: 2020-04-28

## 2020-05-26 NOTE — PROGRESS NOTES
Subjective:       Patient ID: Murlene Mary Haase is a 70 y.o. female.    Chief Complaint: Primary peritoneal adenocarcinoma (4mth f/u)    HPI   Patient comes in today for follow up for recurrent ovarian cancer. She has a BRCA 2 mutation. She could not tolerate niraparib.      May 2020:  is 36.      She is getting a divorce.      She is anxious.  Taking xanax 2-3 times a day for anxiety. Remeron has helped with sleep.      MM2019: normal. See [facility]. refusing MMG. Only wants MRI  MRI: breast: 2019: normal. She only does MRI. Refused MMG. she will schedule for  in [place].     CBE: 2019     Colonoscopy: Aug 11, 2016: inflammation and diverticulosis  EGD: Aug 11, 2016: normal          Her oncologic history is:    2013: She was taken to the operating room on 2013 for preoperative diagnosis consistent with ovarian cancer. 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 At that time she underwent an omentectomy and bilateral salpingo-oophorectomy however she had bulky residual disease along the diaphragms as well as paracolic gutters and pelvis.    She was then treated with 3 cycles of dose dense paclitaxel and carboplatin. A CT scan obtained after this showed the following:    Subcentimeter hepatic lesions, too small to accurately characterize.    Subtle, increased soft tissue attenuation along the periphery of the right hepatic lobe that is nonspecific but is favored to represent a prominent right hemidiaphragm. Peritoneal thickening is thought to be less likely but cannot be entirely excluded   and correlation with prior imaging studies is advised if available.     Large amount of stool throughout entire colon.    L1 vertebral body compression fracture of unknown chronicity.   Small pericardial effusion.    She then received 2 more cycles of dose dense paclitaxel and carboplatin and then received one cycle of  standard Taxol and carboplatin.     Dec 2013:  Her last chemotherapy was December 26, 2013.    At the completion of 6 cycles of postoperative Taxol and carboplatin, her  was 7 and her CT scan of the abdomen and pelvis showed no evidence for disease.        Feb 2016,  was 24.May 2016  was 35. June 2016: 33.   May 2016: CT scan abdomen and pelvis was negative.   August 2016:  was 31.  Nov 2016: C A 125 36  Feb 2017:  56. She has had a mildly elevated  in the 30s with negative scanning.   Feb 2017:  was 56. CT scan showed possible intussusception and Pelvis:  2.0 x 0.9 cm soft tissue attenuating focus in the anterior mid pelvis that abuts bowel, and is present on arterial/delayed phases.      PET was done and shows adenopathy as well.   June 2017: Completed 6 cycles of taxol and carboplatin on 6/29/2017 for recurrent ovarian cancer.  Received chemotherapy with Dr. Tana Deutsch.  CT scan after completion of 6 cycles of taxol and carboplatin shows stable right pelvic lymph node index lesion measuring 0.7 cm with some prominent lymph nodes in the jessenia hepatis, unchanged from prior.  There is no new lymphadenopathy identified.   was 8.       Oct 2017:  She tried Niraparib but had a reaction to it on two occasions and stopped this. Started with 3 tablets day one and had SOB and chest pain. Waited several days and restarted with 1 tablet. Had the same symptoms and did not want to resume.       Patient with a BRCA 2 mutation.   No available tissue for STRATA testing as her surgery was in 2013.          Review of Systems   Constitutional: Negative for chills, fatigue and fever.   Respiratory: Negative for cough, shortness of breath and wheezing.    Cardiovascular: Negative for chest pain, palpitations and leg swelling.   Gastrointestinal: Negative for abdominal pain, constipation, diarrhea, nausea and vomiting.   Genitourinary: Negative for difficulty urinating, dysuria,  frequency, genital sores, hematuria, urgency, vaginal bleeding, vaginal discharge and vaginal pain.   Neurological: Negative for weakness.   Hematological: Negative for adenopathy. Does not bruise/bleed easily.   Psychiatric/Behavioral: The patient is not nervous/anxious.        Objective:   BP (!) 182/84   Pulse 70   Wt 54 kg (119 lb)   BMI 20.43 kg/m²      Physical Exam   Constitutional: She is oriented to person, place, and time. She appears well-developed and well-nourished.   HENT:   Head: Normocephalic and atraumatic.   Eyes: No scleral icterus.   Neck: No tracheal deviation present. No thyroid mass and no thyromegaly present.   Cardiovascular: Normal rate and regular rhythm.   Pulmonary/Chest: Effort normal and breath sounds normal. She has no wheezes. Right breast exhibits no mass, no nipple discharge, no skin change and no tenderness. Left breast exhibits no mass, no nipple discharge, no skin change and no tenderness.   Abdominal: She exhibits no distension and no mass. There is no hepatosplenomegaly. There is no tenderness. There is no rebound and no guarding.   Genitourinary:   Genitourinary Comments: Bimanual exam:  Vulva: no lesions. Normal appearance  Urethra: Normal size and location. No lesions  Bladder: No masses or tenderness.  Vagina: normal mucosa. No lesion  Cervix: absent.   Uterus: absent.  Adnexa: no masses.  Rectovaginal: No posterior cul de sac thickening or nodularity.  Rectal: no masses. Nontender. Normal tone.      Musculoskeletal: She exhibits no edema or tenderness.   Lymphadenopathy:     She has no cervical adenopathy.     She has no axillary adenopathy.        Right: No inguinal and no supraclavicular adenopathy present.        Left: No inguinal and no supraclavicular adenopathy present.   Neurological: She is alert and oriented to person, place, and time.   Skin: Skin is warm and dry. No rash noted.   Psychiatric: She has a normal mood and affect. Her behavior is normal. Judgment  and thought content normal.       Assessment:       1. Primary peritoneal adenocarcinoma    2. BRCA2 gene mutation positive in female    3. Well woman exam with routine gynecological exam    4. Elevated cancer antigen 125 (CA-125)        Plan:   Primary peritoneal adenocarcinoma  Mildly elevated   Will repeat in 2 months at [facility].   If it has returned to normal then will follow up in Nov 2020.     -     ; Future; Expected date: 11/28/2020    BRCA2 gene mutation positive in female    Well woman exam with routine gynecological exam

## 2020-05-28 ENCOUNTER — LAB VISIT (OUTPATIENT)
Dept: LAB | Facility: HOSPITAL | Age: 70
End: 2020-05-28
Attending: OBSTETRICS & GYNECOLOGY
Payer: MEDICARE

## 2020-05-28 ENCOUNTER — OFFICE VISIT (OUTPATIENT)
Dept: GYNECOLOGIC ONCOLOGY | Facility: CLINIC | Age: 70
End: 2020-05-28
Payer: MEDICARE

## 2020-05-28 VITALS
DIASTOLIC BLOOD PRESSURE: 84 MMHG | BODY MASS INDEX: 20.43 KG/M2 | SYSTOLIC BLOOD PRESSURE: 182 MMHG | HEART RATE: 70 BPM | WEIGHT: 119 LBS

## 2020-05-28 DIAGNOSIS — Z15.02 BRCA2 GENE MUTATION POSITIVE IN FEMALE: ICD-10-CM

## 2020-05-28 DIAGNOSIS — Z15.01 BRCA2 GENE MUTATION POSITIVE IN FEMALE: ICD-10-CM

## 2020-05-28 DIAGNOSIS — C48.2 PRIMARY PERITONEAL ADENOCARCINOMA: ICD-10-CM

## 2020-05-28 DIAGNOSIS — Z01.419 WELL WOMAN EXAM WITH ROUTINE GYNECOLOGICAL EXAM: ICD-10-CM

## 2020-05-28 DIAGNOSIS — R97.1 ELEVATED CANCER ANTIGEN 125 (CA-125): ICD-10-CM

## 2020-05-28 DIAGNOSIS — Z15.09 BRCA2 GENE MUTATION POSITIVE IN FEMALE: ICD-10-CM

## 2020-05-28 DIAGNOSIS — C48.2 PRIMARY PERITONEAL ADENOCARCINOMA: Primary | ICD-10-CM

## 2020-05-28 LAB — CANCER AG125 SERPL-ACNC: 36 U/ML (ref 0–30)

## 2020-05-28 PROCEDURE — 99214 PR OFFICE/OUTPT VISIT, EST, LEVL IV, 30-39 MIN: ICD-10-PCS | Mod: S$PBB,,, | Performed by: OBSTETRICS & GYNECOLOGY

## 2020-05-28 PROCEDURE — 36415 COLL VENOUS BLD VENIPUNCTURE: CPT

## 2020-05-28 PROCEDURE — 86304 IMMUNOASSAY TUMOR CA 125: CPT

## 2020-05-28 PROCEDURE — 99999 PR PBB SHADOW E&M-EST. PATIENT-LVL II: ICD-10-PCS | Mod: PBBFAC,,, | Performed by: OBSTETRICS & GYNECOLOGY

## 2020-05-28 PROCEDURE — 99212 OFFICE O/P EST SF 10 MIN: CPT | Mod: PBBFAC | Performed by: OBSTETRICS & GYNECOLOGY

## 2020-05-28 PROCEDURE — 99999 PR PBB SHADOW E&M-EST. PATIENT-LVL II: CPT | Mod: PBBFAC,,, | Performed by: OBSTETRICS & GYNECOLOGY

## 2020-05-28 PROCEDURE — 99214 OFFICE O/P EST MOD 30 MIN: CPT | Mod: S$PBB,,, | Performed by: OBSTETRICS & GYNECOLOGY

## 2020-07-29 ENCOUNTER — TELEPHONE (OUTPATIENT)
Dept: GYNECOLOGIC ONCOLOGY | Facility: CLINIC | Age: 70
End: 2020-07-29

## 2020-07-29 DIAGNOSIS — C48.2 PRIMARY PERITONEAL ADENOCARCINOMA: Primary | ICD-10-CM

## 2020-07-29 NOTE — TELEPHONE ENCOUNTER
----- Message from Alisha Ramos sent at 7/29/2020 12:15 PM CDT -----  Regarding: Returning Staff Call  Name of Who is Calling : HAASE, MURLENE TRINI [[id number]]    Patient is returning a call from staff   .....Please contact to further discuss and advise.    Can the clinic reply by [facility] : No    What Number to Call Back :  [phone]

## 2020-10-29 ENCOUNTER — LAB VISIT (OUTPATIENT)
Dept: LAB | Facility: HOSPITAL | Age: 70
End: 2020-10-29
Attending: OBSTETRICS & GYNECOLOGY
Payer: MEDICARE

## 2020-10-29 DIAGNOSIS — C48.2 PRIMARY PERITONEAL ADENOCARCINOMA: ICD-10-CM

## 2020-10-29 LAB — CANCER AG125 SERPL-ACNC: 45 U/ML (ref 0–30)

## 2020-10-29 PROCEDURE — 36415 COLL VENOUS BLD VENIPUNCTURE: CPT

## 2020-10-29 PROCEDURE — 86304 IMMUNOASSAY TUMOR CA 125: CPT

## 2020-11-02 DIAGNOSIS — C48.2 PRIMARY PERITONEAL ADENOCARCINOMA: ICD-10-CM

## 2020-11-02 DIAGNOSIS — R97.1 ELEVATED CANCER ANTIGEN 125 (CA-125): Primary | ICD-10-CM

## 2020-11-03 ENCOUNTER — OFFICE VISIT (OUTPATIENT)
Dept: GYNECOLOGIC ONCOLOGY | Facility: CLINIC | Age: 70
End: 2020-11-03
Payer: MEDICARE

## 2020-11-03 ENCOUNTER — HOSPITAL ENCOUNTER (OUTPATIENT)
Dept: RADIOLOGY | Facility: HOSPITAL | Age: 70
Discharge: HOME | End: 2020-11-03
Attending: OBSTETRICS & GYNECOLOGY
Payer: MEDICARE

## 2020-11-03 VITALS
SYSTOLIC BLOOD PRESSURE: 171 MMHG | BODY MASS INDEX: 21.46 KG/M2 | DIASTOLIC BLOOD PRESSURE: 79 MMHG | HEART RATE: 86 BPM | WEIGHT: 125 LBS

## 2020-11-03 DIAGNOSIS — Z15.09 BRCA2 GENE MUTATION POSITIVE IN FEMALE: ICD-10-CM

## 2020-11-03 DIAGNOSIS — C48.2 PRIMARY PERITONEAL ADENOCARCINOMA: Primary | ICD-10-CM

## 2020-11-03 DIAGNOSIS — R97.1 ELEVATED CANCER ANTIGEN 125 (CA-125): ICD-10-CM

## 2020-11-03 DIAGNOSIS — Z15.01 BRCA2 GENE MUTATION POSITIVE IN FEMALE: ICD-10-CM

## 2020-11-03 DIAGNOSIS — F41.9 ANXIETY: Chronic | ICD-10-CM

## 2020-11-03 DIAGNOSIS — Z15.02 BRCA2 GENE MUTATION POSITIVE IN FEMALE: ICD-10-CM

## 2020-11-03 LAB
CREAT SERPL-MCNC: 0.6 MG/DL (ref 0.5–1.4)
SAMPLE: NORMAL

## 2020-11-03 PROCEDURE — 99213 OFFICE O/P EST LOW 20 MIN: CPT | Mod: PBBFAC,25 | Performed by: OBSTETRICS & GYNECOLOGY

## 2020-11-03 PROCEDURE — 74177 CT ABDOMEN PELVIS WITH CONTRAST: ICD-10-PCS | Mod: 26,,, | Performed by: RADIOLOGY

## 2020-11-03 PROCEDURE — 99999 PR PBB SHADOW E&M-EST. PATIENT-LVL III: ICD-10-PCS | Mod: PBBFAC,,, | Performed by: OBSTETRICS & GYNECOLOGY

## 2020-11-03 PROCEDURE — 99999 PR PBB SHADOW E&M-EST. PATIENT-LVL III: CPT | Mod: PBBFAC,,, | Performed by: OBSTETRICS & GYNECOLOGY

## 2020-11-03 PROCEDURE — 74177 CT ABD & PELVIS W/CONTRAST: CPT | Mod: 26,,, | Performed by: RADIOLOGY

## 2020-11-03 PROCEDURE — 74177 CT ABD & PELVIS W/CONTRAST: CPT | Mod: TC

## 2020-11-03 PROCEDURE — 25500020 PHARM REV CODE 255: Performed by: OBSTETRICS & GYNECOLOGY

## 2020-11-03 PROCEDURE — 99214 OFFICE O/P EST MOD 30 MIN: CPT | Mod: S$PBB,,, | Performed by: OBSTETRICS & GYNECOLOGY

## 2020-11-03 PROCEDURE — 99214 PR OFFICE/OUTPT VISIT, EST, LEVL IV, 30-39 MIN: ICD-10-PCS | Mod: S$PBB,,, | Performed by: OBSTETRICS & GYNECOLOGY

## 2020-11-03 RX ORDER — ALPRAZOLAM 0.5 MG/1
0.5 TABLET ORAL 3 TIMES DAILY PRN
Qty: 90 TABLET | Refills: 2 | Status: SHIPPED | OUTPATIENT
Start: 2020-11-03 | End: 2021-01-12 | Stop reason: SDUPTHER

## 2020-11-03 RX ADMIN — IOHEXOL 15 ML: 350 INJECTION, SOLUTION INTRAVENOUS at 10:11

## 2020-11-03 RX ADMIN — IOHEXOL 75 ML: 350 INJECTION, SOLUTION INTRAVENOUS at 10:11

## 2020-11-03 NOTE — PROGRESS NOTES
Subjective:       Patient ID: Murlene Mary Haase is a 70 y.o. female.    Chief Complaint: Primary peritoneal adenocarcinoma    HPI     Patient comes in today for review of CT scan done for rising . She has a history of primary peritoneal carcinoma. She has a BRCA 2 mutation. She could not tolerate niraparib after she had a recurrence in 2017.      May 2020:  is 36.   2020: : 45. CT scan AP: 2.5 cm soft tissue mass in the jessenia hepatis does abut the pancreas though favored to be an enlarged node.  There is an additional enlarged node in the right hemipelvis.  Findings concerning for metastatic disease.     She is anxious.  Taking xanax 2-3 times a day for anxiety. Remeron has helped with sleep.      MM2019: normal. See [facility]. refusing MMG. Only wants MRI  MRI: breast: 2019: normal. She only does MRI. Refused MMG. She will schedule for  in [place].     CBE: 2019     Colonoscopy: Aug 11, 2016: inflammation and diverticulosis  EGD: Aug 11, 2016: normal          Her oncologic history is:    2013: She was taken to the operating room on 2013 for preoperative diagnosis consistent with ovarian cancer. 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 At that time she underwent an omentectomy and bilateral salpingo-oophorectomy however she had bulky residual disease along the diaphragms as well as paracolic gutters and pelvis.    She was then treated with 3 cycles of dose dense paclitaxel and carboplatin. A CT scan obtained after this showed the following:    Subcentimeter hepatic lesions, too small to accurately characterize.    Subtle, increased soft tissue attenuation along the periphery of the right hepatic lobe that is nonspecific but is favored to represent a prominent right hemidiaphragm. Peritoneal thickening is thought to be less likely but cannot be entirely excluded   and correlation with  prior imaging studies is advised if available.     Large amount of stool throughout entire colon.    L1 vertebral body compression fracture of unknown chronicity.   Small pericardial effusion.    She then received 2 more cycles of dose dense paclitaxel and carboplatin and then received one cycle of standard Taxol and carboplatin.     Dec 2013:  Her last chemotherapy was December 26, 2013.    At the completion of 6 cycles of postoperative Taxol and carboplatin, her  was 7 and her CT scan of the abdomen and pelvis showed no evidence for disease.        Feb 2016,  was 24.May 2016  was 35. June 2016: 33.   May 2016: CT scan abdomen and pelvis was negative.   August 2016:  was 31.  Nov 2016: C A 125 36  Feb 2017:  56. She has had a mildly elevated  in the 30s with negative scanning.   Feb 2017:  was 56. CT scan showed possible intussusception and Pelvis:  2.0 x 0.9 cm soft tissue attenuating focus in the anterior mid pelvis that abuts bowel, and is present on arterial/delayed phases.      PET was done and shows adenopathy as well.   June 2017: Completed 6 cycles of taxol and carboplatin on 6/29/2017 for recurrent ovarian cancer.  Received chemotherapy with Dr. Tana Deutsch.  CT scan after completion of 6 cycles of taxol and carboplatin shows stable right pelvic lymph node index lesion measuring 0.7 cm with some prominent lymph nodes in the jessenia hepatis, unchanged from prior.  There is no new lymphadenopathy identified.   was 8.       Oct 2017:  She tried Niraparib but had a reaction to it on two occasions and stopped this. Started with 3 tablets day one and had SOB and chest pain. Waited several days and restarted with 1 tablet. Had the same symptoms and did not want to resume.       Patient with a BRCA 2 mutation.   No available tissue for STRATA testing as her surgery was in 2013.          Review of Systems   Constitutional: Negative for chills, fatigue and fever.    Respiratory: Negative for cough, shortness of breath and wheezing.    Cardiovascular: Negative for chest pain, palpitations and leg swelling.   Gastrointestinal: Negative for abdominal pain, constipation, diarrhea, nausea and vomiting.   Genitourinary: Negative for difficulty urinating, dysuria, frequency, genital sores, hematuria, urgency, vaginal bleeding, vaginal discharge and vaginal pain.   Neurological: Negative for weakness.   Hematological: Negative for adenopathy. Does not bruise/bleed easily.   Psychiatric/Behavioral: The patient is not nervous/anxious.        Objective:   BP (!) 171/79   Pulse 86   Wt 56.7 kg (125 lb)   BMI 21.46 kg/m²      Physical Exam  Constitutional:       Appearance: She is well-developed.   HENT:      Head: Normocephalic and atraumatic.   Eyes:      General: No scleral icterus.  Neck:      Musculoskeletal: Neck supple.      Thyroid: No thyroid mass or thyromegaly.      Trachea: No tracheal deviation.   Cardiovascular:      Rate and Rhythm: Normal rate and regular rhythm.   Pulmonary:      Effort: Pulmonary effort is normal.      Breath sounds: Normal breath sounds. No wheezing.   Abdominal:      General: There is no distension.      Palpations: Abdomen is soft. There is no mass.      Tenderness: There is no abdominal tenderness. There is no guarding or rebound.   Genitourinary:     Comments: Bimanual exam:  Vulva: no lesions. Normal appearance  Urethra: Normal size and location. No lesions  Bladder: No masses or tenderness.  Vagina: normal mucosa. No lesion  Cervix: absent.   Uterus: absent.  Adnexa: no masses.  Rectovaginal: Not performed    Musculoskeletal:         General: No tenderness.   Lymphadenopathy:      Cervical: No cervical adenopathy.      Upper Body:      Right upper body: No supraclavicular adenopathy.      Left upper body: No supraclavicular adenopathy.   Skin:     General: Skin is warm and dry.   Neurological:      Mental Status: She is alert and oriented to  person, place, and time.   Psychiatric:         Behavior: Behavior normal.         Thought Content: Thought content normal.         Judgment: Judgment normal.         Assessment:       1. Primary peritoneal adenocarcinoma    2. BRCA2 gene mutation positive in female    3. Elevated cancer antigen 125 (CA-125)    4. Anxiety        Plan:   Primary peritoneal adenocarcinoma  Recurrent carcinoma  Discussed resuming chemotherapy with taxol and carboplatin. Will also add Avastin   She wants treatment with Dr[facility] Tana Deutsch at [facility].   I called Dr. Deutsch and discussed plan for T/C/A. .   Will plan for 3 cycles and then return with imaging and .  She is understandable upset. I reviewed the plan with her and her brother.     BRCA2 gene mutation positive in female  Did not tolerate niraparib in the past.   Elevated cancer antigen 125 (CA-125)    Anxiety  -     ALPRAZolam (XANAX) 0.5 MG tablet; Take 1 tablet (0.5 mg total) by mouth 3 (three) times daily as needed.  Dispense: 90 tablet; Refill: 2

## 2020-11-13 ENCOUNTER — TELEPHONE (OUTPATIENT)
Dept: GYNECOLOGIC ONCOLOGY | Facility: CLINIC | Age: 70
End: 2020-11-13

## 2020-11-13 NOTE — TELEPHONE ENCOUNTER
----- Message from Kiersten Henry sent at 11/13/2020 10:26 AM CST -----  Regarding: medication  Name of Who is Calling: deon from [facility] pharmacy      What is the request in detail: Patient is requesting a call back she states the patient already gets ALPRAZolam (XANAX) 0.5 MG tablet from her pcp doctor       Can the clinic reply by [facility]: no      What Number to Call Back if not in [facility]: [phone]

## 2020-12-31 ENCOUNTER — TELEPHONE (OUTPATIENT)
Dept: GYNECOLOGIC ONCOLOGY | Facility: CLINIC | Age: 70
End: 2020-12-31

## 2020-12-31 DIAGNOSIS — C48.2 PRIMARY PERITONEAL ADENOCARCINOMA: Primary | ICD-10-CM

## 2020-12-31 NOTE — TELEPHONE ENCOUNTER
C3 12/30/2020. Received taxol but had allergic reaction to carboplatin. Stopped caboplatin and given steroids, benadryl and O2.     Discussed with patient

## 2020-12-31 NOTE — TELEPHONE ENCOUNTER
----- Message from Jacklyn Nieves sent at 12/31/2020  8:07 AM CST -----  Pt would like to be called back regarding  chemo reaction     Pt can be reached at  [phone]

## 2020-12-31 NOTE — TELEPHONE ENCOUNTER
Per patient's daughter, she took all of the taxol but had a severe reaction to carboplatin. Reaction - felt like her heart was racing, pain throughout body, and felt like she was getting a fever. Administered medication, pt daughter unable to recall what the medication was. They were not able to give her the carboplatin. Asked if we could reach out to her other chemo doctor (Dr. Ferguson at [facility]) to come up with a different plan for chemo. Patient's daughter stated that she only had one more cycle left. Patient's daughter had contact number [phone] for the cancer center but not a direct number for provider.    Telephone note. I called and spoke with the patient.

## 2021-01-12 ENCOUNTER — OFFICE VISIT (OUTPATIENT)
Dept: GYNECOLOGIC ONCOLOGY | Facility: CLINIC | Age: 71
End: 2021-01-12
Payer: MEDICARE

## 2021-01-12 ENCOUNTER — HOSPITAL ENCOUNTER (OUTPATIENT)
Dept: RADIOLOGY | Facility: HOSPITAL | Age: 71
Discharge: HOME | End: 2021-01-12
Attending: OBSTETRICS & GYNECOLOGY
Payer: MEDICARE

## 2021-01-12 VITALS
SYSTOLIC BLOOD PRESSURE: 165 MMHG | HEART RATE: 91 BPM | BODY MASS INDEX: 21.46 KG/M2 | DIASTOLIC BLOOD PRESSURE: 77 MMHG | WEIGHT: 125 LBS

## 2021-01-12 DIAGNOSIS — Z15.02 BRCA2 GENE MUTATION POSITIVE IN FEMALE: ICD-10-CM

## 2021-01-12 DIAGNOSIS — F41.9 ANXIETY: Chronic | ICD-10-CM

## 2021-01-12 DIAGNOSIS — C48.2 PRIMARY PERITONEAL ADENOCARCINOMA: Primary | ICD-10-CM

## 2021-01-12 DIAGNOSIS — Z15.01 BRCA2 GENE MUTATION POSITIVE IN FEMALE: ICD-10-CM

## 2021-01-12 DIAGNOSIS — C48.2 PRIMARY PERITONEAL ADENOCARCINOMA: ICD-10-CM

## 2021-01-12 DIAGNOSIS — Z15.09 BRCA2 GENE MUTATION POSITIVE IN FEMALE: ICD-10-CM

## 2021-01-12 DIAGNOSIS — Z51.11 CHEMOTHERAPY MANAGEMENT, ENCOUNTER FOR: ICD-10-CM

## 2021-01-12 LAB
CREAT SERPL-MCNC: 0.6 MG/DL (ref 0.5–1.4)
SAMPLE: NORMAL

## 2021-01-12 PROCEDURE — 99999 PR PBB SHADOW E&M-EST. PATIENT-LVL III: CPT | Mod: PBBFAC,,, | Performed by: OBSTETRICS & GYNECOLOGY

## 2021-01-12 PROCEDURE — 99999 PR PBB SHADOW E&M-EST. PATIENT-LVL III: ICD-10-PCS | Mod: PBBFAC,,, | Performed by: OBSTETRICS & GYNECOLOGY

## 2021-01-12 PROCEDURE — 74177 CT ABD & PELVIS W/CONTRAST: CPT | Mod: 26,,, | Performed by: RADIOLOGY

## 2021-01-12 PROCEDURE — 74177 CT ABDOMEN PELVIS WITH CONTRAST: ICD-10-PCS | Mod: 26,,, | Performed by: RADIOLOGY

## 2021-01-12 PROCEDURE — 25500020 PHARM REV CODE 255: Performed by: OBSTETRICS & GYNECOLOGY

## 2021-01-12 PROCEDURE — 74177 CT ABD & PELVIS W/CONTRAST: CPT | Mod: TC

## 2021-01-12 PROCEDURE — 99214 OFFICE O/P EST MOD 30 MIN: CPT | Mod: S$PBB,,, | Performed by: OBSTETRICS & GYNECOLOGY

## 2021-01-12 PROCEDURE — 99214 PR OFFICE/OUTPT VISIT, EST, LEVL IV, 30-39 MIN: ICD-10-PCS | Mod: S$PBB,,, | Performed by: OBSTETRICS & GYNECOLOGY

## 2021-01-12 PROCEDURE — 99213 OFFICE O/P EST LOW 20 MIN: CPT | Mod: PBBFAC,25 | Performed by: OBSTETRICS & GYNECOLOGY

## 2021-01-12 RX ORDER — ONDANSETRON 4 MG/1
TABLET, FILM COATED ORAL
COMMUNITY
Start: 2020-11-11 | End: 2021-09-28

## 2021-01-12 RX ORDER — ALPRAZOLAM 0.5 MG/1
0.5 TABLET ORAL 3 TIMES DAILY PRN
Qty: 90 TABLET | Refills: 2 | Status: SHIPPED | OUTPATIENT
Start: 2021-01-12 | End: 2021-04-16

## 2021-01-12 RX ADMIN — IOHEXOL 75 ML: 350 INJECTION, SOLUTION INTRAVENOUS at 01:01

## 2021-01-12 RX ADMIN — IOHEXOL 15 ML: 350 INJECTION, SOLUTION INTRAVENOUS at 12:01

## 2021-03-25 ENCOUNTER — HOSPITAL ENCOUNTER (OUTPATIENT)
Dept: RADIOLOGY | Facility: HOSPITAL | Age: 71
Discharge: HOME | End: 2021-03-25
Attending: OBSTETRICS & GYNECOLOGY
Payer: MEDICARE

## 2021-03-25 ENCOUNTER — OFFICE VISIT (OUTPATIENT)
Dept: GYNECOLOGIC ONCOLOGY | Facility: CLINIC | Age: 71
End: 2021-03-25
Payer: MEDICARE

## 2021-03-25 VITALS
BODY MASS INDEX: 21.91 KG/M2 | SYSTOLIC BLOOD PRESSURE: 118 MMHG | WEIGHT: 127.63 LBS | HEART RATE: 83 BPM | DIASTOLIC BLOOD PRESSURE: 67 MMHG

## 2021-03-25 DIAGNOSIS — C48.2 PRIMARY PERITONEAL ADENOCARCINOMA: Primary | ICD-10-CM

## 2021-03-25 DIAGNOSIS — Z15.09 BRCA2 GENE MUTATION POSITIVE IN FEMALE: ICD-10-CM

## 2021-03-25 DIAGNOSIS — C48.2 PRIMARY PERITONEAL ADENOCARCINOMA: ICD-10-CM

## 2021-03-25 DIAGNOSIS — Z15.02 BRCA2 GENE MUTATION POSITIVE IN FEMALE: ICD-10-CM

## 2021-03-25 DIAGNOSIS — R97.1 ELEVATED CANCER ANTIGEN 125 (CA-125): ICD-10-CM

## 2021-03-25 DIAGNOSIS — Z15.01 BRCA2 GENE MUTATION POSITIVE IN FEMALE: ICD-10-CM

## 2021-03-25 PROCEDURE — 74177 CT ABD & PELVIS W/CONTRAST: CPT | Mod: 26,,, | Performed by: RADIOLOGY

## 2021-03-25 PROCEDURE — 99213 OFFICE O/P EST LOW 20 MIN: CPT | Mod: PBBFAC,25 | Performed by: OBSTETRICS & GYNECOLOGY

## 2021-03-25 PROCEDURE — 99999 PR PBB SHADOW E&M-EST. PATIENT-LVL III: ICD-10-PCS | Mod: PBBFAC,,, | Performed by: OBSTETRICS & GYNECOLOGY

## 2021-03-25 PROCEDURE — 74177 CT ABD & PELVIS W/CONTRAST: CPT | Mod: TC

## 2021-03-25 PROCEDURE — 99215 PR OFFICE/OUTPT VISIT, EST, LEVL V, 40-54 MIN: ICD-10-PCS | Mod: S$PBB,,, | Performed by: OBSTETRICS & GYNECOLOGY

## 2021-03-25 PROCEDURE — 99215 OFFICE O/P EST HI 40 MIN: CPT | Mod: S$PBB,,, | Performed by: OBSTETRICS & GYNECOLOGY

## 2021-03-25 PROCEDURE — 99999 PR PBB SHADOW E&M-EST. PATIENT-LVL III: CPT | Mod: PBBFAC,,, | Performed by: OBSTETRICS & GYNECOLOGY

## 2021-03-25 PROCEDURE — 74177 CT ABDOMEN PELVIS WITH CONTRAST: ICD-10-PCS | Mod: 26,,, | Performed by: RADIOLOGY

## 2021-03-25 PROCEDURE — 25500020 PHARM REV CODE 255: Performed by: OBSTETRICS & GYNECOLOGY

## 2021-03-25 RX ADMIN — IOHEXOL 75 ML: 350 INJECTION, SOLUTION INTRAVENOUS at 10:03

## 2021-03-26 LAB
CREAT SERPL-MCNC: 0.6 MG/DL (ref 0.5–1.4)
SAMPLE: NORMAL

## 2021-03-30 ENCOUNTER — SPECIALTY PHARMACY (OUTPATIENT)
Dept: PHARMACY | Facility: CLINIC | Age: 71
End: 2021-03-30

## 2021-04-07 ENCOUNTER — TELEPHONE (OUTPATIENT)
Dept: GYNECOLOGIC ONCOLOGY | Facility: CLINIC | Age: 71
End: 2021-04-07

## 2021-04-08 ENCOUNTER — PATIENT MESSAGE (OUTPATIENT)
Dept: PHARMACY | Facility: CLINIC | Age: 71
End: 2021-04-08

## 2021-04-08 ENCOUNTER — TELEPHONE (OUTPATIENT)
Dept: PHARMACY | Facility: CLINIC | Age: 71
End: 2021-04-08

## 2021-04-09 ENCOUNTER — SPECIALTY PHARMACY (OUTPATIENT)
Dept: PHARMACY | Facility: CLINIC | Age: 71
End: 2021-04-09

## 2021-04-09 DIAGNOSIS — C48.2 PRIMARY PERITONEAL ADENOCARCINOMA: Primary | ICD-10-CM

## 2021-04-13 ENCOUNTER — TELEPHONE (OUTPATIENT)
Dept: GYNECOLOGIC ONCOLOGY | Facility: CLINIC | Age: 71
End: 2021-04-13

## 2021-04-17 ENCOUNTER — SPECIALTY PHARMACY (OUTPATIENT)
Dept: PHARMACY | Facility: CLINIC | Age: 71
End: 2021-04-17

## 2021-04-20 ENCOUNTER — TELEPHONE (OUTPATIENT)
Dept: GYNECOLOGIC ONCOLOGY | Facility: CLINIC | Age: 71
End: 2021-04-20

## 2021-04-22 ENCOUNTER — SPECIALTY PHARMACY (OUTPATIENT)
Dept: PHARMACY | Facility: CLINIC | Age: 71
End: 2021-04-22

## 2021-04-22 DIAGNOSIS — C48.2 PRIMARY PERITONEAL ADENOCARCINOMA: Primary | ICD-10-CM

## 2021-04-30 ENCOUNTER — TELEPHONE (OUTPATIENT)
Dept: PHARMACY | Facility: CLINIC | Age: 71
End: 2021-04-30

## 2021-05-04 ENCOUNTER — SPECIALTY PHARMACY (OUTPATIENT)
Dept: PHARMACY | Facility: CLINIC | Age: 71
End: 2021-05-04

## 2021-06-03 ENCOUNTER — SPECIALTY PHARMACY (OUTPATIENT)
Dept: PHARMACY | Facility: CLINIC | Age: 71
End: 2021-06-03

## 2021-06-09 ENCOUNTER — PATIENT MESSAGE (OUTPATIENT)
Dept: PHARMACY | Facility: CLINIC | Age: 71
End: 2021-06-09

## 2021-06-14 ENCOUNTER — SPECIALTY PHARMACY (OUTPATIENT)
Dept: PHARMACY | Facility: CLINIC | Age: 71
End: 2021-06-14

## 2021-06-14 DIAGNOSIS — C48.2 PRIMARY PERITONEAL ADENOCARCINOMA: Primary | ICD-10-CM

## 2021-06-16 ENCOUNTER — TELEPHONE (OUTPATIENT)
Dept: GYNECOLOGIC ONCOLOGY | Facility: CLINIC | Age: 71
End: 2021-06-16

## 2021-06-21 ENCOUNTER — TELEPHONE (OUTPATIENT)
Dept: GYNECOLOGIC ONCOLOGY | Facility: CLINIC | Age: 71
End: 2021-06-21

## 2021-06-25 ENCOUNTER — TELEPHONE (OUTPATIENT)
Dept: GYNECOLOGIC ONCOLOGY | Facility: CLINIC | Age: 71
End: 2021-06-25

## 2021-06-28 ENCOUNTER — TELEPHONE (OUTPATIENT)
Dept: GYNECOLOGIC ONCOLOGY | Facility: CLINIC | Age: 71
End: 2021-06-28

## 2021-06-29 ENCOUNTER — OFFICE VISIT (OUTPATIENT)
Dept: GYNECOLOGIC ONCOLOGY | Facility: CLINIC | Age: 71
End: 2021-06-29
Payer: MEDICARE

## 2021-06-29 ENCOUNTER — LAB VISIT (OUTPATIENT)
Dept: LAB | Facility: HOSPITAL | Age: 71
End: 2021-06-29
Attending: OBSTETRICS & GYNECOLOGY
Payer: MEDICARE

## 2021-06-29 VITALS
HEART RATE: 80 BPM | DIASTOLIC BLOOD PRESSURE: 72 MMHG | BODY MASS INDEX: 21.19 KG/M2 | SYSTOLIC BLOOD PRESSURE: 144 MMHG | WEIGHT: 123.44 LBS

## 2021-06-29 DIAGNOSIS — Z15.09 BRCA2 GENE MUTATION POSITIVE IN FEMALE: ICD-10-CM

## 2021-06-29 DIAGNOSIS — Z15.02 BRCA2 GENE MUTATION POSITIVE IN FEMALE: ICD-10-CM

## 2021-06-29 DIAGNOSIS — C48.2 PRIMARY PERITONEAL ADENOCARCINOMA: Primary | ICD-10-CM

## 2021-06-29 DIAGNOSIS — Z12.31 SCREENING MAMMOGRAM, ENCOUNTER FOR: ICD-10-CM

## 2021-06-29 DIAGNOSIS — G47.01 INSOMNIA DUE TO MEDICAL CONDITION: ICD-10-CM

## 2021-06-29 DIAGNOSIS — Z15.01 BRCA2 GENE MUTATION POSITIVE IN FEMALE: ICD-10-CM

## 2021-06-29 DIAGNOSIS — C48.2 PRIMARY PERITONEAL ADENOCARCINOMA: ICD-10-CM

## 2021-06-29 DIAGNOSIS — F41.9 ANXIETY: Chronic | ICD-10-CM

## 2021-06-29 LAB — CANCER AG125 SERPL-ACNC: 9 U/ML (ref 0–30)

## 2021-06-29 PROCEDURE — 99213 OFFICE O/P EST LOW 20 MIN: CPT | Mod: PBBFAC | Performed by: OBSTETRICS & GYNECOLOGY

## 2021-06-29 PROCEDURE — 86304 IMMUNOASSAY TUMOR CA 125: CPT | Performed by: OBSTETRICS & GYNECOLOGY

## 2021-06-29 PROCEDURE — 99999 PR PBB SHADOW E&M-EST. PATIENT-LVL III: ICD-10-PCS | Mod: PBBFAC,,, | Performed by: OBSTETRICS & GYNECOLOGY

## 2021-06-29 PROCEDURE — 99214 OFFICE O/P EST MOD 30 MIN: CPT | Mod: S$PBB,,, | Performed by: OBSTETRICS & GYNECOLOGY

## 2021-06-29 PROCEDURE — 36415 COLL VENOUS BLD VENIPUNCTURE: CPT | Performed by: OBSTETRICS & GYNECOLOGY

## 2021-06-29 PROCEDURE — 99999 PR PBB SHADOW E&M-EST. PATIENT-LVL III: CPT | Mod: PBBFAC,,, | Performed by: OBSTETRICS & GYNECOLOGY

## 2021-06-29 PROCEDURE — 99214 PR OFFICE/OUTPT VISIT, EST, LEVL IV, 30-39 MIN: ICD-10-PCS | Mod: S$PBB,,, | Performed by: OBSTETRICS & GYNECOLOGY

## 2021-06-29 RX ORDER — BESIFLOXACIN 6 MG/ML
SUSPENSION OPHTHALMIC
COMMUNITY
Start: 2021-06-04 | End: 2021-09-28

## 2021-06-29 RX ORDER — TEMAZEPAM 7.5 MG/1
15 CAPSULE ORAL NIGHTLY PRN
Qty: 20 CAPSULE | Refills: 0 | Status: SHIPPED | OUTPATIENT
Start: 2021-06-29 | End: 2021-07-09

## 2021-06-29 RX ORDER — BUSPIRONE HYDROCHLORIDE 15 MG/1
15 TABLET ORAL 2 TIMES DAILY
COMMUNITY
Start: 2021-05-13 | End: 2021-09-28

## 2021-06-29 RX ORDER — MUPIROCIN 20 MG/G
OINTMENT TOPICAL 3 TIMES DAILY
COMMUNITY
Start: 2021-05-19 | End: 2021-09-28

## 2021-06-29 RX ORDER — POLYMYXIN B SULFATE AND TRIMETHOPRIM 1; 10000 MG/ML; [USP'U]/ML
SOLUTION OPHTHALMIC
COMMUNITY
Start: 2021-06-26 | End: 2021-09-28

## 2021-06-29 RX ORDER — TOBRAMYCIN / DEXAMETHASONE 3; .5 MG/ML; MG/ML
1 SUSPENSION/ DROPS OPHTHALMIC 4 TIMES DAILY
COMMUNITY
Start: 2021-05-14 | End: 2021-09-28

## 2021-06-29 RX ORDER — OXYBUTYNIN CHLORIDE 5 MG/1
5 TABLET, EXTENDED RELEASE ORAL DAILY
COMMUNITY
Start: 2021-04-21 | End: 2021-09-28

## 2021-06-29 RX ORDER — CIPROFLOXACIN HYDROCHLORIDE 3 MG/G
OINTMENT OPHTHALMIC
COMMUNITY
Start: 2021-06-18 | End: 2021-09-28

## 2021-06-30 ENCOUNTER — TELEPHONE (OUTPATIENT)
Dept: GYNECOLOGIC ONCOLOGY | Facility: CLINIC | Age: 71
End: 2021-06-30

## 2021-06-30 DIAGNOSIS — Z12.31 OTHER SCREENING MAMMOGRAM: Primary | ICD-10-CM

## 2021-07-02 ENCOUNTER — TELEPHONE (OUTPATIENT)
Dept: PHARMACY | Facility: CLINIC | Age: 71
End: 2021-07-02

## 2021-07-07 ENCOUNTER — TELEPHONE (OUTPATIENT)
Dept: GYNECOLOGIC ONCOLOGY | Facility: CLINIC | Age: 71
End: 2021-07-07

## 2021-07-08 ENCOUNTER — SPECIALTY PHARMACY (OUTPATIENT)
Dept: PHARMACY | Facility: CLINIC | Age: 71
End: 2021-07-08

## 2021-07-08 ENCOUNTER — TELEPHONE (OUTPATIENT)
Dept: GYNECOLOGIC ONCOLOGY | Facility: CLINIC | Age: 71
End: 2021-07-08
Payer: MEDICARE

## 2021-07-08 DIAGNOSIS — C48.2 PRIMARY PERITONEAL ADENOCARCINOMA: Primary | ICD-10-CM

## 2021-07-08 NOTE — TELEPHONE ENCOUNTER
----- Message from Marcelina Garcia sent at 7/8/2021  1:45 PM CDT -----  Regarding: Insurance call back  Who called: Ferdinand from [facility]     What is the request in detail: Patient is requesting a call back. They state they have received the fax for the PA, but some of the information is missing and they would like to clarify that information. They state the medication is covered, but the quantity is not. They would like to know if the lower dose failed or if a higher dose is insufficient. They also need to know if the patient has previously tried and failed and for some other reason was unable to use doxepin The case number is [id number]  They would like to further discuss.   Please advise.    Can the clinic reply by [facility]? No    Best call back number: [phone]     Additional Information: N/A

## 2021-07-09 ENCOUNTER — TELEPHONE (OUTPATIENT)
Dept: GYNECOLOGIC ONCOLOGY | Facility: CLINIC | Age: 71
End: 2021-07-09

## 2021-07-09 DIAGNOSIS — G47.01 INSOMNIA DUE TO MEDICAL CONDITION: ICD-10-CM

## 2021-07-09 RX ORDER — TEMAZEPAM 15 MG/1
15 CAPSULE ORAL NIGHTLY PRN
Qty: 30 CAPSULE | Refills: 1 | Status: SHIPPED | OUTPATIENT
Start: 2021-07-09 | End: 2021-09-20

## 2021-07-14 ENCOUNTER — SPECIALTY PHARMACY (OUTPATIENT)
Dept: PHARMACY | Facility: CLINIC | Age: 71
End: 2021-07-14

## 2021-07-20 DIAGNOSIS — F41.9 ANXIETY: Chronic | ICD-10-CM

## 2021-07-20 RX ORDER — ALPRAZOLAM 0.5 MG/1
TABLET ORAL
Qty: 90 TABLET | Refills: 0 | Status: SHIPPED | OUTPATIENT
Start: 2021-07-20 | End: 2021-08-20

## 2021-08-12 DIAGNOSIS — Z15.01 BRCA2 GENE MUTATION POSITIVE IN FEMALE: ICD-10-CM

## 2021-08-12 DIAGNOSIS — C48.2 PRIMARY PERITONEAL ADENOCARCINOMA: ICD-10-CM

## 2021-08-12 DIAGNOSIS — Z15.09 BRCA2 GENE MUTATION POSITIVE IN FEMALE: ICD-10-CM

## 2021-08-12 DIAGNOSIS — Z15.02 BRCA2 GENE MUTATION POSITIVE IN FEMALE: ICD-10-CM

## 2021-08-12 RX ORDER — NIRAPARIB 100 MG/1
200 CAPSULE ORAL DAILY
Qty: 60 CAPSULE | Refills: 3 | Status: CANCELLED | OUTPATIENT
Start: 2021-08-12

## 2021-08-16 DIAGNOSIS — Z15.02 BRCA2 GENE MUTATION POSITIVE IN FEMALE: ICD-10-CM

## 2021-08-16 DIAGNOSIS — C48.2 PRIMARY PERITONEAL ADENOCARCINOMA: ICD-10-CM

## 2021-08-16 DIAGNOSIS — Z15.09 BRCA2 GENE MUTATION POSITIVE IN FEMALE: ICD-10-CM

## 2021-08-16 DIAGNOSIS — Z15.01 BRCA2 GENE MUTATION POSITIVE IN FEMALE: ICD-10-CM

## 2021-08-17 ENCOUNTER — SPECIALTY PHARMACY (OUTPATIENT)
Dept: PHARMACY | Facility: CLINIC | Age: 71
End: 2021-08-17

## 2021-08-18 ENCOUNTER — SPECIALTY PHARMACY (OUTPATIENT)
Dept: PHARMACY | Facility: CLINIC | Age: 71
End: 2021-08-18

## 2021-08-23 ENCOUNTER — TELEPHONE (OUTPATIENT)
Dept: GYNECOLOGIC ONCOLOGY | Facility: CLINIC | Age: 71
End: 2021-08-23

## 2021-09-18 ENCOUNTER — SPECIALTY PHARMACY (OUTPATIENT)
Dept: PHARMACY | Facility: CLINIC | Age: 71
End: 2021-09-18

## 2021-09-18 ENCOUNTER — PATIENT MESSAGE (OUTPATIENT)
Dept: PHARMACY | Facility: CLINIC | Age: 71
End: 2021-09-18

## 2021-09-21 ENCOUNTER — TELEPHONE (OUTPATIENT)
Dept: GYNECOLOGIC ONCOLOGY | Facility: CLINIC | Age: 71
End: 2021-09-21

## 2021-09-21 ENCOUNTER — PATIENT MESSAGE (OUTPATIENT)
Dept: GYNECOLOGIC ONCOLOGY | Facility: CLINIC | Age: 71
End: 2021-09-21

## 2021-09-22 ENCOUNTER — TELEPHONE (OUTPATIENT)
Dept: GYNECOLOGIC ONCOLOGY | Facility: CLINIC | Age: 71
End: 2021-09-22

## 2021-09-22 ENCOUNTER — PATIENT MESSAGE (OUTPATIENT)
Dept: GYNECOLOGIC ONCOLOGY | Facility: CLINIC | Age: 71
End: 2021-09-22

## 2021-09-24 ENCOUNTER — TELEPHONE (OUTPATIENT)
Dept: GYNECOLOGIC ONCOLOGY | Facility: CLINIC | Age: 71
End: 2021-09-24

## 2021-09-27 ENCOUNTER — LAB VISIT (OUTPATIENT)
Dept: LAB | Facility: HOSPITAL | Age: 71
End: 2021-09-27
Attending: OBSTETRICS & GYNECOLOGY
Payer: MEDICARE

## 2021-09-27 DIAGNOSIS — C48.2 PRIMARY PERITONEAL ADENOCARCINOMA: ICD-10-CM

## 2021-09-27 LAB — CANCER AG125 SERPL-ACNC: 11 U/ML (ref 0–30)

## 2021-09-27 PROCEDURE — 36415 COLL VENOUS BLD VENIPUNCTURE: CPT | Performed by: OBSTETRICS & GYNECOLOGY

## 2021-09-27 PROCEDURE — 86304 IMMUNOASSAY TUMOR CA 125: CPT | Performed by: OBSTETRICS & GYNECOLOGY

## 2021-09-28 ENCOUNTER — OFFICE VISIT (OUTPATIENT)
Dept: GYNECOLOGIC ONCOLOGY | Facility: CLINIC | Age: 71
End: 2021-09-28
Payer: MEDICARE

## 2021-09-28 DIAGNOSIS — C48.2 PRIMARY PERITONEAL ADENOCARCINOMA: Primary | ICD-10-CM

## 2021-09-28 DIAGNOSIS — Z15.09 BRCA2 GENE MUTATION POSITIVE IN FEMALE: ICD-10-CM

## 2021-09-28 DIAGNOSIS — Z15.01 BRCA2 GENE MUTATION POSITIVE IN FEMALE: ICD-10-CM

## 2021-09-28 DIAGNOSIS — Z15.02 BRCA2 GENE MUTATION POSITIVE IN FEMALE: ICD-10-CM

## 2021-09-28 PROCEDURE — G2012 BRIEF CHECK IN BY MD/QHP: HCPCS | Mod: 95,,, | Performed by: OBSTETRICS & GYNECOLOGY

## 2021-09-28 PROCEDURE — G2012 PR BRIEF CHECK IN, BY PHYS, EST PT: ICD-10-PCS | Mod: 95,,, | Performed by: OBSTETRICS & GYNECOLOGY

## 2021-09-28 RX ORDER — DICYCLOMINE HYDROCHLORIDE 10 MG/1
10 CAPSULE ORAL 2 TIMES DAILY
COMMUNITY
End: 2022-01-21

## 2021-09-29 ENCOUNTER — TELEPHONE (OUTPATIENT)
Dept: GYNECOLOGIC ONCOLOGY | Facility: CLINIC | Age: 71
End: 2021-09-29

## 2021-09-29 DIAGNOSIS — C56.9 MALIGNANT NEOPLASM OF OVARY, UNSPECIFIED LATERALITY: Primary | ICD-10-CM

## 2021-10-05 ENCOUNTER — TELEPHONE (OUTPATIENT)
Dept: GYNECOLOGIC ONCOLOGY | Facility: CLINIC | Age: 71
End: 2021-10-05

## 2021-11-04 VITALS — WEIGHT: 130 LBS | HEIGHT: 64 IN | BODY MASS INDEX: 22.2 KG/M2

## 2021-11-05 PROBLEM — H25.11 AGE-RELATED NUCLEAR CATARACT, RIGHT EYE: Status: ACTIVE | Noted: 2021-11-05

## 2021-11-05 PROBLEM — H25.041 POSTERIOR SUBCAPSULAR AGE-RELATED CATARACT, RIGHT EYE: Status: ACTIVE | Noted: 2021-11-05

## 2021-11-08 ENCOUNTER — HOSPITAL ENCOUNTER (OUTPATIENT)
Dept: PREADMISSION TESTING | Facility: HOSPITAL | Age: 71
Discharge: HOME | End: 2021-11-08

## 2021-11-08 ENCOUNTER — HOSPITAL ENCOUNTER (OUTPATIENT)
Dept: PREADMISSION TESTING | Facility: HOSPITAL | Age: 71
Discharge: HOME | End: 2021-11-08
Attending: OPHTHALMOLOGY
Payer: MEDICARE

## 2021-11-08 DIAGNOSIS — Z01.818 PRE-OP TESTING: ICD-10-CM

## 2021-11-29 ENCOUNTER — LAB VISIT (OUTPATIENT)
Dept: LAB | Facility: HOSPITAL | Age: 71
End: 2021-11-29
Attending: OBSTETRICS & GYNECOLOGY
Payer: MEDICARE

## 2021-11-29 DIAGNOSIS — C56.9 MALIGNANT NEOPLASM OF OVARY, UNSPECIFIED LATERALITY: ICD-10-CM

## 2021-11-29 LAB — CANCER AG125 SERPL-ACNC: 19 U/ML (ref 0–30)

## 2021-11-29 PROCEDURE — 86304 IMMUNOASSAY TUMOR CA 125: CPT | Performed by: OBSTETRICS & GYNECOLOGY

## 2021-11-29 PROCEDURE — 36415 COLL VENOUS BLD VENIPUNCTURE: CPT | Performed by: OBSTETRICS & GYNECOLOGY

## 2021-11-30 ENCOUNTER — TELEPHONE (OUTPATIENT)
Dept: GYNECOLOGIC ONCOLOGY | Facility: CLINIC | Age: 71
End: 2021-11-30
Payer: MEDICARE

## 2021-11-30 DIAGNOSIS — C56.9 MALIGNANT NEOPLASM OF OVARY, UNSPECIFIED LATERALITY: Primary | ICD-10-CM

## 2021-12-01 DIAGNOSIS — G47.01 INSOMNIA DUE TO MEDICAL CONDITION: ICD-10-CM

## 2021-12-01 DIAGNOSIS — F41.9 ANXIETY: Chronic | ICD-10-CM

## 2021-12-01 RX ORDER — ALPRAZOLAM 0.5 MG/1
0.5 TABLET ORAL 3 TIMES DAILY PRN
Qty: 90 TABLET | Refills: 0 | Status: SHIPPED | OUTPATIENT
Start: 2021-12-01 | End: 2022-03-14

## 2021-12-01 RX ORDER — TEMAZEPAM 15 MG/1
15 CAPSULE ORAL NIGHTLY PRN
Qty: 30 CAPSULE | Refills: 0 | Status: SHIPPED | OUTPATIENT
Start: 2021-12-01 | End: 2022-01-21

## 2021-12-06 ENCOUNTER — TELEPHONE (OUTPATIENT)
Dept: GYNECOLOGIC ONCOLOGY | Facility: CLINIC | Age: 71
End: 2021-12-06
Payer: MEDICARE

## 2021-12-30 DIAGNOSIS — F41.9 ANXIETY: Chronic | ICD-10-CM

## 2021-12-30 RX ORDER — ALPRAZOLAM 0.5 MG/1
TABLET ORAL
Qty: 90 TABLET | Refills: 0 | OUTPATIENT
Start: 2021-12-30

## 2022-01-20 NOTE — PRE-PROCEDURE INSTRUCTIONS
BEFORE THE PROCEDURE:    REPORT ANY CHANGE IN YOUR PHYSICAL CONDITION TO YOUR DOCTOR IMMEDIATELY.  SELF ISOLATE AND CHECK TEMPERATURE DAILY, IF TEMP OVER 100, CALL PHYSICIAN IMMEDIATELY.   NO SMOKING OR ALCOHOL FOR 72 HOURS BEFORE YOUR PROCEDURE.   DO NOT EAT OR DRINK ANYTHING AFTER MIDNIGHT THE NIGHT BEFORE YOUR PROCEDURE    THE MORNING OF YOUR PROCEDURE:      YOU  WILL GET A PHONE CALL THE DAY BEFORE YOUR PROCEDURE WITH YOUR APPOINTED TIME   NO MAKE UP OR NAIL POLISH.   ALL MINORS MUST BE ACCOMPANIED BY AN ADULT AT ALL TIMES.     TAKE A SHOWER/BATH THE NIGHT BEFORE YOUR PROCEDURE.     TAKE BLOOD PRESSURE MEDICATIONS THE MORNING OF YOUR PROCEDURE, WITH SMALL SIPS WATER, AS DIRECTED BY YOUR PHYSICIAN.   DO NOT TAKE ANY DIABETIC MEDICATIONS UNLESS DIRECTED TO DO SO BY YOUR PHYSICIAN.   CONTACT LENSES AND DENTURES MUST BE REMOVED.  A RESPONSIBLE ADULT MUST ACCOMPANY YOU HOME UPON DISCHARGE.   ONLY 1 VISITORS ALLOWED PER ROOM.   BRING YOUR EYE DROPS      COVID 19: Monday January 24,2022       YOUR THOUGHTS AND OPINIONS HELP US TO BETTER SERVE YOU.     PLEASE PARTICIPATE IN SURVEYS ABOUT YOUR CARE.     THANK YOU FOR CHOOSING [facility].

## 2022-01-21 ENCOUNTER — ANESTHESIA EVENT (OUTPATIENT)
Dept: SURGERY | Facility: HOSPITAL | Age: 72
End: 2022-01-21
Payer: MEDICARE

## 2022-01-21 VITALS — HEIGHT: 64 IN | WEIGHT: 125 LBS | BODY MASS INDEX: 21.34 KG/M2

## 2022-01-21 NOTE — ANESTHESIA PREPROCEDURE EVALUATION
01/21/2022  Murlene Mary Haase is a 71 y.o., female.    Anesthesia Evaluation    I have reviewed the Patient Summary Reports.   I have reviewed the NPO Status.   I have reviewed the Medications.     Review of Systems  Anesthesia Hx:  No problems with previous Anesthesia  Denies Family Hx of Anesthesia complications.   Denies Personal Hx of Anesthesia complications.   Social:  Non-Smoker    Cardiovascular:  Cardiovascular Normal     Pulmonary:  Pulmonary Normal    Renal/:  Renal/ Normal     Hepatic/GI:  Hepatic/GI Normal Gasrtitis   Musculoskeletal:   Arthritis     Neurological:   Neuromuscular Disease, FIBROMYALGIA   Endocrine:  Endocrine Normal        Physical Exam  General:  Well nourished    Airway/Jaw/Neck:  Airway Findings: Mouth Opening: Normal Tongue: Normal  General Airway Assessment: Adult  Mallampati: III  Improves to III with phonation.  TM Distance: Normal, at least 6 cm  Jaw/Neck Findings:  Neck ROM: Normal ROM      Dental:  Dental Findings: In tact   Chest/Lungs:  Chest/Lungs Findings: Clear to auscultation, Normal Respiratory Rate     Heart/Vascular:  Heart Findings: Rate: Normal  Rhythm: Regular Rhythm  Sounds: Normal        Mental Status:  Mental Status Findings:  Cooperative         Anesthesia Plan  Type of Anesthesia, risks & benefits discussed:  Anesthesia Type:  MAC    Patient's Preference:   Plan Factors:          Intra-op Monitoring Plan: standard ASA monitors  Intra-op Monitoring Plan Comments:   Post Op Pain Control Plan: multimodal analgesia  Post Op Pain Control Plan Comments:     Induction:    Beta Blocker:  Patient is not currently on a Beta-Blocker (No further documentation required).       Informed Consent: Patient understands risks and agrees with Anesthesia plan.  Questions answered. Anesthesia consent signed with patient.  ASA Score: 3     Day of Surgery Review of History  & Physical: I have interviewed and examined the patient. I have reviewed the patient's H&P dated:  There are no significant changes.  H&P update referred to the surgeon.         Ready For Surgery From Anesthesia Perspective.

## 2022-01-24 ENCOUNTER — HOSPITAL ENCOUNTER (OUTPATIENT)
Dept: PREADMISSION TESTING | Facility: HOSPITAL | Age: 72
Discharge: HOME | End: 2022-01-24
Attending: OPHTHALMOLOGY
Payer: MEDICARE

## 2022-01-24 DIAGNOSIS — Z01.818 PRE-OP TESTING: ICD-10-CM

## 2022-01-25 ENCOUNTER — ANESTHESIA (OUTPATIENT)
Dept: SURGERY | Facility: HOSPITAL | Age: 72
End: 2022-01-25
Payer: MEDICARE

## 2022-01-25 ENCOUNTER — HOSPITAL ENCOUNTER (OUTPATIENT)
Facility: HOSPITAL | Age: 72
Discharge: HOME | End: 2022-01-25
Attending: OPHTHALMOLOGY | Admitting: OPHTHALMOLOGY
Payer: MEDICARE

## 2022-01-25 VITALS
RESPIRATION RATE: 18 BRPM | SYSTOLIC BLOOD PRESSURE: 136 MMHG | OXYGEN SATURATION: 98 % | TEMPERATURE: 98 F | DIASTOLIC BLOOD PRESSURE: 65 MMHG | HEART RATE: 59 BPM

## 2022-01-25 DIAGNOSIS — H25.11 AGE-RELATED NUCLEAR CATARACT, RIGHT EYE: ICD-10-CM

## 2022-01-25 PROBLEM — H25.041 POSTERIOR SUBCAPSULAR AGE-RELATED CATARACT, RIGHT EYE: Status: RESOLVED | Noted: 2021-11-05 | Resolved: 2022-01-25

## 2022-01-25 PROCEDURE — 25000242 PHARM REV CODE 250 ALT 637 W/ HCPCS: Performed by: ANESTHESIOLOGY

## 2022-01-25 PROCEDURE — 63600175 PHARM REV CODE 636 W HCPCS: Performed by: OPHTHALMOLOGY

## 2022-01-25 PROCEDURE — V2632 POST CHMBR INTRAOCULAR LENS: HCPCS | Performed by: OPHTHALMOLOGY

## 2022-01-25 PROCEDURE — 37000009 HC ANESTHESIA EA ADD 15 MINS: Performed by: OPHTHALMOLOGY

## 2022-01-25 PROCEDURE — 36000707: Performed by: OPHTHALMOLOGY

## 2022-01-25 PROCEDURE — 25000003 PHARM REV CODE 250: Performed by: OPHTHALMOLOGY

## 2022-01-25 PROCEDURE — 36000706: Performed by: OPHTHALMOLOGY

## 2022-01-25 PROCEDURE — 71000015 HC POSTOP RECOV 1ST HR: Performed by: OPHTHALMOLOGY

## 2022-01-25 PROCEDURE — 37000008 HC ANESTHESIA 1ST 15 MINUTES: Performed by: OPHTHALMOLOGY

## 2022-01-25 RX ORDER — TETRACAINE HYDROCHLORIDE 5 MG/ML
SOLUTION OPHTHALMIC
Status: DISCONTINUED | OUTPATIENT
Start: 2022-01-25 | End: 2022-01-25 | Stop reason: HOSPADM

## 2022-01-25 RX ORDER — MOXIFLOXACIN 5 MG/ML
SOLUTION/ DROPS OPHTHALMIC
Status: DISCONTINUED | OUTPATIENT
Start: 2022-01-25 | End: 2022-01-25 | Stop reason: HOSPADM

## 2022-01-25 RX ORDER — LIDOCAINE HYDROCHLORIDE 10 MG/ML
INJECTION, SOLUTION EPIDURAL; INFILTRATION; INTRACAUDAL; PERINEURAL
Status: DISCONTINUED | OUTPATIENT
Start: 2022-01-25 | End: 2022-01-25 | Stop reason: HOSPADM

## 2022-01-25 RX ORDER — BROMFENAC SODIUM 0.81 MG/ML
SOLUTION/ DROPS OPHTHALMIC
Status: DISCONTINUED | OUTPATIENT
Start: 2022-01-25 | End: 2022-01-25 | Stop reason: HOSPADM

## 2022-01-25 RX ORDER — PHENYLEPHRINE HYDROCHLORIDE 25 MG/ML
1 SOLUTION/ DROPS OPHTHALMIC
Status: COMPLETED | OUTPATIENT
Start: 2022-01-25 | End: 2022-01-25

## 2022-01-25 RX ORDER — MOXIFLOXACIN 5 MG/ML
1 SOLUTION/ DROPS OPHTHALMIC
Status: COMPLETED | OUTPATIENT
Start: 2022-01-25 | End: 2022-01-25

## 2022-01-25 RX ORDER — LIDOCAINE HYDROCHLORIDE 10 MG/ML
1 INJECTION, SOLUTION EPIDURAL; INFILTRATION; INTRACAUDAL; PERINEURAL ONCE
Status: ACTIVE | OUTPATIENT
Start: 2022-01-25

## 2022-01-25 RX ORDER — LIDOCAINE HYDROCHLORIDE 10 MG/ML
0.5 INJECTION, SOLUTION EPIDURAL; INFILTRATION; INTRACAUDAL; PERINEURAL ONCE
Status: COMPLETED | OUTPATIENT
Start: 2022-01-25 | End: 2023-07-19

## 2022-01-25 RX ORDER — MIDAZOLAM HCL 2 MG/ML
4 SYRUP ORAL ONCE
Status: COMPLETED | OUTPATIENT
Start: 2022-01-25 | End: 2022-01-25

## 2022-01-25 RX ORDER — TETRACAINE HYDROCHLORIDE 5 MG/ML
1 SOLUTION OPHTHALMIC
Status: COMPLETED | OUTPATIENT
Start: 2022-01-25 | End: 2022-01-25

## 2022-01-25 RX ORDER — PREDNISOLONE ACETATE 10 MG/ML
SUSPENSION/ DROPS OPHTHALMIC
Status: DISCONTINUED | OUTPATIENT
Start: 2022-01-25 | End: 2022-01-25 | Stop reason: HOSPADM

## 2022-01-25 RX ORDER — TROPICAMIDE 10 MG/ML
1 SOLUTION/ DROPS OPHTHALMIC
Status: COMPLETED | OUTPATIENT
Start: 2022-01-25 | End: 2022-01-25

## 2022-01-25 RX ADMIN — MIDAZOLAM HYDROCHLORIDE 4 MG: 2 SYRUP ORAL at 10:01

## 2022-01-25 RX ADMIN — TETRACAINE HYDROCHLORIDE 1 DROP: 5 SOLUTION OPHTHALMIC at 09:01

## 2022-01-25 RX ADMIN — PHENYLEPHRINE HYDROCHLORIDE 1 DROP: 25 SOLUTION/ DROPS OPHTHALMIC at 09:01

## 2022-01-25 RX ADMIN — TROPICAMIDE 1 DROP: 10 SOLUTION/ DROPS OPHTHALMIC at 09:01

## 2022-01-25 RX ADMIN — MOXIFLOXACIN 1 DROP: 5 SOLUTION/ DROPS OPHTHALMIC at 09:01

## 2022-01-25 NOTE — DISCHARGE INSTRUCTIONS
-RELAX AT HOME FOR THE REST OF THE DAY. YOU MAY EAT ANYTHING YOU LIKE.   -PLAN TO SEE DR RICKETTS TOMORROW AT THE OFFICE. BRING ALL EYE DROPS WITH YOU TO THIS APPOINTMENT.    -PUT EYE DROPS IN THE AFFECTED EYE AS PER DR RICKETTS'S EYE DROP SCHEDULED. -USE IN ANY ORDER, WAIT 5 MINUTES BETWEEN DROPS.  -REMOVE EYE SHIELD WHILE PUTTING EYE DROPS IN.    -DO NOT RUB YOUR EYE!!  -DO NOT EXERT YOURSELF - NO HEAVY LIFTING, RUNNING OR SWIMMING FOR 1 WEEK.  -YOU MAY SHOWER, BUT DON'T ALLOW WATER INTO YOUR EYE FOR 1 WEEK.  -WEAR PROTECTIVE SUNGLASSES DURING THE DAY AND AN EYE SHIELD AT NIGHT FOR 1 WEEK.    -IF YOU HAVE PAIN, REDNESS OR DECREASED VISION, CALL DR RICKETTS'S OFFICE [phone] OR IF AFTER HOURS CALL [phone].

## 2022-01-25 NOTE — ANESTHESIA POSTPROCEDURE EVALUATION
Anesthesia Post Evaluation    Patient: Murlene Mary Haase    Procedure(s) Performed: Procedure(s) (LRB):  PHACOEMULSIFICATION, CATARACT, WITH IOL INSERTION (Right)    Final Anesthesia Type: MAC      Patient location during evaluation: OPS  Patient participation: Yes- Able to Participate  Level of consciousness: awake  Post-procedure vital signs: reviewed and stable  Pain management: adequate  Airway patency: patent    PONV status at discharge: No PONV  Anesthetic complications: no      Cardiovascular status: blood pressure returned to baseline  Respiratory status: spontaneous ventilation  Hydration status: euvolemic  Follow-up not needed.          Vitals Value Taken Time   /65 01/25/22 1200   Temp 36.5 °C (97.7 °F) 01/25/22 1200   Pulse 59 01/25/22 1200   Resp 18 01/25/22 1200   SpO2 98 % 01/25/22 1200         No case tracking events are documented in the log.      Pain/Rosemary Score: Rosemary Score: 10 (1/25/2022 11:52 AM)

## 2022-01-25 NOTE — INTERVAL H&P NOTE
The patient has been examined and the H&P has been reviewed:    I concur with the findings and no changes have occurred since H&P was written.    Surgery risks, benefits and alternative options discussed and understood by patient/family.          Active Hospital Problems    Diagnosis  POA    *Age-related nuclear cataract, right eye [H25.11]  Yes    Posterior subcapsular age-related cataract, right eye [H25.041]  Yes      Resolved Hospital Problems   No resolved problems to display.

## 2022-01-25 NOTE — TRANSFER OF CARE
Anesthesia Transfer of Care Note    Patient: Murlene Mary Haase    Procedure(s) Performed: Procedure(s) (LRB):  PHACOEMULSIFICATION, CATARACT, WITH IOL INSERTION (Right)    Patient location: Other: OR    Anesthesia Type: MAC    Transport from OR: Transported from OR on room air with adequate spontaneous ventilation    Post pain: adequate analgesia    Post assessment: no apparent anesthetic complications    Post vital signs: stable    Level of consciousness: awake    Nausea/Vomiting: no nausea/vomiting    Complications: none    Transfer of care protocol was followed      Last vitals:   152/72  16 RR  37 C TEMP  99% O2 SAT  55 HR

## 2022-01-25 NOTE — DISCHARGE SUMMARY
[facility]  Brief Discharge Note    Patient Name:  Murlene Mary Haase   MRN:  [id number]    :  1950   Admission Date:  2022    Discharge Date:  2022   Attending Physician: Ward Abraham MD     Procedure:  PHACOEMULSIFICATION, CATARACT (Right)    OUTCOME: Patient tolerated procedure well without complication and is now ready for discharge.    DISPOSITION: Home or Self Care    FINAL DIAGNOSIS:  Age-related nuclear cataract, right eye                                     Age-related posterior subcapsular cataract, right eye    FOLLOWUP: 1 day in clinic    DISCHARGE INSTRUCTIONS:  Wear eye shield until your schedule appointment with doctor.                                                       Only remove eye shield to apply eye drops.                                                       Follow the post-op eye instructions given from the clinic.

## 2022-03-08 ENCOUNTER — LAB VISIT (OUTPATIENT)
Dept: LAB | Facility: HOSPITAL | Age: 72
End: 2022-03-08
Attending: OBSTETRICS & GYNECOLOGY
Payer: MEDICARE

## 2022-03-08 ENCOUNTER — TELEPHONE (OUTPATIENT)
Dept: GYNECOLOGIC ONCOLOGY | Facility: CLINIC | Age: 72
End: 2022-03-08
Payer: MEDICARE

## 2022-03-08 DIAGNOSIS — C56.9 MALIGNANT NEOPLASM OF OVARY, UNSPECIFIED LATERALITY: Primary | ICD-10-CM

## 2022-03-08 DIAGNOSIS — C56.9 MALIGNANT NEOPLASM OF OVARY, UNSPECIFIED LATERALITY: ICD-10-CM

## 2022-03-08 LAB
ALBUMIN SERPL BCP-MCNC: 3.6 G/DL (ref 3.5–5.2)
ALP SERPL-CCNC: 83 U/L (ref 55–135)
ALT SERPL W/O P-5'-P-CCNC: 29 U/L (ref 10–44)
ANION GAP SERPL CALC-SCNC: 6 MMOL/L (ref 8–16)
AST SERPL-CCNC: 28 U/L (ref 10–40)
BASOPHILS # BLD AUTO: 0.02 K/UL (ref 0–0.2)
BASOPHILS NFR BLD: 0.4 % (ref 0–1.9)
BILIRUB SERPL-MCNC: 0.4 MG/DL (ref 0.1–1)
BUN SERPL-MCNC: 9 MG/DL (ref 8–23)
CALCIUM SERPL-MCNC: 9.3 MG/DL (ref 8.7–10.5)
CHLORIDE SERPL-SCNC: 102 MMOL/L (ref 95–110)
CO2 SERPL-SCNC: 28 MMOL/L (ref 23–29)
CREAT SERPL-MCNC: 0.5 MG/DL (ref 0.5–1.4)
DIFFERENTIAL METHOD: ABNORMAL
EOSINOPHIL # BLD AUTO: 0 K/UL (ref 0–0.5)
EOSINOPHIL NFR BLD: 0.4 % (ref 0–8)
ERYTHROCYTE [DISTWIDTH] IN BLOOD BY AUTOMATED COUNT: 13.8 % (ref 11.5–14.5)
EST. GFR  (AFRICAN AMERICAN): >60 ML/MIN/1.73 M^2
EST. GFR  (NON AFRICAN AMERICAN): >60 ML/MIN/1.73 M^2
GLUCOSE SERPL-MCNC: 88 MG/DL (ref 70–110)
HCT VFR BLD AUTO: 40.6 % (ref 37–48.5)
HGB BLD-MCNC: 13.6 G/DL (ref 12–16)
IMM GRANULOCYTES # BLD AUTO: 0.05 K/UL (ref 0–0.04)
IMM GRANULOCYTES NFR BLD AUTO: 1 % (ref 0–0.5)
LYMPHOCYTES # BLD AUTO: 1.5 K/UL (ref 1–4.8)
LYMPHOCYTES NFR BLD: 30.1 % (ref 18–48)
MCH RBC QN AUTO: 31.7 PG (ref 27–31)
MCHC RBC AUTO-ENTMCNC: 33.5 G/DL (ref 32–36)
MCV RBC AUTO: 95 FL (ref 82–98)
MONOCYTES # BLD AUTO: 0.5 K/UL (ref 0.3–1)
MONOCYTES NFR BLD: 9 % (ref 4–15)
NEUTROPHILS # BLD AUTO: 3 K/UL (ref 1.8–7.7)
NEUTROPHILS NFR BLD: 59.1 % (ref 38–73)
NRBC BLD-RTO: 0 /100 WBC
PLATELET # BLD AUTO: 253 K/UL (ref 150–450)
PMV BLD AUTO: 9.1 FL (ref 9.2–12.9)
POTASSIUM SERPL-SCNC: 4.1 MMOL/L (ref 3.5–5.1)
PROT SERPL-MCNC: 6.9 G/DL (ref 6–8.4)
RBC # BLD AUTO: 4.29 M/UL (ref 4–5.4)
SODIUM SERPL-SCNC: 136 MMOL/L (ref 136–145)
WBC # BLD AUTO: 4.99 K/UL (ref 3.9–12.7)

## 2022-03-08 PROCEDURE — 80053 COMPREHEN METABOLIC PANEL: CPT | Performed by: OBSTETRICS & GYNECOLOGY

## 2022-03-08 PROCEDURE — 85025 COMPLETE CBC W/AUTO DIFF WBC: CPT | Performed by: OBSTETRICS & GYNECOLOGY

## 2022-03-08 PROCEDURE — 86304 IMMUNOASSAY TUMOR CA 125: CPT | Performed by: OBSTETRICS & GYNECOLOGY

## 2022-03-08 PROCEDURE — 36415 COLL VENOUS BLD VENIPUNCTURE: CPT | Performed by: OBSTETRICS & GYNECOLOGY

## 2022-03-08 NOTE — TELEPHONE ENCOUNTER
----- Message from Kurtis Rasmussen sent at 3/8/2022  9:04 AM CST -----  Who called?:PT      What is the request in detail: PT states she needs a order to be sent over so the she can have lab work done.        Can the clinic reply by [facility]?:Yes        Best Call Back Number:[phone]

## 2022-03-08 NOTE — TELEPHONE ENCOUNTER
----- Message from Kimberly Tobar sent at 3/8/2022  9:43 AM CST -----  Type:  Patient Returning Call    Who Called:     Who Left Message for Patient:     Does the patient know what this is regarding?: missed call     Best Call Back Number:   [phone]    Additional Information:

## 2022-03-09 LAB — CANCER AG125 SERPL-ACNC: 21 U/ML (ref 0–30)

## 2022-03-14 ENCOUNTER — OFFICE VISIT (OUTPATIENT)
Dept: GYNECOLOGIC ONCOLOGY | Facility: CLINIC | Age: 72
End: 2022-03-14
Payer: MEDICARE

## 2022-03-14 VITALS
DIASTOLIC BLOOD PRESSURE: 71 MMHG | WEIGHT: 125.44 LBS | SYSTOLIC BLOOD PRESSURE: 143 MMHG | BODY MASS INDEX: 21.53 KG/M2 | HEART RATE: 70 BPM

## 2022-03-14 DIAGNOSIS — C56.9 MALIGNANT NEOPLASM OF OVARY, UNSPECIFIED LATERALITY: Primary | ICD-10-CM

## 2022-03-14 PROCEDURE — 99212 OFFICE O/P EST SF 10 MIN: CPT | Mod: PBBFAC | Performed by: OBSTETRICS & GYNECOLOGY

## 2022-03-14 PROCEDURE — 99214 PR OFFICE/OUTPT VISIT, EST, LEVL IV, 30-39 MIN: ICD-10-PCS | Mod: S$PBB,,, | Performed by: OBSTETRICS & GYNECOLOGY

## 2022-03-14 PROCEDURE — 99999 PR PBB SHADOW E&M-EST. PATIENT-LVL II: ICD-10-PCS | Mod: PBBFAC,,, | Performed by: OBSTETRICS & GYNECOLOGY

## 2022-03-14 PROCEDURE — 99214 OFFICE O/P EST MOD 30 MIN: CPT | Mod: S$PBB,,, | Performed by: OBSTETRICS & GYNECOLOGY

## 2022-03-14 PROCEDURE — 99999 PR PBB SHADOW E&M-EST. PATIENT-LVL II: CPT | Mod: PBBFAC,,, | Performed by: OBSTETRICS & GYNECOLOGY

## 2022-03-22 NOTE — PROGRESS NOTES
"Subjective:      Patient ID: Murlene Mary Haase is a 71 y.o. female.    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 She was on niraparib maintenance 200 mg daily. Started in March 2021. In Sep 2021 she asked to stop it because of worsening fatigue and GI upset.      Sep 2021: : 11. Previously 8. She is feeling better off niraparib. Also saw a GI doctor and started on glutamine and bentyl. More active since off niraparib. Also bloating and gas have resolved. Back to "running".      Sees Dr. Tana Deutsch her medical oncologist. But reports having not seen her in a while.        is 21 3/2022      Colonoscopy: Aug 11, 2016: inflammation and diverticulosis  EGD: Aug 11, 2016: normal          Her oncologic history is:    July 2013: She was taken to the operating room on July 25, 2013 for preoperative diagnosis consistent with ovarian cancer. 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 At that time she underwent an omentectomy and bilateral salpingo-oophorectomy however she had bulky residual disease along the diaphragms as well as paracolic gutters and pelvis.    She was then treated with 3 cycles of dose dense paclitaxel and carboplatin. A CT scan obtained after this showed the following:    Subcentimeter hepatic lesions, too small to accurately characterize.    Subtle, increased soft tissue attenuation along the periphery of the right hepatic lobe that is nonspecific but is favored to represent a prominent right hemidiaphragm. Peritoneal thickening is thought to be less likely but cannot be entirely excluded   and correlation with prior imaging studies is advised if available.     Large amount of stool throughout entire colon.    L1 vertebral body compression fracture of unknown chronicity.   Small pericardial effusion.    She then received 2 more cycles of dose dense paclitaxel " and carboplatin and then received one cycle of standard Taxol and carboplatin.     Dec 2013:  Her last chemotherapy was December 26, 2013.    At the completion of 6 cycles of postoperative Taxol and carboplatin, her  was 7 and her CT scan of the abdomen and pelvis showed no evidence for disease.        Feb 2016,  was 24.May 2016  was 35. June 2016: 33.   May 2016: CT scan abdomen and pelvis was negative.   August 2016:  was 31.  Nov 2016: C A 125 36  Feb 2017:  56. She has had a mildly elevated  in the 30s with negative scanning.   Feb 2017:  was 56. CT scan showed possible intussusception and Pelvis:  2.0 x 0.9 cm soft tissue attenuating focus in the anterior mid pelvis that abuts bowel, and is present on arterial/delayed phases.      PET was done and shows adenopathy as well.   June 2017: Completed 6 cycles of taxol and carboplatin on 6/29/2017 for recurrent ovarian cancer.  Received chemotherapy with Dr. Tana Deutsch.  CT scan after completion of 6 cycles of taxol and carboplatin shows stable right pelvic lymph node index lesion measuring 0.7 cm with some prominent lymph nodes in the jessenia hepatis, unchanged from prior.  There is no new lymphadenopathy identified.   was 8.       Oct 2017:  She tried Niraparib but had a reaction to it on two occasions and stopped this. Started with 3 tablets day one and had SOB and chest pain. Waited several days and restarted with 1 tablet. Had the same symptoms and did not want to resume.      May 2020:  is 36.     Nov 2020: : 45. CT scan AP: 2.5 cm soft tissue mass in the jessenia hepatis does abut the pancreas though favored to be an enlarged node.  There is an additional enlarged node in the right hemipelvis.  Findings concerning for metastatic disease. Started taxol and carboplatin.      Jan 2021: She has completed 3 cycles of chemotherapy. Taxol and carboplatin x 2 and taxol x 1 due to carboplatin reaction at time of  C3.   : 15. CT scan AP:  Previously identified soft tissue mass/lymph node in the jessenia hepatitis measures 1.5 cm (axial series 2, image 32), previously measured 2.5 cm. Previously identified soft tissue nodule/lymph node along the course of the right external iliac measures 1.1 cm (axial series 2, image 88), previously measured 1.7 cm (axial series 2, image 100).     Mar 2021: has completed 6 cycles of chemotherapy.  Taxol and carboplatin x 2 and taxol x 4 due to carboplatin reaction at time of C3.: 10. Last chemotherapy was 3/3/21. CT scan AP: jessenia hepatis node 1.3 cm, pelvic node 9 mm. She has a BRCA 2 mutation.Started niraparib 200 mg daily.                Patient with a BRCA 2 mutation.   No available tissue for STRATA testing as her surgery was in 2013.    Review of Systems   Constitutional: Negative for appetite change, chills, fatigue and fever.   HENT: Negative for mouth sores.    Respiratory: Negative for cough and shortness of breath.    Cardiovascular: Negative for leg swelling.   Gastrointestinal: Negative for abdominal pain, blood in stool, constipation and diarrhea.   Endocrine: Negative for cold intolerance.   Genitourinary: Negative for dysuria and vaginal bleeding.   Musculoskeletal: Negative for myalgias.   Skin: Negative for rash.   Allergic/Immunologic: Negative.    Neurological: Negative for weakness and numbness.   Hematological: Negative for adenopathy. Does not bruise/bleed easily.   Psychiatric/Behavioral: Negative for confusion.       Objective:   Physical Exam:   Constitutional: She is oriented to person, place, and time. She appears well-developed and well-nourished.    HENT:   Head: Normocephalic and atraumatic.    Eyes: Pupils are equal, round, and reactive to light. EOM are normal.    Neck: No thyromegaly present.    Cardiovascular: Normal rate, regular rhythm and intact distal pulses.     Pulmonary/Chest: Effort normal and breath sounds normal. No respiratory distress.  She has no wheezes.        Abdominal: Soft. Bowel sounds are normal. She exhibits no distension and no mass. There is no abdominal tenderness.     Genitourinary:    Vagina and rectum normal.      Pelvic exam was performed with patient supine.   There is no lesion on the right labia. There is no lesion on the left labia. Right adnexum displays no mass. Left adnexum displays no mass. Vaginal cuff normal.  Cervix is absent.Uterus is absent.           Musculoskeletal: Normal range of motion and moves all extremeties.      Lymphadenopathy:     She has no cervical adenopathy.        Right: No supraclavicular adenopathy present.        Left: No supraclavicular adenopathy present.    Neurological: She is alert and oriented to person, place, and time.    Skin: Skin is warm and dry. No rash noted.    Psychiatric: She has a normal mood and affect.       Assessment:     1. Malignant neoplasm of ovary, unspecified laterality        Plan:     Orders Placed This Encounter   Procedures         Discontinued niraparib previously. Wants to focus on quality of life.   No evidence of disease on today's exam.    21, some slight elevation from baseline. Does not desire any further interventions (CT, etc) at this time.   She overall feels well and is asymptomatic.     Plan for RTC in 3 months with  or sooner if needed.     I spent approximately 30 minutes reviewing the available records and evaluating the patient, out of which over 50% of the time was spent face to face with the patient in counseling and coordinating this patient's care.

## 2022-05-03 ENCOUNTER — OFFICE VISIT (OUTPATIENT)
Dept: OBSTETRICS AND GYNECOLOGY | Facility: CLINIC | Age: 72
End: 2022-05-03
Payer: MEDICARE

## 2022-05-03 VITALS
HEART RATE: 87 BPM | BODY MASS INDEX: 21.11 KG/M2 | SYSTOLIC BLOOD PRESSURE: 144 MMHG | DIASTOLIC BLOOD PRESSURE: 86 MMHG | WEIGHT: 123 LBS

## 2022-05-03 DIAGNOSIS — Z15.09 BRCA2 GENE MUTATION POSITIVE IN FEMALE: Primary | ICD-10-CM

## 2022-05-03 DIAGNOSIS — Z15.01 BRCA2 GENE MUTATION POSITIVE IN FEMALE: Primary | ICD-10-CM

## 2022-05-03 DIAGNOSIS — F51.01 PRIMARY INSOMNIA: ICD-10-CM

## 2022-05-03 DIAGNOSIS — N95.1 MENOPAUSAL SYMPTOMS: ICD-10-CM

## 2022-05-03 DIAGNOSIS — Z15.02 BRCA2 GENE MUTATION POSITIVE IN FEMALE: Primary | ICD-10-CM

## 2022-05-03 DIAGNOSIS — R97.1 ELEVATED CANCER ANTIGEN 125 (CA-125): ICD-10-CM

## 2022-05-03 DIAGNOSIS — C48.2 PRIMARY PERITONEAL ADENOCARCINOMA: ICD-10-CM

## 2022-05-03 LAB — TESTOST SERPL-MCNC: 28 NG/DL (ref 5–73)

## 2022-05-03 PROCEDURE — 99999 PR PBB SHADOW E&M-EST. PATIENT-LVL III: ICD-10-PCS | Mod: PBBFAC,,, | Performed by: OBSTETRICS & GYNECOLOGY

## 2022-05-03 PROCEDURE — 84403 ASSAY OF TOTAL TESTOSTERONE: CPT | Performed by: OBSTETRICS & GYNECOLOGY

## 2022-05-03 PROCEDURE — 99999 PR PBB SHADOW E&M-EST. PATIENT-LVL III: CPT | Mod: PBBFAC,,, | Performed by: OBSTETRICS & GYNECOLOGY

## 2022-05-03 PROCEDURE — 99214 OFFICE O/P EST MOD 30 MIN: CPT | Mod: S$PBB,,, | Performed by: OBSTETRICS & GYNECOLOGY

## 2022-05-03 PROCEDURE — 99213 OFFICE O/P EST LOW 20 MIN: CPT | Mod: PBBFAC | Performed by: OBSTETRICS & GYNECOLOGY

## 2022-05-03 PROCEDURE — 82670 ASSAY OF TOTAL ESTRADIOL: CPT | Performed by: OBSTETRICS & GYNECOLOGY

## 2022-05-03 PROCEDURE — 99214 PR OFFICE/OUTPT VISIT, EST, LEVL IV, 30-39 MIN: ICD-10-PCS | Mod: S$PBB,,, | Performed by: OBSTETRICS & GYNECOLOGY

## 2022-05-03 PROCEDURE — 83001 ASSAY OF GONADOTROPIN (FSH): CPT | Performed by: OBSTETRICS & GYNECOLOGY

## 2022-05-03 RX ORDER — ESCITALOPRAM OXALATE 10 MG/1
10 TABLET ORAL NIGHTLY
Qty: 30 TABLET | Refills: 2 | Status: SHIPPED | OUTPATIENT
Start: 2022-05-03 | End: 2023-06-15 | Stop reason: SDUPTHER

## 2022-05-03 NOTE — PROGRESS NOTES
Subjective:    Patient ID: Murlene Mary Haase is a 72 y.o. y.o. female    Chief Complaint:   Chief Complaint   Patient presents with    Female  Problem     Patient c/o night sweats, hot flashes, mood swings, restlessness.        History of Present Illness:  Ruiz presents today for evaluation of menopausal symptoms.  She complains of hot flashes, night sweats, mood swings, and restlessness.  She has noticed a few stray facial hairs from time to time.  Has a history significant for ovarian/primary peritoneal cancer as well as positive mutation for BRCA2.     In discussion with her I explained that many of her symptoms are usually because low hormones in the body.  She also understands that her cancer is estrogen driven.  She would like to know exactly what her hormone levels are and would like to proceed with blood work to determine this.  We will oblige her and I also discussed that there are some medications that may be able to help her symptoms without being hormones that would feed cancer.      Review of Systems   Constitutional: Positive for fatigue. Negative for chills and fever.   Respiratory: Negative for shortness of breath.    Cardiovascular: Negative for chest pain.   Gastrointestinal: Negative for abdominal pain, constipation, diarrhea and nausea.   Genitourinary: Positive for hot flashes. Negative for bladder incontinence, dysuria, pelvic pain and vaginal bleeding.   Neurological: Negative for headaches.   Psychiatric/Behavioral: Positive for sleep disturbance. Negative for depression.         Objective:    Vital Signs:  Vitals:    05/03/22 1119   BP: (!) 144/86   Pulse: 87     Wt Readings from Last 1 Encounters:   05/03/22 55.8 kg (123 lb)     Body mass index is 21.11 kg/m².    Physical Exam:  General:  alert, no distress   Skin:  Skin color, texture, turgor normal. No rashes or lesions   Abdomen:  Soft, nontender   Extremities: No cyanosis, clubbing, edema     Discussed use of SSRIs to help with  menopausal symptoms.  I explained the most studied medication in this realm is Effexor, but she states she has used this in the past and it did not help.  Several other options were offered and declined due to lack of efficacy.  We currently have settled on Lexapro as it is generally taken at night as it does makes some people sleepy.  Use and side effects of medication discussed and patient desires to proceed.  All questions answered    I spent a total of 45 minutes on the day of the visit.  This includes face to face time and non-face to face time preparing to see the patient (eg, review of tests), obtaining and/or reviewing separately obtained history, documenting clinical information in the electronic or other health record, independently interpreting results and communicating results to the patient/family/caregiver, or care coordinator.      Assessment:      1. BRCA2 gene mutation positive in female    2. Primary peritoneal adenocarcinoma    3. Elevated cancer antigen 125 (CA-125)    4. Menopausal symptoms    5. Primary insomnia          Plan:      BRCA2 gene mutation positive in female    Primary peritoneal adenocarcinoma    Elevated cancer antigen 125 (CA-125)    Menopausal symptoms  -     Estradiol; Future; Expected date: 05/03/2022  -     Testosterone; Future; Expected date: 05/03/2022  -     Follicle Stimulating Hormone; Future; Expected date: 05/03/2022  -     EScitalopram oxalate (LEXAPRO) 10 MG tablet; Take 1 tablet (10 mg total) by mouth every evening.  Dispense: 30 tablet; Refill: 2    Primary insomnia    Return to clinic in 3 months for follow-up with symptoms       Татьяна Paredes MD, FACOG   05/03/2022 11:41 AM

## 2022-05-04 LAB
ESTRADIOL SERPL-MCNC: 14 PG/ML
FSH SERPL-ACNC: 2.69 MIU/ML

## 2022-06-06 ENCOUNTER — TELEPHONE (OUTPATIENT)
Dept: GYNECOLOGIC ONCOLOGY | Facility: CLINIC | Age: 72
End: 2022-06-06
Payer: MEDICARE

## 2022-06-06 NOTE — TELEPHONE ENCOUNTER
----- Message from Ivana Estrada sent at 6/6/2022  8:51 AM CDT -----  Regarding: orders  Name of Who is Calling:HAASE, MURLENE MARY [[id number]]          What is the request in detail: Patient is requesting a call back in reference to orders for labs           Can the clinic reply by [facility]:  yes           [facility][phone]

## 2022-06-06 NOTE — TELEPHONE ENCOUNTER
Patient states [facility] informed her there was no order for her labs for 6/14/22.     Spoke with DAWN swain and was informed labs are in on there in. Pt informed and has no further questions/concerns at this time.

## 2022-06-08 ENCOUNTER — TELEPHONE (OUTPATIENT)
Dept: GYNECOLOGIC ONCOLOGY | Facility: CLINIC | Age: 72
End: 2022-06-08
Payer: MEDICARE

## 2022-06-08 NOTE — TELEPHONE ENCOUNTER
Spoke with our patient about her reschedule appointment in gyn oncology she agreed she voiced understanding of the date, time and location. All questions answered appointment mail. MA/DESIREE /Preceptor aCm MONTALVO

## 2022-06-08 NOTE — TELEPHONE ENCOUNTER
----- Message from Chidi Escobedo RN sent at 6/8/2022  1:18 PM CDT -----  Patient may have been calling you back for appt rescheduling.     Michelle  ----- Message -----  From: Kurtis Rasmussen  Sent: 6/8/2022  12:56 PM CDT  To: [facility] Staff    Who Called:PT        Who Left Message for Patient:Chidi Escobedo RN        Does the patient know what this is regarding?:YES        Best Call Back Number:[phone]        Additional Information:PT would liked to be messaged on [employer].

## 2022-06-08 NOTE — TELEPHONE ENCOUNTER
Pt states she received voicemail regarding rescheduling of appt. Patient has no additional questions/concerns at this time.

## 2022-06-08 NOTE — TELEPHONE ENCOUNTER
----- Message from Kurtis Rasmussen sent at 6/8/2022 12:54 PM CDT -----  Who Called:PT        Who Left Message for Patient:Chidi Escobedo RN        Does the patient know what this is regarding?:YES        Best Call Back Number:[phone]        Additional Information:PT would liked to be messaged on [employer].

## 2022-06-14 ENCOUNTER — LAB VISIT (OUTPATIENT)
Dept: LAB | Facility: HOSPITAL | Age: 72
End: 2022-06-14
Attending: OBSTETRICS & GYNECOLOGY
Payer: MEDICARE

## 2022-06-14 DIAGNOSIS — C56.9 MALIGNANT NEOPLASM OF OVARY, UNSPECIFIED LATERALITY: ICD-10-CM

## 2022-06-14 PROCEDURE — 86304 IMMUNOASSAY TUMOR CA 125: CPT | Performed by: OBSTETRICS & GYNECOLOGY

## 2022-06-14 PROCEDURE — 36415 COLL VENOUS BLD VENIPUNCTURE: CPT | Performed by: OBSTETRICS & GYNECOLOGY

## 2022-06-15 LAB — CANCER AG125 SERPL-ACNC: 31 U/ML (ref 0–30)

## 2022-06-16 ENCOUNTER — TELEPHONE (OUTPATIENT)
Dept: GYNECOLOGIC ONCOLOGY | Facility: CLINIC | Age: 72
End: 2022-06-16
Payer: MEDICARE

## 2022-06-17 ENCOUNTER — TELEPHONE (OUTPATIENT)
Dept: GYNECOLOGIC ONCOLOGY | Facility: CLINIC | Age: 72
End: 2022-06-17
Payer: MEDICARE

## 2022-06-17 NOTE — TELEPHONE ENCOUNTER
Returned call to patient. No answer. Left voicemail.   I did speak with the patient's daughter Susana. Explained elevation in  and concerns for recurrent cancer. Next step would be CT. They would like some time to think about things. We have an audio visit scheduled next week.      ----- Message from Kavya Rivas NP sent at 6/16/2022 12:29 PM CDT -----  Regarding: FW: Returning Call to Provider  Contact: Ruiz    ----- Message -----  From: Argenis Mcguire  Sent: 6/16/2022  12:18 PM CDT  To: [facility] Staff  Subject: Returning Call to Provider                       Consult/Advisory:           Name Of Caller: Ruiz  Contact Preference?: [phone]         What is the nature of the call?:    Pt is returning call to  to review  and can be reached at the number above.      Thank you for all that you do for our patients!

## 2022-06-17 NOTE — TELEPHONE ENCOUNTER
Spoke to pt's daughter Susana, she is requesting a call back from Dr. Luque to discuss her mother's recent labs. Susaan can be reached at [phone].

## 2022-06-17 NOTE — TELEPHONE ENCOUNTER
"----- Message from Reanna Rueda sent at 6/17/2022  8:59 AM CDT -----  Name Of Caller: self    Contact Preference?: [phone] (Susana-daughter)    What is the nature of the call?: in regards to lab results           Additional Notes:  "Thank you for all that you do for our patients'"     "

## 2022-06-22 NOTE — PROGRESS NOTES
"Established Patient - Audio Only Telehealth Visit     The patient location is: Home  The chief complaint leading to consultation is: follow up  Visit type: Virtual visit with audio only (telephone)  Total time spent with patient: 30min       The reason for the audio only service rather than synchronous audio and video virtual visit was related to technical difficulties or patient preference/necessity.     Each patient to whom I provide medical services by telemedicine is:  (1) informed of the relationship between the physician and patient and the respective role of any other health care provider with respect to management of the patient; and (2) notified that they may decline to receive medical services by telemedicine and may withdraw from such care at any time. Patient verbally consented to receive this service via voice-only telephone call.       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 She was on niraparib maintenance 200 mg daily. Started in March 2021. In Sep 2021 she asked to stop it because of worsening fatigue and GI upset.      Sep 2021: : 11. Previously 8. She is feeling better off niraparib. Also saw a GI doctor and started on glutamine and bentyl. More active since off niraparib. Also bloating and gas have resolved. Back to "running".      Sees Dr. Tana Deutsch her medical oncologist. But reports having not seen her in a while.       Last visit 3/2022:  is 21 3/2022  Discontinued niraparib previously. Wants to focus on quality of life.   No evidence of disease on today's exam.    21, some slight elevation from baseline. Does not desire any further interventions (CT, etc) at this time.   She overall feels well and is asymptomatic.   Plan for RTC in 3 months with  or sooner if needed.      Today's visit:  Presents today for follow up.    6/14/2022 with continued upward trend 21>31.          Her oncologic history is:    July 2013: She " was taken to the operating room on July 25, 2013 for preoperative diagnosis consistent with ovarian cancer. 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 At that time she underwent an omentectomy and bilateral salpingo-oophorectomy however she had bulky residual disease along the diaphragms as well as paracolic gutters and pelvis.    She was then treated with 3 cycles of dose dense paclitaxel and carboplatin. A CT scan obtained after this showed the following:    Subcentimeter hepatic lesions, too small to accurately characterize.    Subtle, increased soft tissue attenuation along the periphery of the right hepatic lobe that is nonspecific but is favored to represent a prominent right hemidiaphragm. Peritoneal thickening is thought to be less likely but cannot be entirely excluded   and correlation with prior imaging studies is advised if available.     Large amount of stool throughout entire colon.    L1 vertebral body compression fracture of unknown chronicity.   Small pericardial effusion.    She then received 2 more cycles of dose dense paclitaxel and carboplatin and then received one cycle of standard Taxol and carboplatin.     Dec 2013:  Her last chemotherapy was December 26, 2013.    At the completion of 6 cycles of postoperative Taxol and carboplatin, her  was 7 and her CT scan of the abdomen and pelvis showed no evidence for disease.        Feb 2016,  was 24.May 2016  was 35. June 2016: 33.   May 2016: CT scan abdomen and pelvis was negative.   August 2016:  was 31.  Nov 2016: C A 125 36  Feb 2017:  56. She has had a mildly elevated  in the 30s with negative scanning.   Feb 2017:  was 56. CT scan showed possible intussusception and Pelvis:  2.0 x 0.9 cm soft tissue attenuating focus in the anterior mid pelvis that abuts bowel, and is present on arterial/delayed phases.      PET was done and shows adenopathy as well.   June 2017: Completed 6  cycles of taxol and carboplatin on 6/29/2017 for recurrent ovarian cancer.  Received chemotherapy with Dr. Tana Deutsch.  CT scan after completion of 6 cycles of taxol and carboplatin shows stable right pelvic lymph node index lesion measuring 0.7 cm with some prominent lymph nodes in the jessenia hepatis, unchanged from prior.  There is no new lymphadenopathy identified.   was 8.       Oct 2017:  She tried Niraparib but had a reaction to it on two occasions and stopped this. Started with 3 tablets day one and had SOB and chest pain. Waited several days and restarted with 1 tablet. Had the same symptoms and did not want to resume.      May 2020:  is 36.     Nov 2020: : 45. CT scan AP: 2.5 cm soft tissue mass in the jessenia hepatis does abut the pancreas though favored to be an enlarged node.  There is an additional enlarged node in the right hemipelvis.  Findings concerning for metastatic disease. Started taxol and carboplatin.      Jan 2021: She has completed 3 cycles of chemotherapy. Taxol and carboplatin x 2 and taxol x 1 due to carboplatin reaction at time of C3.   : 15. CT scan AP:  Previously identified soft tissue mass/lymph node in the jessenia hepatitis measures 1.5 cm (axial series 2, image 32), previously measured 2.5 cm. Previously identified soft tissue nodule/lymph node along the course of the right external iliac measures 1.1 cm (axial series 2, image 88), previously measured 1.7 cm (axial series 2, image 100).     Mar 2021: has completed 6 cycles of chemotherapy.  Taxol and carboplatin x 2 and taxol x 4 due to carboplatin reaction at time of C3.: 10. Last chemotherapy was 3/3/21. CT scan AP: jessenia hepatis node 1.3 cm, pelvic node 9 mm. She has a BRCA 2 mutation.Started niraparib 200 mg daily.      Patient with a BRCA 2 mutation.   No available tissue for STRATA testing as her surgery was in 2013.      Colonoscopy: Aug 11, 2016: inflammation and diverticulosis  EGD: Aug 11,  2016: normal      Assessment and plan:      Discussed with the patient and her daughter the slow upward trend of  which may be suggestive of disease recurrence. She remains asymptomatic. Reviewed options of obtaining imaging for disease assessment or continued monitoring. She desires to continue close monitoring for now. Will plan for repeat  in 1 months.         This service was not originating from a related E/M service provided within the previous 7 days nor will  to an E/M service or procedure within the next 24 hours or my soonest available appointment.  Prevailing standard of care was able to be met in this audio-only visit.

## 2022-06-23 ENCOUNTER — TELEPHONE (OUTPATIENT)
Dept: GYNECOLOGIC ONCOLOGY | Facility: CLINIC | Age: 72
End: 2022-06-23
Payer: MEDICARE

## 2022-06-23 ENCOUNTER — OFFICE VISIT (OUTPATIENT)
Dept: GYNECOLOGIC ONCOLOGY | Facility: CLINIC | Age: 72
End: 2022-06-23
Payer: MEDICARE

## 2022-06-23 DIAGNOSIS — C56.9 MALIGNANT NEOPLASM OF OVARY, UNSPECIFIED LATERALITY: Primary | ICD-10-CM

## 2022-06-23 DIAGNOSIS — C48.2 PRIMARY PERITONEAL ADENOCARCINOMA: Primary | ICD-10-CM

## 2022-06-23 PROCEDURE — 99443 PR PHYSICIAN TELEPHONE EVALUATION 21-30 MIN: CPT | Mod: 95,,, | Performed by: OBSTETRICS & GYNECOLOGY

## 2022-06-23 PROCEDURE — 99443 PR PHYSICIAN TELEPHONE EVALUATION 21-30 MIN: ICD-10-PCS | Mod: 95,,, | Performed by: OBSTETRICS & GYNECOLOGY

## 2022-06-29 ENCOUNTER — PATIENT MESSAGE (OUTPATIENT)
Dept: GYNECOLOGIC ONCOLOGY | Facility: CLINIC | Age: 72
End: 2022-06-29
Payer: MEDICARE

## 2022-06-29 ENCOUNTER — TELEPHONE (OUTPATIENT)
Dept: GYNECOLOGIC ONCOLOGY | Facility: CLINIC | Age: 72
End: 2022-06-29
Payer: MEDICARE

## 2022-06-29 NOTE — TELEPHONE ENCOUNTER
"----- Message from Melissa Mott MA sent at 6/29/2022  8:19 AM CDT -----  Regarding: FW: Labs  Contact: Ruiz    ----- Message -----  From: Nba Murray  Sent: 6/29/2022   8:14 AM CDT  To: Rebel Ellis Staff  Subject: Labs                                             Consult/Advisory:       Name Of Caller: Ruiz      Contact Preference?: [phone]       Does patient feel the need to be seen today? No      What is the nature of the call?: Pt need to change lab appt on 07/23/22.  Pt would like like July 19th or 20th at 7am.       Additional Notes:  "Thank you for all that you do for our patients'"        "

## 2022-06-29 NOTE — TELEPHONE ENCOUNTER
Spoke with our patient about her insurance, schedule appointment she voiced understanding of the date, time and location. All questions answered appointment mail. Provider Scheduling Coord.  Gynecologic Oncology MA/PAR /Preceptor Cam Verma

## 2022-06-29 NOTE — TELEPHONE ENCOUNTER
"----- Message from Nba Murray sent at 6/29/2022  8:11 AM CDT -----  Regarding: Labs  Contact: Ruiz  Consult/Advisory:       Name Of Caller: Ruiz      Contact Preference?: [phone]       Does patient feel the need to be seen today? No      What is the nature of the call?: Pt need to change lab appt on 07/23/22.  Pt would like like July 19th or 20th at 7am.       Additional Notes:  "Thank you for all that you do for our patients'"      "

## 2022-07-20 ENCOUNTER — LAB VISIT (OUTPATIENT)
Dept: LAB | Facility: HOSPITAL | Age: 72
End: 2022-07-20
Attending: OBSTETRICS & GYNECOLOGY
Payer: MEDICARE

## 2022-07-20 DIAGNOSIS — C56.9 MALIGNANT NEOPLASM OF OVARY, UNSPECIFIED LATERALITY: ICD-10-CM

## 2022-07-20 LAB — CANCER AG125 SERPL-ACNC: 29 U/ML (ref 0–30)

## 2022-07-20 PROCEDURE — 86304 IMMUNOASSAY TUMOR CA 125: CPT | Performed by: OBSTETRICS & GYNECOLOGY

## 2022-07-20 PROCEDURE — 36415 COLL VENOUS BLD VENIPUNCTURE: CPT | Performed by: OBSTETRICS & GYNECOLOGY

## 2022-07-22 ENCOUNTER — TELEPHONE (OUTPATIENT)
Dept: GYNECOLOGIC ONCOLOGY | Facility: CLINIC | Age: 72
End: 2022-07-22
Payer: MEDICARE

## 2022-07-28 ENCOUNTER — TELEPHONE (OUTPATIENT)
Dept: GYNECOLOGIC ONCOLOGY | Facility: CLINIC | Age: 72
End: 2022-07-28
Payer: MEDICARE

## 2022-08-01 ENCOUNTER — TELEPHONE (OUTPATIENT)
Dept: GYNECOLOGIC ONCOLOGY | Facility: CLINIC | Age: 72
End: 2022-08-01
Payer: MEDICARE

## 2022-11-01 ENCOUNTER — TELEPHONE (OUTPATIENT)
Dept: GYNECOLOGIC ONCOLOGY | Facility: CLINIC | Age: 72
End: 2022-11-01
Payer: MEDICARE

## 2022-11-01 ENCOUNTER — LAB VISIT (OUTPATIENT)
Dept: LAB | Facility: HOSPITAL | Age: 72
End: 2022-11-01
Attending: OBSTETRICS & GYNECOLOGY
Payer: MEDICARE

## 2022-11-01 DIAGNOSIS — C56.9 MALIGNANT NEOPLASM OF OVARY, UNSPECIFIED LATERALITY: ICD-10-CM

## 2022-11-01 DIAGNOSIS — C56.9 MALIGNANT NEOPLASM OF OVARY, UNSPECIFIED LATERALITY: Primary | ICD-10-CM

## 2022-11-01 PROCEDURE — 36415 COLL VENOUS BLD VENIPUNCTURE: CPT | Performed by: OBSTETRICS & GYNECOLOGY

## 2022-11-01 PROCEDURE — 86304 IMMUNOASSAY TUMOR CA 125: CPT | Performed by: OBSTETRICS & GYNECOLOGY

## 2022-11-02 ENCOUNTER — TELEPHONE (OUTPATIENT)
Dept: GYNECOLOGIC ONCOLOGY | Facility: CLINIC | Age: 72
End: 2022-11-02
Payer: MEDICARE

## 2022-11-02 LAB — CANCER AG125 SERPL-ACNC: 40 U/ML (ref 0–30)

## 2022-11-02 NOTE — TELEPHONE ENCOUNTER
"----- Message from Reanna Rueda sent at 11/2/2022  8:06 AM CDT -----  Consult/Advisory:           Name Of Caller: self    Contact Preference?: Anali    What is the nature of the call?: requesting a call back to discuss  results           Additional Notes:  "Thank you for all that you do for our patients'"     "

## 2022-11-04 ENCOUNTER — TELEPHONE (OUTPATIENT)
Dept: GYNECOLOGIC ONCOLOGY | Facility: CLINIC | Age: 72
End: 2022-11-04
Payer: MEDICARE

## 2022-11-04 DIAGNOSIS — C56.9 MALIGNANT NEOPLASM OF OVARY, UNSPECIFIED LATERALITY: Primary | ICD-10-CM

## 2022-11-04 RX ORDER — ALPRAZOLAM 0.25 MG/1
0.25 TABLET ORAL 2 TIMES DAILY PRN
Qty: 90 TABLET | Refills: 1 | Status: SHIPPED | OUTPATIENT
Start: 2022-11-04 | End: 2022-11-07 | Stop reason: SDUPTHER

## 2022-11-04 NOTE — TELEPHONE ENCOUNTER
Discussed fluctuating . Increased to 40. Offered imaging, however she feels well and would like to repeat in 1 month which I think is very reasonable. Xanax also provided for anxiety associated with health.

## 2022-11-07 ENCOUNTER — TELEPHONE (OUTPATIENT)
Dept: GYNECOLOGIC ONCOLOGY | Facility: CLINIC | Age: 72
End: 2022-11-07
Payer: MEDICARE

## 2022-11-07 DIAGNOSIS — C56.9 MALIGNANT NEOPLASM OF OVARY, UNSPECIFIED LATERALITY: ICD-10-CM

## 2022-11-07 RX ORDER — ALPRAZOLAM 0.25 MG/1
0.25 TABLET ORAL 2 TIMES DAILY PRN
Qty: 180 TABLET | Refills: 3 | Status: SHIPPED | OUTPATIENT
Start: 2022-11-07 | End: 2023-07-10

## 2022-11-07 NOTE — TELEPHONE ENCOUNTER
"----- Message from Mignon Vargas sent at 11/7/2022  8:05 AM CST -----  Regarding: speak with office  Contact: dante  Consult/Advisory:           Name Of Caller: dante    Contact Preference?:[phone] (home)            Does patient feel the need to be seen today? No            What is the nature of the call?: speak with office...the patient  ask that nurse reply on my chart.... says she needs a 90 day supply of ALPRAZolam (XANAX) 0.25 MG tablet...please contact [facility].           Additional Notes:  "Thank you for all that you do for our patients'"     "

## 2022-11-07 NOTE — TELEPHONE ENCOUNTER
"----- Message from Yin Mariee sent at 11/7/2022  2:12 PM CST -----  Regarding: Scheduling Request        Patient Status:    Active    Scheduling Appt:    Labs     Location Preference:  [facility] in [place]    Time/Date Preference:   Thursday 12/01, Morning    Relationship to Patient?:   Patient    Contact Preference?:     [phone]    Additional Notes:   Please contact pt by [facility] if they don't answer their phone.      "Thank you for all that you do for our patients"     "

## 2022-12-01 ENCOUNTER — LAB VISIT (OUTPATIENT)
Dept: LAB | Facility: HOSPITAL | Age: 72
End: 2022-12-01
Attending: OBSTETRICS & GYNECOLOGY
Payer: MEDICARE

## 2022-12-01 DIAGNOSIS — C56.9 MALIGNANT NEOPLASM OF OVARY, UNSPECIFIED LATERALITY: ICD-10-CM

## 2022-12-01 PROCEDURE — 86304 IMMUNOASSAY TUMOR CA 125: CPT | Performed by: OBSTETRICS & GYNECOLOGY

## 2022-12-01 PROCEDURE — 36415 COLL VENOUS BLD VENIPUNCTURE: CPT | Performed by: OBSTETRICS & GYNECOLOGY

## 2022-12-02 LAB — CANCER AG125 SERPL-ACNC: 54 U/ML (ref 0–30)

## 2022-12-05 ENCOUNTER — PATIENT MESSAGE (OUTPATIENT)
Dept: GYNECOLOGIC ONCOLOGY | Facility: CLINIC | Age: 72
End: 2022-12-05
Payer: MEDICARE

## 2022-12-06 ENCOUNTER — TELEPHONE (OUTPATIENT)
Dept: GYNECOLOGIC ONCOLOGY | Facility: CLINIC | Age: 72
End: 2022-12-06
Payer: MEDICARE

## 2022-12-06 DIAGNOSIS — C56.9 MALIGNANT NEOPLASM OF OVARY, UNSPECIFIED LATERALITY: Primary | ICD-10-CM

## 2022-12-06 NOTE — TELEPHONE ENCOUNTER
Spoke with our patient and the help desk about her schedule appointment she voiced understanding of the date, time and location. All questions answered  Provider Scheduling Coord.  Gynecologic Oncology MA/PAR /Preceptor Cam Verma

## 2022-12-06 NOTE — TELEPHONE ENCOUNTER
Reviewed elevation of  with patient. She agreed to obtain a CT. Will schedule a virtual visit to review results following the scan. Very difficult for her to travel to [place].

## 2022-12-07 ENCOUNTER — HOSPITAL ENCOUNTER (OUTPATIENT)
Dept: RADIOLOGY | Facility: HOSPITAL | Age: 72
Discharge: HOME | End: 2022-12-07
Attending: OBSTETRICS & GYNECOLOGY
Payer: MEDICARE

## 2022-12-07 ENCOUNTER — TELEPHONE (OUTPATIENT)
Dept: GYNECOLOGIC ONCOLOGY | Facility: CLINIC | Age: 72
End: 2022-12-07
Payer: MEDICARE

## 2022-12-07 DIAGNOSIS — C56.9 MALIGNANT NEOPLASM OF OVARY, UNSPECIFIED LATERALITY: ICD-10-CM

## 2022-12-07 DIAGNOSIS — C56.9 MALIGNANT NEOPLASM OF OVARY, UNSPECIFIED LATERALITY: Primary | ICD-10-CM

## 2022-12-07 PROCEDURE — 25500020 PHARM REV CODE 255: Performed by: OBSTETRICS & GYNECOLOGY

## 2022-12-07 PROCEDURE — 71260 CT THORAX DX C+: CPT | Mod: TC

## 2022-12-07 PROCEDURE — 74177 CT ABD & PELVIS W/CONTRAST: CPT | Mod: TC

## 2022-12-07 RX ADMIN — IOHEXOL 100 ML: 350 INJECTION, SOLUTION INTRAVENOUS at 11:12

## 2022-12-08 ENCOUNTER — TELEPHONE (OUTPATIENT)
Dept: GYNECOLOGIC ONCOLOGY | Facility: CLINIC | Age: 72
End: 2022-12-08
Payer: MEDICARE

## 2022-12-08 ENCOUNTER — OFFICE VISIT (OUTPATIENT)
Dept: GYNECOLOGIC ONCOLOGY | Facility: CLINIC | Age: 72
End: 2022-12-08
Payer: MEDICARE

## 2022-12-08 DIAGNOSIS — Z15.01 BRCA2 GENE MUTATION POSITIVE IN FEMALE: ICD-10-CM

## 2022-12-08 DIAGNOSIS — Z15.02 BRCA2 GENE MUTATION POSITIVE IN FEMALE: ICD-10-CM

## 2022-12-08 DIAGNOSIS — C48.2 PRIMARY PERITONEAL ADENOCARCINOMA: Primary | ICD-10-CM

## 2022-12-08 DIAGNOSIS — Z15.09 BRCA2 GENE MUTATION POSITIVE IN FEMALE: ICD-10-CM

## 2022-12-08 PROCEDURE — 99214 PR OFFICE/OUTPT VISIT, EST, LEVL IV, 30-39 MIN: ICD-10-PCS | Mod: 95,,, | Performed by: OBSTETRICS & GYNECOLOGY

## 2022-12-08 PROCEDURE — 99214 OFFICE O/P EST MOD 30 MIN: CPT | Mod: 95,,, | Performed by: OBSTETRICS & GYNECOLOGY

## 2022-12-08 NOTE — TELEPHONE ENCOUNTER
Spoke with patient. Reviewed CT progressive disease. Which correlates with elevation of . She asked me to call her daughter Susana to help explain.     ----- Message from Melissa Mott MA sent at 12/7/2022  4:23 PM CST -----    ----- Message -----  From: Fernando Henry  Sent: 12/7/2022   4:17 PM CST  To: [facility] Staff    Pt is returning Dr. Luque's call. Pt can be reached at [phone]

## 2022-12-09 NOTE — PROGRESS NOTES
"Subjective:      Patient ID: Murlene Mary Haase is a 72 y.o. female.    Chief Complaint: No chief complaint on file.  The patient location is: Home  The chief complaint leading to consultation is: results review    Visit type: audiovisual    Face to Face time with patient: 20  30 minutes of total time spent on the encounter, which includes face to face time and non-face to face time preparing to see the patient (eg, review of tests), Obtaining and/or reviewing separately obtained history, Documenting clinical information in the electronic or other health record, Independently interpreting results (not separately reported) and communicating results to the patient/family/caregiver, or Care coordination (not separately reported).         Each patient to whom he or she provides medical services by telemedicine is:  (1) informed of the relationship between the physician and patient and the respective role of any other health care provider with respect to management of the patient; and (2) notified that he or she may decline to receive medical services by telemedicine and may withdraw from such care at any time.    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 She was on niraparib maintenance 200 mg daily. Started in March 2021. In Sep 2021 she asked to stop it because of worsening fatigue and GI upset.      Sep 2021: : 11. Previously 8. She is feeling better off niraparib. Also saw a GI doctor and started on glutamine and bentyl. More active since off niraparib. Also bloating and gas have resolved. Back to "running".      Sees Dr. Tana Deutsch her medical oncologist. But reports having not seen her in a while.       Last visit 3/2022:  is 21 3/2022  Discontinued niraparib previously. Wants to focus on quality of life.   No evidence of disease on today's exam.    21, some slight elevation from baseline. Does not desire any further interventions (CT, etc) at " this time.   She overall feels well and is asymptomatic.   Plan for RTC in 3 months with  or sooner if needed.       Today's visit:  Presents today for follow up.    6/14/2022 with continued upward trend 21>31. She desired to continue to follow the trend.    29> 40> 54 12/1/2022  CT 12/7/2022 shows progressive disease  - 5.6 x 2.9 x 3.7 cm heterogeneously enhancing mass in the jessenia hepatis, enlarged from previous exams and consistent with metastatic katia disease.    - 3.1 x 2.0 x 3.1 cm similar appearing ovoid mass within the right iliac chain, also increased.  Last platinum 3/2021, previous carbo allergy.         Her oncologic history is:    July 2013: She was taken to the operating room on July 25, 2013 for preoperative diagnosis consistent with ovarian cancer. 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 At that time she underwent an omentectomy and bilateral salpingo-oophorectomy however she had bulky residual disease along the diaphragms as well as paracolic gutters and pelvis.    She was then treated with 3 cycles of dose dense paclitaxel and carboplatin. A CT scan obtained after this showed the following:    Subcentimeter hepatic lesions, too small to accurately characterize.    Subtle, increased soft tissue attenuation along the periphery of the right hepatic lobe that is nonspecific but is favored to represent a prominent right hemidiaphragm. Peritoneal thickening is thought to be less likely but cannot be entirely excluded   and correlation with prior imaging studies is advised if available.     Large amount of stool throughout entire colon.    L1 vertebral body compression fracture of unknown chronicity.   Small pericardial effusion.    She then received 2 more cycles of dose dense paclitaxel and carboplatin and then received one cycle of standard Taxol and carboplatin.     Dec 2013:  Her last chemotherapy was December 26, 2013.    At the completion of 6 cycles  of postoperative Taxol and carboplatin, her  was 7 and her CT scan of the abdomen and pelvis showed no evidence for disease.        Feb 2016,  was 24.May 2016  was 35. June 2016: 33.   May 2016: CT scan abdomen and pelvis was negative.   August 2016:  was 31.  Nov 2016: C A 125 36  Feb 2017:  56. She has had a mildly elevated  in the 30s with negative scanning.   Feb 2017:  was 56. CT scan showed possible intussusception and Pelvis:  2.0 x 0.9 cm soft tissue attenuating focus in the anterior mid pelvis that abuts bowel, and is present on arterial/delayed phases.      PET was done and shows adenopathy as well.   June 2017: Completed 6 cycles of taxol and carboplatin on 6/29/2017 for recurrent ovarian cancer.  Received chemotherapy with Dr. Tana Deutsch.  CT scan after completion of 6 cycles of taxol and carboplatin shows stable right pelvic lymph node index lesion measuring 0.7 cm with some prominent lymph nodes in the jessenia hepatis, unchanged from prior.  There is no new lymphadenopathy identified.   was 8.       Oct 2017:  She tried Niraparib but had a reaction to it on two occasions and stopped this. Started with 3 tablets day one and had SOB and chest pain. Waited several days and restarted with 1 tablet. Had the same symptoms and did not want to resume.      May 2020:  is 36.     Nov 2020: : 45. CT scan AP: 2.5 cm soft tissue mass in the jessenia hepatis does abut the pancreas though favored to be an enlarged node.  There is an additional enlarged node in the right hemipelvis.  Findings concerning for metastatic disease. Started taxol and carboplatin.      Jan 2021: She has completed 3 cycles of chemotherapy. Taxol and carboplatin x 2 and taxol x 1 due to carboplatin reaction at time of C3.   : 15. CT scan AP:  Previously identified soft tissue mass/lymph node in the jessenia hepatitis measures 1.5 cm (axial series 2, image 32), previously measured 2.5  cm. Previously identified soft tissue nodule/lymph node along the course of the right external iliac measures 1.1 cm (axial series 2, image 88), previously measured 1.7 cm (axial series 2, image 100).     Mar 2021: has completed 6 cycles of chemotherapy.  Taxol and carboplatin x 2 and taxol x 4 due to carboplatin reaction at time of C3.: 10. Last chemotherapy was 3/3/21. CT scan AP: jessenia hepatis node 1.3 cm, pelvic node 9 mm. She has a BRCA 2 mutation.Started niraparib 200 mg daily.      Patient with a BRCA 2 mutation.   No available tissue for STRATA testing as her surgery was in 2013.       Colonoscopy: Aug 11, 2016: inflammation and diverticulosis  EGD: Aug 11, 2016: normal   Review of Systems   Constitutional:  Negative for appetite change, chills, fatigue and fever.   HENT:  Negative for mouth sores.    Respiratory:  Negative for cough and shortness of breath.    Cardiovascular:  Negative for leg swelling.   Gastrointestinal:  Negative for abdominal pain, blood in stool, constipation and diarrhea.   Endocrine: Negative for cold intolerance.   Genitourinary:  Negative for dysuria and vaginal bleeding.   Musculoskeletal:  Negative for myalgias.   Skin:  Negative for rash.   Allergic/Immunologic: Negative.    Neurological:  Negative for weakness and numbness.   Hematological:  Negative for adenopathy. Does not bruise/bleed easily.   Psychiatric/Behavioral:  Negative for confusion.      Objective:   Physical Exam:   Constitutional: She is oriented to person, place, and time. She appears well-developed and well-nourished. No distress.    HENT:   Head: Normocephalic and atraumatic.    Eyes: EOM are normal.      Pulmonary/Chest: Effort normal.                  Musculoskeletal: Moves all extremeties.       Neurological: She is alert and oriented to person, place, and time.    Skin: Skin is dry. No pallor.    Psychiatric: She has a normal mood and affect. Her behavior is normal. Judgment and thought content  normal.     Assessment:     1. Primary peritoneal adenocarcinoma    2. BRCA2 gene mutation positive in female        Plan:   No orders of the defined types were placed in this encounter.    Long discussion with the patient and her daughter regarding the constellation of findings.  Upward trend of  and CT scan which shows progressive disease.  She is still platinum sensitive with last platinum chemotherapy being March 2021.  She did have a carbo reaction during that session of chemotherapy.  We discussed resuming chemotherapy given progressive disease.  Ideally platinum-based giving platinum sensitive and we discussed carboplatin desensitization protocols which would need to be done here in [place] versus proceeding with alternative chemotherapy options such as Taxol or doxil +/- Avastin. She would like to proceed with carboplatin desensitization. 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 We discussed carbo/doxil/avastin.     I spent approximately 30 minutes reviewing the available records and evaluating the patient, out of which over 50% of the time was spent face to face with the patient in counseling and coordinating this patient's care.

## 2022-12-19 ENCOUNTER — TELEPHONE (OUTPATIENT)
Dept: GYNECOLOGIC ONCOLOGY | Facility: CLINIC | Age: 72
End: 2022-12-19
Payer: MEDICARE

## 2022-12-19 ENCOUNTER — PATIENT MESSAGE (OUTPATIENT)
Dept: GYNECOLOGIC ONCOLOGY | Facility: CLINIC | Age: 72
End: 2022-12-19
Payer: MEDICARE

## 2022-12-19 DIAGNOSIS — Z15.02 BRCA2 GENE MUTATION POSITIVE IN FEMALE: Primary | ICD-10-CM

## 2022-12-19 DIAGNOSIS — C48.2 PRIMARY PERITONEAL ADENOCARCINOMA: ICD-10-CM

## 2022-12-19 DIAGNOSIS — Z15.09 BRCA2 GENE MUTATION POSITIVE IN FEMALE: Primary | ICD-10-CM

## 2022-12-19 DIAGNOSIS — Z15.01 BRCA2 GENE MUTATION POSITIVE IN FEMALE: Primary | ICD-10-CM

## 2022-12-19 NOTE — TELEPHONE ENCOUNTER
----- Message from Melissa Mott MA sent at 12/19/2022  9:38 AM CST -----  Regarding: appointment  Can you please schedule patient on Tuesday 1/3 at 11 am with  to sign chemo consents, you will have to double book the spot.

## 2023-01-03 ENCOUNTER — CLINICAL SUPPORT (OUTPATIENT)
Dept: CARDIOLOGY | Facility: HOSPITAL | Age: 73
End: 2023-01-03
Attending: OBSTETRICS & GYNECOLOGY
Payer: MEDICARE

## 2023-01-03 ENCOUNTER — OFFICE VISIT (OUTPATIENT)
Dept: GYNECOLOGIC ONCOLOGY | Facility: CLINIC | Age: 73
End: 2023-01-03
Payer: MEDICARE

## 2023-01-03 ENCOUNTER — LAB VISIT (OUTPATIENT)
Dept: LAB | Facility: HOSPITAL | Age: 73
End: 2023-01-03
Attending: OBSTETRICS & GYNECOLOGY
Payer: MEDICARE

## 2023-01-03 VITALS
DIASTOLIC BLOOD PRESSURE: 76 MMHG | HEART RATE: 72 BPM | BODY MASS INDEX: 19.81 KG/M2 | WEIGHT: 116 LBS | SYSTOLIC BLOOD PRESSURE: 145 MMHG | HEIGHT: 64 IN

## 2023-01-03 DIAGNOSIS — Z15.09 BRCA2 GENE MUTATION POSITIVE IN FEMALE: ICD-10-CM

## 2023-01-03 DIAGNOSIS — Z15.01 BRCA2 GENE MUTATION POSITIVE IN FEMALE: ICD-10-CM

## 2023-01-03 DIAGNOSIS — Z15.02 BRCA2 GENE MUTATION POSITIVE IN FEMALE: ICD-10-CM

## 2023-01-03 DIAGNOSIS — Z01.818 EXAMINATION PRIOR TO CHEMOTHERAPY: ICD-10-CM

## 2023-01-03 DIAGNOSIS — C56.9 MALIGNANT NEOPLASM OF OVARY, UNSPECIFIED LATERALITY: Primary | ICD-10-CM

## 2023-01-03 DIAGNOSIS — C48.2 PRIMARY PERITONEAL ADENOCARCINOMA: ICD-10-CM

## 2023-01-03 DIAGNOSIS — C56.9 MALIGNANT NEOPLASM OF OVARY, UNSPECIFIED LATERALITY: ICD-10-CM

## 2023-01-03 LAB
ALBUMIN SERPL BCP-MCNC: 3.7 G/DL (ref 3.5–5.2)
ALP SERPL-CCNC: 76 U/L (ref 55–135)
ALT SERPL W/O P-5'-P-CCNC: 18 U/L (ref 10–44)
ANION GAP SERPL CALC-SCNC: 5 MMOL/L (ref 8–16)
AORTIC ROOT ANNULUS: 3.22 CM
AORTIC VALVE CUSP SEPERATION: 1.25 CM
AST SERPL-CCNC: 27 U/L (ref 10–40)
AV INDEX (PROSTH): 0.99
AV MEAN GRADIENT: 3 MMHG
AV PEAK GRADIENT: 7 MMHG
AV REGURGITATION PRESSURE HALF TIME: 527.84 MS
AV VALVE AREA: 2.98 CM2
AV VELOCITY RATIO: 0.83
BILIRUB SERPL-MCNC: 0.3 MG/DL (ref 0.1–1)
BSA FOR ECHO PROCEDURE: 1.54 M2
BUN SERPL-MCNC: 27 MG/DL (ref 8–23)
CALCIUM SERPL-MCNC: 10.8 MG/DL (ref 8.7–10.5)
CANCER AG125 SERPL-ACNC: 48 U/ML (ref 0–30)
CHLORIDE SERPL-SCNC: 104 MMOL/L (ref 95–110)
CO2 SERPL-SCNC: 27 MMOL/L (ref 23–29)
CREAT SERPL-MCNC: 0.8 MG/DL (ref 0.5–1.4)
CV ECHO LV RWT: 0.58 CM
DOP CALC AO PEAK VEL: 1.28 M/S
DOP CALC AO VTI: 24.4 CM
DOP CALC LVOT AREA: 3 CM2
DOP CALC LVOT DIAMETER: 1.96 CM
DOP CALC LVOT PEAK VEL: 1.06 M/S
DOP CALC LVOT STROKE VOLUME: 72.68 CM3
DOP CALCLVOT PEAK VEL VTI: 24.1 CM
E WAVE DECELERATION TIME: 210.31 MSEC
E/A RATIO: 0.91
ECHO LV POSTERIOR WALL: 1.04 CM (ref 0.6–1.1)
EJECTION FRACTION: 75 %
ERYTHROCYTE [DISTWIDTH] IN BLOOD BY AUTOMATED COUNT: 15.1 % (ref 11.5–14.5)
EST. GFR  (NO RACE VARIABLE): >60 ML/MIN/1.73 M^2
FRACTIONAL SHORTENING: 43 % (ref 28–44)
GLUCOSE SERPL-MCNC: 88 MG/DL (ref 70–110)
HCT VFR BLD AUTO: 36 % (ref 37–48.5)
HGB BLD-MCNC: 11.7 G/DL (ref 12–16)
IMM GRANULOCYTES # BLD AUTO: 0.07 K/UL (ref 0–0.04)
INTERVENTRICULAR SEPTUM: 0.85 CM (ref 0.6–1.1)
IVC DIAMETER: 1.66 CM
IVRT: 110.73 MSEC
LA MAJOR: 5.35 CM
LEFT ATRIUM SIZE: 3.8 CM
LEFT INTERNAL DIMENSION IN SYSTOLE: 2.04 CM (ref 2.1–4)
LEFT VENTRICLE DIASTOLIC VOLUME INDEX: 34.48 ML/M2
LEFT VENTRICLE DIASTOLIC VOLUME: 53.45 ML
LEFT VENTRICLE MASS INDEX: 63 G/M2
LEFT VENTRICLE SYSTOLIC VOLUME INDEX: 8.6 ML/M2
LEFT VENTRICLE SYSTOLIC VOLUME: 13.33 ML
LEFT VENTRICULAR INTERNAL DIMENSION IN DIASTOLE: 3.57 CM (ref 3.5–6)
LEFT VENTRICULAR MASS: 98.17 G
LVOT MG: 2.26 MMHG
LVOT MV: 0.71 CM/S
MAGNESIUM SERPL-MCNC: 2.4 MG/DL (ref 1.6–2.6)
MCH RBC QN AUTO: 32.3 PG (ref 27–31)
MCHC RBC AUTO-ENTMCNC: 32.5 G/DL (ref 32–36)
MCV RBC AUTO: 99 FL (ref 82–98)
MV PEAK A VEL: 1.07 M/S
MV PEAK E VEL: 0.97 M/S
MV STENOSIS PRESSURE HALF TIME: 60.99 MS
MV VALVE AREA P 1/2 METHOD: 3.61 CM2
NEUTROPHILS # BLD AUTO: 3.8 K/UL (ref 1.8–7.7)
PISA AR MAX VEL: 4.83 M/S
PISA TR MAX VEL: 2.54 M/S
PLATELET # BLD AUTO: 288 K/UL (ref 150–450)
PMV BLD AUTO: 9.7 FL (ref 9.2–12.9)
POTASSIUM SERPL-SCNC: 4.5 MMOL/L (ref 3.5–5.1)
PROT SERPL-MCNC: 7.4 G/DL (ref 6–8.4)
PV MV: 0.71 M/S
PV PEAK VELOCITY: 1.02 CM/S
RA MAJOR: 5 CM
RA PRESSURE: 3 MMHG
RA WIDTH: 2.6 CM
RBC # BLD AUTO: 3.62 M/UL (ref 4–5.4)
RIGHT VENTRICULAR END-DIASTOLIC DIMENSION: 1.98 CM
SODIUM SERPL-SCNC: 136 MMOL/L (ref 136–145)
TR MAX PG: 26 MMHG
TV PEAK E VEL: 0.5 M/S
TV REST PULMONARY ARTERY PRESSURE: 29 MMHG
WBC # BLD AUTO: 6.76 K/UL (ref 3.9–12.7)

## 2023-01-03 PROCEDURE — 85027 COMPLETE CBC AUTOMATED: CPT | Performed by: OBSTETRICS & GYNECOLOGY

## 2023-01-03 PROCEDURE — 99999 PR PBB SHADOW E&M-EST. PATIENT-LVL III: ICD-10-PCS | Mod: PBBFAC,,, | Performed by: OBSTETRICS & GYNECOLOGY

## 2023-01-03 PROCEDURE — 80053 COMPREHEN METABOLIC PANEL: CPT | Performed by: OBSTETRICS & GYNECOLOGY

## 2023-01-03 PROCEDURE — 99215 OFFICE O/P EST HI 40 MIN: CPT | Mod: S$PBB,,, | Performed by: OBSTETRICS & GYNECOLOGY

## 2023-01-03 PROCEDURE — 99999 PR PBB SHADOW E&M-EST. PATIENT-LVL III: CPT | Mod: PBBFAC,,, | Performed by: OBSTETRICS & GYNECOLOGY

## 2023-01-03 PROCEDURE — 99215 PR OFFICE/OUTPT VISIT, EST, LEVL V, 40-54 MIN: ICD-10-PCS | Mod: S$PBB,,, | Performed by: OBSTETRICS & GYNECOLOGY

## 2023-01-03 PROCEDURE — 99213 OFFICE O/P EST LOW 20 MIN: CPT | Mod: PBBFAC | Performed by: OBSTETRICS & GYNECOLOGY

## 2023-01-03 PROCEDURE — 83735 ASSAY OF MAGNESIUM: CPT | Performed by: OBSTETRICS & GYNECOLOGY

## 2023-01-03 PROCEDURE — 93306 TTE W/DOPPLER COMPLETE: CPT

## 2023-01-03 PROCEDURE — 36415 COLL VENOUS BLD VENIPUNCTURE: CPT | Performed by: OBSTETRICS & GYNECOLOGY

## 2023-01-03 PROCEDURE — 86304 IMMUNOASSAY TUMOR CA 125: CPT | Performed by: OBSTETRICS & GYNECOLOGY

## 2023-01-03 RX ORDER — ONDANSETRON 8 MG/1
8 TABLET, ORALLY DISINTEGRATING ORAL EVERY 8 HOURS PRN
Qty: 60 TABLET | Refills: 0 | Status: SHIPPED | OUTPATIENT
Start: 2023-01-03

## 2023-01-03 RX ORDER — NIRMATRELVIR AND RITONAVIR 300-100 MG
KIT ORAL
COMMUNITY
Start: 2022-09-05 | End: 2023-07-19

## 2023-01-03 RX ORDER — ROPINIROLE 0.5 MG/1
0.5 TABLET, FILM COATED ORAL DAILY PRN
Qty: 90 TABLET | Refills: 0 | Status: SHIPPED | OUTPATIENT
Start: 2023-01-03 | End: 2023-04-14

## 2023-01-03 RX ORDER — LORATADINE 10 MG/1
10 TABLET ORAL
COMMUNITY
Start: 2022-10-19 | End: 2023-07-19

## 2023-01-03 RX ORDER — MIRTAZAPINE 30 MG/1
30 TABLET, FILM COATED ORAL NIGHTLY
COMMUNITY
Start: 2022-10-26 | End: 2023-01-03

## 2023-01-03 RX ORDER — MIRTAZAPINE 30 MG/1
30 TABLET, FILM COATED ORAL NIGHTLY
Qty: 90 TABLET | Refills: 3 | Status: SHIPPED | OUTPATIENT
Start: 2023-01-03 | End: 2023-07-19

## 2023-01-03 NOTE — PROGRESS NOTES
"Subjective:      Patient ID: Murlene Mary Haase is a 72 y.o. female.    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 She was on niraparib maintenance 200 mg daily. Started in March 2021. In Sep 2021 she asked to stop it because of worsening fatigue and GI upset.      Sep 2021: : 11. Previously 8. She is feeling better off niraparib. Also saw a GI doctor and started on glutamine and bentyl. More active since off niraparib. Also bloating and gas have resolved. Back to "running".      Sees Dr. Tana Deutsch her medical oncologist. But reports having not seen her in a while.       Last visit 3/2022:  is 21 3/2022  Discontinued niraparib previously. Wants to focus on quality of life.   No evidence of disease on today's exam.    21, some slight elevation from baseline. Does not desire any further interventions (CT, etc) at this time.   She overall feels well and is asymptomatic.   Plan for RTC in 3 months with  or sooner if needed.         6/14/2022 with continued upward trend 21>31. She desired to continue to follow the trend.    29> 40> 54 12/1/2022  CT 12/7/2022 shows progressive disease  - 5.6 x 2.9 x 3.7 cm heterogeneously enhancing mass in the jessenia hepatis, enlarged from previous exams and consistent with metastatic katia disease.    - 3.1 x 2.0 x 3.1 cm similar appearing ovoid mass within the right iliac chain, also increased.  Last platinum 3/2021, previous carbo allergy.    Today's visit:  Last platinum chemotherapy being March 2021.  She did have a carbo reaction during that session of chemotherapy.  Ideally would like to use platinum-based given platinum sensitive and we discussed carboplatin desensitization protocols. Discussed carbo + Taxol or doxil +/- Avastin. 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 Significant IBS/GI issues and after discussions of risks and benefits of avastin will " not pursue at this time.     Carbo desensitization AUC4/Doxil 30mg/m2   Presents today consideration of C1.  Labs reviewed and appropriate for treatment.   Echo scheduled for this afternoon.    Supportive meds requip, zofran, remeron for sleep have worked well for her in the past.   Discussed supportive care for constipation and diarrhea associated with chemotherapy.        ____________________________    Her oncologic history is:    July 2013: She was taken to the operating room on July 25, 2013 for preoperative diagnosis consistent with ovarian cancer. 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 At that time she underwent an omentectomy and bilateral salpingo-oophorectomy however she had bulky residual disease along the diaphragms as well as paracolic gutters and pelvis.    She was then treated with 3 cycles of dose dense paclitaxel and carboplatin. A CT scan obtained after this showed the following:    Subcentimeter hepatic lesions, too small to accurately characterize.    Subtle, increased soft tissue attenuation along the periphery of the right hepatic lobe that is nonspecific but is favored to represent a prominent right hemidiaphragm. Peritoneal thickening is thought to be less likely but cannot be entirely excluded   and correlation with prior imaging studies is advised if available.     Large amount of stool throughout entire colon.    L1 vertebral body compression fracture of unknown chronicity.   Small pericardial effusion.    She then received 2 more cycles of dose dense paclitaxel and carboplatin and then received one cycle of standard Taxol and carboplatin.     Dec 2013:  Her last chemotherapy was December 26, 2013.    At the completion of 6 cycles of postoperative Taxol and carboplatin, her  was 7 and her CT scan of the abdomen and pelvis showed no evidence for disease.        Feb 2016,  was 24.May 2016  was 35. June 2016: 33.   May 2016: CT scan abdomen  and pelvis was negative.   August 2016:  was 31.  Nov 2016: C A 125 36  Feb 2017:  56. She has had a mildly elevated  in the 30s with negative scanning.   Feb 2017:  was 56. CT scan showed possible intussusception and Pelvis:  2.0 x 0.9 cm soft tissue attenuating focus in the anterior mid pelvis that abuts bowel, and is present on arterial/delayed phases.      PET was done and shows adenopathy as well.   June 2017: Completed 6 cycles of taxol and carboplatin on 6/29/2017 for recurrent ovarian cancer.  Received chemotherapy with Dr. Tana Deutsch.  CT scan after completion of 6 cycles of taxol and carboplatin shows stable right pelvic lymph node index lesion measuring 0.7 cm with some prominent lymph nodes in the jessenia hepatis, unchanged from prior.  There is no new lymphadenopathy identified.   was 8.       Oct 2017:  She tried Niraparib but had a reaction to it on two occasions and stopped this. Started with 3 tablets day one and had SOB and chest pain. Waited several days and restarted with 1 tablet. Had the same symptoms and did not want to resume.      May 2020:  is 36.     Nov 2020: : 45. CT scan AP: 2.5 cm soft tissue mass in the jessenia hepatis does abut the pancreas though favored to be an enlarged node.  There is an additional enlarged node in the right hemipelvis.  Findings concerning for metastatic disease. Started taxol and carboplatin.      Jan 2021: She has completed 3 cycles of chemotherapy. Taxol and carboplatin x 2 and taxol x 1 due to carboplatin reaction at time of C3.   : 15. CT scan AP:  Previously identified soft tissue mass/lymph node in the jessenia hepatitis measures 1.5 cm (axial series 2, image 32), previously measured 2.5 cm. Previously identified soft tissue nodule/lymph node along the course of the right external iliac measures 1.1 cm (axial series 2, image 88), previously measured 1.7 cm (axial series 2, image 100).     Mar 2021: has completed  6 cycles of chemotherapy.  Taxol and carboplatin x 2 and taxol x 4 due to carboplatin reaction at time of C3.: 10. Last chemotherapy was 3/3/21. CT scan AP: jessenia hepatis node 1.3 cm, pelvic node 9 mm. She has a BRCA 2 mutation.Started niraparib 200 mg daily.      Patient with a BRCA 2 mutation.   No available tissue for STRATA testing as her surgery was in 2013.       Colonoscopy: Aug 11, 2016: inflammation and diverticulosis  EGD: Aug 11, 2016: normal   Review of Systems   Constitutional:  Negative for appetite change, chills, fatigue and fever.   HENT:  Negative for mouth sores.    Respiratory:  Negative for cough and shortness of breath.    Cardiovascular:  Negative for leg swelling.   Gastrointestinal:  Negative for abdominal pain, blood in stool, constipation and diarrhea.   Endocrine: Negative for cold intolerance.   Genitourinary:  Negative for dysuria and vaginal bleeding.   Musculoskeletal:  Negative for myalgias.   Skin:  Negative for rash.   Allergic/Immunologic: Negative.    Neurological:  Negative for weakness and numbness.   Hematological:  Negative for adenopathy. Does not bruise/bleed easily.   Psychiatric/Behavioral:  Negative for confusion.      Objective:   Physical Exam:   Constitutional: She is oriented to person, place, and time. She appears well-developed and well-nourished.    HENT:   Head: Normocephalic and atraumatic.    Eyes: Pupils are equal, round, and reactive to light. EOM are normal.    Neck: No thyromegaly present.    Cardiovascular:  Normal rate, regular rhythm and intact distal pulses.             Pulmonary/Chest: Effort normal and breath sounds normal. No respiratory distress. She has no wheezes.        Abdominal: Soft. Bowel sounds are normal. She exhibits no distension and no mass. There is no abdominal tenderness.     Genitourinary:    Vagina and rectum normal.      Pelvic exam was performed with patient supine.   There is no lesion on the right labia. There is no lesion  on the left labia. Right adnexum displays no mass. Left adnexum displays no mass. Vaginal cuff normal.  Cervix is absent.Uterus is absent.           Musculoskeletal: Normal range of motion and moves all extremeties.      Lymphadenopathy:     She has no cervical adenopathy. No inguinal adenopathy noted on the right or left side.        Right: No supraclavicular adenopathy present.        Left: No supraclavicular adenopathy present.    Neurological: She is alert and oriented to person, place, and time.    Skin: Skin is warm and dry. No rash noted.    Psychiatric: She has a normal mood and affect.     Assessment:     1. Malignant neoplasm of ovary, unspecified laterality    2. Examination prior to chemotherapy        Plan:     Orders Placed This Encounter   Procedures    Echo Saline Bubble? No     Okay to treat C1 as above  RTC 3 weeks prechemo or sooner if needed    I spent approximately 45 minutes reviewing the available records and evaluating the patient, out of which over 50% of the time was spent face to face with the patient in counseling and coordinating this patient's care.

## 2023-01-04 RX ORDER — SODIUM CHLORIDE 0.9 % (FLUSH) 0.9 %
10 SYRINGE (ML) INJECTION
Status: CANCELLED | OUTPATIENT
Start: 2023-01-05

## 2023-01-04 RX ORDER — DIPHENHYDRAMINE HYDROCHLORIDE 50 MG/ML
50 INJECTION INTRAMUSCULAR; INTRAVENOUS
Status: CANCELLED | OUTPATIENT
Start: 2023-01-05

## 2023-01-04 RX ORDER — HEPARIN 100 UNIT/ML
500 SYRINGE INTRAVENOUS
Status: CANCELLED | OUTPATIENT
Start: 2023-01-05

## 2023-01-04 RX ORDER — EPINEPHRINE 0.1 MG/ML
0.5 INJECTION INTRAVENOUS
Status: CANCELLED | OUTPATIENT
Start: 2023-01-05

## 2023-01-05 ENCOUNTER — INFUSION (OUTPATIENT)
Dept: INFUSION THERAPY | Facility: HOSPITAL | Age: 73
End: 2023-01-05
Payer: MEDICARE

## 2023-01-05 VITALS
DIASTOLIC BLOOD PRESSURE: 91 MMHG | WEIGHT: 119 LBS | TEMPERATURE: 98 F | HEART RATE: 83 BPM | RESPIRATION RATE: 18 BRPM | SYSTOLIC BLOOD PRESSURE: 128 MMHG | HEIGHT: 64 IN | BODY MASS INDEX: 20.32 KG/M2

## 2023-01-05 DIAGNOSIS — R14.0 BLOATING SYMPTOM: Primary | ICD-10-CM

## 2023-01-05 DIAGNOSIS — C56.9 MALIGNANT NEOPLASM OF OVARY, UNSPECIFIED LATERALITY: Primary | ICD-10-CM

## 2023-01-05 DIAGNOSIS — Z15.01 BRCA2 GENE MUTATION POSITIVE IN FEMALE: ICD-10-CM

## 2023-01-05 DIAGNOSIS — Z15.02 BRCA2 GENE MUTATION POSITIVE IN FEMALE: ICD-10-CM

## 2023-01-05 DIAGNOSIS — Z15.09 BRCA2 GENE MUTATION POSITIVE IN FEMALE: ICD-10-CM

## 2023-01-05 DIAGNOSIS — C48.2 PRIMARY PERITONEAL ADENOCARCINOMA: ICD-10-CM

## 2023-01-05 PROCEDURE — 25000003 PHARM REV CODE 250: Performed by: OBSTETRICS & GYNECOLOGY

## 2023-01-05 PROCEDURE — 63600175 PHARM REV CODE 636 W HCPCS: Performed by: OBSTETRICS & GYNECOLOGY

## 2023-01-05 PROCEDURE — 96413 CHEMO IV INFUSION 1 HR: CPT

## 2023-01-05 PROCEDURE — A4216 STERILE WATER/SALINE, 10 ML: HCPCS | Performed by: OBSTETRICS & GYNECOLOGY

## 2023-01-05 PROCEDURE — 96375 TX/PRO/DX INJ NEW DRUG ADDON: CPT

## 2023-01-05 PROCEDURE — 96367 TX/PROPH/DG ADDL SEQ IV INF: CPT

## 2023-01-05 PROCEDURE — 96415 CHEMO IV INFUSION ADDL HR: CPT

## 2023-01-05 PROCEDURE — 96417 CHEMO IV INFUS EACH ADDL SEQ: CPT

## 2023-01-05 RX ORDER — DIPHENHYDRAMINE HYDROCHLORIDE 50 MG/ML
50 INJECTION INTRAMUSCULAR; INTRAVENOUS
Status: DISCONTINUED | OUTPATIENT
Start: 2023-01-05 | End: 2023-01-05 | Stop reason: HOSPADM

## 2023-01-05 RX ORDER — MONTELUKAST SODIUM 10 MG/1
10 TABLET ORAL ONCE
Qty: 30 TABLET | Refills: 0 | Status: SHIPPED | OUTPATIENT
Start: 2023-01-05 | End: 2023-02-04

## 2023-01-05 RX ORDER — MONTELUKAST SODIUM 10 MG/1
10 TABLET ORAL DAILY
Status: DISCONTINUED | OUTPATIENT
Start: 2023-01-05 | End: 2023-01-05 | Stop reason: HOSPADM

## 2023-01-05 RX ORDER — EPINEPHRINE 0.3 MG/.3ML
INJECTION SUBCUTANEOUS
Status: DISCONTINUED | OUTPATIENT
Start: 2023-01-05 | End: 2023-01-05 | Stop reason: HOSPADM

## 2023-01-05 RX ORDER — SODIUM CHLORIDE 0.9 % (FLUSH) 0.9 %
10 SYRINGE (ML) INJECTION
Status: DISCONTINUED | OUTPATIENT
Start: 2023-01-05 | End: 2023-01-05 | Stop reason: HOSPADM

## 2023-01-05 RX ORDER — HEPARIN 100 UNIT/ML
500 SYRINGE INTRAVENOUS
Status: DISCONTINUED | OUTPATIENT
Start: 2023-01-05 | End: 2023-01-05 | Stop reason: HOSPADM

## 2023-01-05 RX ADMIN — HEPARIN 500 UNITS: 100 SYRINGE at 04:01

## 2023-01-05 RX ADMIN — DIPHENHYDRAMINE HYDROCHLORIDE 50 MG: 50 INJECTION, SOLUTION INTRAMUSCULAR; INTRAVENOUS at 02:01

## 2023-01-05 RX ADMIN — DEXAMETHASONE SODIUM PHOSPHATE 20 MG: 4 INJECTION, SOLUTION INTRA-ARTICULAR; INTRALESIONAL; INTRAMUSCULAR; INTRAVENOUS; SOFT TISSUE at 02:01

## 2023-01-05 RX ADMIN — CARBOPLATIN 275.6 MG: 10 INJECTION, SOLUTION INTRAVENOUS at 02:01

## 2023-01-05 RX ADMIN — DOXORUBICIN HYDROCHLORIDE 48 MG: 2 INJECTABLE, LIPOSOMAL INTRAVENOUS at 03:01

## 2023-01-05 RX ADMIN — MONTELUKAST 10 MG: 10 TABLET, FILM COATED ORAL at 08:01

## 2023-01-05 RX ADMIN — DEXAMETHASONE SODIUM PHOSPHATE 0.25 MG: 4 INJECTION, SOLUTION INTRA-ARTICULAR; INTRALESIONAL; INTRAMUSCULAR; INTRAVENOUS; SOFT TISSUE at 08:01

## 2023-01-05 RX ADMIN — CARBOPLATIN 3.1 MG: 10 INJECTION, SOLUTION INTRAVENOUS at 11:01

## 2023-01-05 RX ADMIN — CARBOPLATIN 0.31 MG: 10 INJECTION, SOLUTION INTRAVENOUS at 10:01

## 2023-01-05 RX ADMIN — FAMOTIDINE 20 MG: 10 INJECTION INTRAVENOUS at 08:01

## 2023-01-05 RX ADMIN — APREPITANT 130 MG: 130 INJECTION, EMULSION INTRAVENOUS at 08:01

## 2023-01-05 RX ADMIN — CARBOPLATIN 31 MG: 10 INJECTION, SOLUTION INTRAVENOUS at 01:01

## 2023-01-05 RX ADMIN — Medication 10 ML: at 04:01

## 2023-01-05 RX ADMIN — DIPHENHYDRAMINE HYDROCHLORIDE 50 MG: 50 INJECTION, SOLUTION INTRAMUSCULAR; INTRAVENOUS at 09:01

## 2023-01-05 NOTE — PLAN OF CARE
"Pt tolerated C1D1 Carbo desensitization/ Doxil infusion today. Additional pre med of Benadryl 50 mg IVPush and Decadron 20mg IVPB given prior to Bag #4. Per MAR instructions and per MD Luque ok to give. Due to initial pre meds could be out of pt system for last bag of Carbo and Doxil infusion. Ok per Joselyn Pharm D. VSS. NAD. Port flushed + blood return present, flushed. Hep lock and deaccesed. Chemo education given to pt. AVS given to pt. Discharged home. Ambulated independently with daughter. Pt refused w/c when offered pt states "I'm fine I have my daughter with me"  Problem: Adult Inpatient Plan of Care  Goal: Optimal Comfort and Wellbeing  Intervention: Provide Person-Centered Care  1/5/2023 1617 by Tricia Oscar RN  Flowsheets (Taken 1/5/2023 1617)  Trust Relationship/Rapport:   care explained   thoughts/feelings acknowledged   choices provided   emotional support provided   empathic listening provided   questions answered   questions encouraged   reassurance provided       "

## 2023-01-13 ENCOUNTER — TELEPHONE (OUTPATIENT)
Dept: GYNECOLOGIC ONCOLOGY | Facility: CLINIC | Age: 73
End: 2023-01-13
Payer: MEDICARE

## 2023-01-13 NOTE — TELEPHONE ENCOUNTER
I spoke with Susana Ms.Haase daughter who states after her 1/5 treatment she has been experiencing a lot of side effects.Swelling, Dizziness, Trouble breathing, uncontrollable bladder and stomach pains. Susana wants to know can something else be done because her mom stated if she has to feel like this after every cycle she would prefer not to have chemo and just allow the cancer to do what it's going to do. Advised Susana that  is currently in surgery but I will forward all symptoms and concerns to her to address.

## 2023-01-13 NOTE — TELEPHONE ENCOUNTER
----- Message from Johnny Johnson sent at 1/13/2023  9:18 AM CST -----  Regarding: Pt call back  Pt's daughter called stating that pt is having issues with treatment and would like to speak with Dr. Luque      Contact Susana [phone]

## 2023-01-13 NOTE — TELEPHONE ENCOUNTER
"Called and spoke with Susana, Ms. Haase daughter.  Reiterating message below. Experiencing swelling "all over, not just legs", neuropathy, dizziness, SOB that has improved, and no bladder control that is new. Alternates constipation/diarrhea with IBS history.  States her mom said she doesn't want more chemo if each cycle is like this.  Acknowledged this is the worst part of the cycle before feeling better, days 2-7, and discussed SOB as part of the allergic reaction with previous carboplatin reaction tho pt did fine during desensitization.  Also would prefer a VV for next chemo appt as they live 2 hours away. Wants labs closer to home and drive in for chemo only.  Will obtain pulse oximeter to check O2 sats at home and better determine SOB/future need for ED.  Will review all with Dr. Luque. ED precautions reiterated.    ----- Message -----  From: Melissa Mott MA  Sent: 1/13/2023   9:48 AM CST  To: Rhonda Luque MD, #  Subject: chemo side effects                               I spoke with Cindy Ms.Haase daughter who states after her 1/5 treatment she has been experiencing a lot of side effects.Swelling, Dizziness, Trouble breathing, uncontrollable bladder and stomach pains. Susana wants to know can something else be done because her mom stated if she has to feel like this after every cycle she would prefer not to have chemo and just allow the cancer to do what it's going to do. Advised Susana that  is currently in surgery but I will forward all symptoms and concerns to her to address.       "

## 2023-01-17 ENCOUNTER — TELEPHONE (OUTPATIENT)
Dept: GYNECOLOGIC ONCOLOGY | Facility: CLINIC | Age: 73
End: 2023-01-17
Payer: MEDICARE

## 2023-01-17 NOTE — TELEPHONE ENCOUNTER
Spoke with pt's daughter who states her mom wanted to cancelled her lab appointment that is scheduled prior to her 2/2 chemo treatment. Patient is skeptical about proceeding with chemo and did not want to do the labs unless it was completely necessary. Patient is scheduled for a virtual appointment on 1/31 with  and will decide after that conversation if she will do labs and proceed with treatment.

## 2023-01-17 NOTE — TELEPHONE ENCOUNTER
----- Message from Lindsay Ellsworth sent at 1/17/2023 11:29 AM CST -----  Regarding: Consult/Advisory      Name Of Caller:  Ruiz      Contact Preference?:   Please leave a message in her MyChart, Or call her daughter Susana @ [phone].        What is the nature of the call?:  Patient does not know if she really would like to do the treatment on 02/02/23.  She would like to know if she should cancel the labs on 01/31/23 if she does not plan on going to the Infusion appointment.

## 2023-01-31 ENCOUNTER — OFFICE VISIT (OUTPATIENT)
Dept: GYNECOLOGIC ONCOLOGY | Facility: CLINIC | Age: 73
End: 2023-01-31
Payer: MEDICARE

## 2023-01-31 ENCOUNTER — LAB VISIT (OUTPATIENT)
Dept: LAB | Facility: HOSPITAL | Age: 73
End: 2023-01-31
Attending: OBSTETRICS & GYNECOLOGY
Payer: MEDICARE

## 2023-01-31 DIAGNOSIS — C48.2 PRIMARY PERITONEAL ADENOCARCINOMA: ICD-10-CM

## 2023-01-31 DIAGNOSIS — Z15.09 BRCA2 GENE MUTATION POSITIVE IN FEMALE: ICD-10-CM

## 2023-01-31 DIAGNOSIS — C48.2 PRIMARY PERITONEAL ADENOCARCINOMA: Primary | ICD-10-CM

## 2023-01-31 DIAGNOSIS — Z15.02 BRCA2 GENE MUTATION POSITIVE IN FEMALE: ICD-10-CM

## 2023-01-31 DIAGNOSIS — Z15.01 BRCA2 GENE MUTATION POSITIVE IN FEMALE: ICD-10-CM

## 2023-01-31 LAB
ALBUMIN SERPL BCP-MCNC: 3.5 G/DL (ref 3.5–5.2)
ALP SERPL-CCNC: 67 U/L (ref 55–135)
ALT SERPL W/O P-5'-P-CCNC: 26 U/L (ref 10–44)
ANION GAP SERPL CALC-SCNC: 4 MMOL/L (ref 8–16)
AST SERPL-CCNC: 32 U/L (ref 10–40)
BILIRUB SERPL-MCNC: 0.4 MG/DL (ref 0.1–1)
BUN SERPL-MCNC: 27 MG/DL (ref 8–23)
CALCIUM SERPL-MCNC: 10.2 MG/DL (ref 8.7–10.5)
CHLORIDE SERPL-SCNC: 105 MMOL/L (ref 95–110)
CO2 SERPL-SCNC: 28 MMOL/L (ref 23–29)
CREAT SERPL-MCNC: 0.7 MG/DL (ref 0.5–1.4)
ERYTHROCYTE [DISTWIDTH] IN BLOOD BY AUTOMATED COUNT: 15.7 % (ref 11.5–14.5)
EST. GFR  (NO RACE VARIABLE): >60 ML/MIN/1.73 M^2
GLUCOSE SERPL-MCNC: 109 MG/DL (ref 70–110)
HCT VFR BLD AUTO: 37.6 % (ref 37–48.5)
HGB BLD-MCNC: 12.8 G/DL (ref 12–16)
IMM GRANULOCYTES # BLD AUTO: 0.04 K/UL (ref 0–0.04)
MAGNESIUM SERPL-MCNC: 2.6 MG/DL (ref 1.6–2.6)
MCH RBC QN AUTO: 32.4 PG (ref 27–31)
MCHC RBC AUTO-ENTMCNC: 34 G/DL (ref 32–36)
MCV RBC AUTO: 95 FL (ref 82–98)
NEUTROPHILS # BLD AUTO: 2.7 K/UL (ref 1.8–7.7)
PLATELET # BLD AUTO: 198 K/UL (ref 150–450)
PMV BLD AUTO: 9 FL (ref 9.2–12.9)
POTASSIUM SERPL-SCNC: 3.8 MMOL/L (ref 3.5–5.1)
PROT SERPL-MCNC: 7.4 G/DL (ref 6–8.4)
RBC # BLD AUTO: 3.95 M/UL (ref 4–5.4)
SODIUM SERPL-SCNC: 137 MMOL/L (ref 136–145)
WBC # BLD AUTO: 4.75 K/UL (ref 3.9–12.7)

## 2023-01-31 PROCEDURE — 83735 ASSAY OF MAGNESIUM: CPT | Performed by: OBSTETRICS & GYNECOLOGY

## 2023-01-31 PROCEDURE — 99213 OFFICE O/P EST LOW 20 MIN: CPT | Mod: 95,,, | Performed by: OBSTETRICS & GYNECOLOGY

## 2023-01-31 PROCEDURE — 99213 PR OFFICE/OUTPT VISIT, EST, LEVL III, 20-29 MIN: ICD-10-PCS | Mod: 95,,, | Performed by: OBSTETRICS & GYNECOLOGY

## 2023-01-31 PROCEDURE — 80053 COMPREHEN METABOLIC PANEL: CPT | Performed by: OBSTETRICS & GYNECOLOGY

## 2023-01-31 PROCEDURE — 86304 IMMUNOASSAY TUMOR CA 125: CPT | Performed by: OBSTETRICS & GYNECOLOGY

## 2023-01-31 PROCEDURE — 85027 COMPLETE CBC AUTOMATED: CPT | Performed by: OBSTETRICS & GYNECOLOGY

## 2023-01-31 PROCEDURE — 36415 COLL VENOUS BLD VENIPUNCTURE: CPT | Performed by: OBSTETRICS & GYNECOLOGY

## 2023-02-01 LAB — CANCER AG125 SERPL-ACNC: 25 U/ML (ref 0–30)

## 2023-02-02 RX ORDER — SODIUM CHLORIDE 0.9 % (FLUSH) 0.9 %
10 SYRINGE (ML) INJECTION
Status: CANCELLED | OUTPATIENT
Start: 2023-02-02

## 2023-02-02 RX ORDER — EPINEPHRINE 0.1 MG/ML
0.5 INJECTION INTRAVENOUS
Status: CANCELLED | OUTPATIENT
Start: 2023-02-02

## 2023-02-02 RX ORDER — DIPHENHYDRAMINE HYDROCHLORIDE 50 MG/ML
50 INJECTION INTRAMUSCULAR; INTRAVENOUS
Status: CANCELLED | OUTPATIENT
Start: 2023-02-02

## 2023-02-02 RX ORDER — HEPARIN 100 UNIT/ML
500 SYRINGE INTRAVENOUS
Status: CANCELLED | OUTPATIENT
Start: 2023-02-02

## 2023-02-03 ENCOUNTER — TELEPHONE (OUTPATIENT)
Dept: GYNECOLOGIC ONCOLOGY | Facility: CLINIC | Age: 73
End: 2023-02-03
Payer: MEDICARE

## 2023-02-03 NOTE — TELEPHONE ENCOUNTER
----- Message from Klaudia Pereira RN sent at 2/2/2023  3:46 PM CST -----  Regarding: RE: Pt Call Back  Melissa,  The best I could do was 8 am on 2/23. I scheduled her.  Let me know if that does not work.    Thanks,  Klaudia      ----- Message -----  From: Melissa Mott MA  Sent: 2/2/2023   1:07 PM CST  To: Klaudia Periera RN  Subject: FW: Pt Call Back                                 Galo Rudolph can you tell me when will be the next available date to accommodate a Carbo only desensitization.   ----- Message -----  From: Rhonda Lquue MD  Sent: 2/2/2023  12:52 PM CST  To: Melissa Mott MA  Subject: FW: Pt Call Back                                 Please see message from daughter.     Please schedule for carbo only (no doxil) next wed or thurs if possible around 9a. Please make sure chemo knows its desensitization because requires additional nursing.    Please also schedule next cycle with labs and prechemo visit.     ----- Message -----  From: Daria Figueroa RN  Sent: 2/1/2023   4:10 PM CST  To: Rhonda Luqeu MD  Subject: FW: Pt Call Back                                 Good afternoon Dr. Luque,     Patient's daughter calling about patient's chemo treatment plan.     Thank you,     Daria  ----- Message -----  From: Johnny Johnson  Sent: 2/1/2023   4:03 PM CST  To: [facility] Staff  Subject: Pt Call Back                                     Pt's daughter called stating that she would like to continue with the Carbo only prepared to start next Wednesday or Thursday for about 9am if possible      Contact [facility] [phone]

## 2023-02-06 NOTE — PROGRESS NOTES
The patient location is: Home  The chief complaint leading to consultation is: prechemo    Visit type: audiovisual    Face to Face time with patient: 15  25 minutes of total time spent on the encounter, which includes face to face time and non-face to face time preparing to see the patient (eg, review of tests), Obtaining and/or reviewing separately obtained history, Documenting clinical information in the electronic or other health record, Independently interpreting results (not separately reported) and communicating results to the patient/family/caregiver, or Care coordination (not separately reported).         Each patient to whom he or she provides medical services by telemedicine is:  (1) informed of the relationship between the physician and patient and the respective role of any other health care provider with respect to management of the patient; and (2) notified that he or she may decline to receive medical services by telemedicine and may withdraw from such care at any time.    Notes:   Patient requested to speak virtually to discuss continued chemotherapy.   We reviewed expected side effects. Decreased . Risks, benefits and alternative to continuing to pursue therapies.   She is hesitant to do additional chemotherapy. If consider additional cycles plan to proceed with carbo single agent.

## 2023-02-15 ENCOUNTER — TELEPHONE (OUTPATIENT)
Dept: GYNECOLOGIC ONCOLOGY | Facility: CLINIC | Age: 73
End: 2023-02-15
Payer: MEDICARE

## 2023-02-15 NOTE — TELEPHONE ENCOUNTER
----- Message from Kimberly Tobar sent at 2/15/2023 11:51 AM CST -----  Type:  Patient Returning Call    Who Called:     Who Left Message for Patient:     Does the patient know what this is regarding?: missed call     Best Call Back Number:   [phone] / marcella /daughter     Additional Information:   patient state can send message in my [facility] or contact daughter

## 2023-02-21 ENCOUNTER — LAB VISIT (OUTPATIENT)
Dept: LAB | Facility: HOSPITAL | Age: 73
End: 2023-02-21
Attending: OBSTETRICS & GYNECOLOGY
Payer: MEDICARE

## 2023-02-21 DIAGNOSIS — Z15.02 BRCA2 GENE MUTATION POSITIVE IN FEMALE: ICD-10-CM

## 2023-02-21 DIAGNOSIS — C48.2 PRIMARY PERITONEAL ADENOCARCINOMA: ICD-10-CM

## 2023-02-21 DIAGNOSIS — Z15.01 BRCA2 GENE MUTATION POSITIVE IN FEMALE: ICD-10-CM

## 2023-02-21 DIAGNOSIS — Z15.09 BRCA2 GENE MUTATION POSITIVE IN FEMALE: ICD-10-CM

## 2023-02-21 LAB
ALBUMIN SERPL BCP-MCNC: 4.1 G/DL (ref 3.5–5.2)
ALP SERPL-CCNC: 84 U/L (ref 55–135)
ALT SERPL W/O P-5'-P-CCNC: 38 U/L (ref 10–44)
ANION GAP SERPL CALC-SCNC: 4 MMOL/L (ref 8–16)
AST SERPL-CCNC: 43 U/L (ref 10–40)
BILIRUB SERPL-MCNC: 0.3 MG/DL (ref 0.1–1)
BUN SERPL-MCNC: 24 MG/DL (ref 8–23)
CALCIUM SERPL-MCNC: 10.5 MG/DL (ref 8.7–10.5)
CANCER AG125 SERPL-ACNC: 16 U/ML (ref 0–30)
CHLORIDE SERPL-SCNC: 102 MMOL/L (ref 95–110)
CO2 SERPL-SCNC: 30 MMOL/L (ref 23–29)
CREAT SERPL-MCNC: 1 MG/DL (ref 0.5–1.4)
ERYTHROCYTE [DISTWIDTH] IN BLOOD BY AUTOMATED COUNT: 15.2 % (ref 11.5–14.5)
EST. GFR  (NO RACE VARIABLE): 59.9 ML/MIN/1.73 M^2
GLUCOSE SERPL-MCNC: 100 MG/DL (ref 70–110)
HCT VFR BLD AUTO: 43 % (ref 37–48.5)
HGB BLD-MCNC: 14.5 G/DL (ref 12–16)
IMM GRANULOCYTES # BLD AUTO: 0.07 K/UL (ref 0–0.04)
MAGNESIUM SERPL-MCNC: 2.9 MG/DL (ref 1.6–2.6)
MCH RBC QN AUTO: 32.2 PG (ref 27–31)
MCHC RBC AUTO-ENTMCNC: 33.7 G/DL (ref 32–36)
MCV RBC AUTO: 95 FL (ref 82–98)
NEUTROPHILS # BLD AUTO: 3.7 K/UL (ref 1.8–7.7)
PLATELET # BLD AUTO: 316 K/UL (ref 150–450)
PMV BLD AUTO: 9.7 FL (ref 9.2–12.9)
POTASSIUM SERPL-SCNC: 4.4 MMOL/L (ref 3.5–5.1)
PROT SERPL-MCNC: 8.6 G/DL (ref 6–8.4)
RBC # BLD AUTO: 4.51 M/UL (ref 4–5.4)
SODIUM SERPL-SCNC: 136 MMOL/L (ref 136–145)
WBC # BLD AUTO: 7.6 K/UL (ref 3.9–12.7)

## 2023-02-21 PROCEDURE — 83735 ASSAY OF MAGNESIUM: CPT | Performed by: OBSTETRICS & GYNECOLOGY

## 2023-02-21 PROCEDURE — 36415 COLL VENOUS BLD VENIPUNCTURE: CPT | Performed by: OBSTETRICS & GYNECOLOGY

## 2023-02-21 PROCEDURE — 86304 IMMUNOASSAY TUMOR CA 125: CPT | Performed by: OBSTETRICS & GYNECOLOGY

## 2023-02-21 PROCEDURE — 85027 COMPLETE CBC AUTOMATED: CPT | Performed by: OBSTETRICS & GYNECOLOGY

## 2023-02-21 PROCEDURE — 80053 COMPREHEN METABOLIC PANEL: CPT | Performed by: OBSTETRICS & GYNECOLOGY

## 2023-02-22 ENCOUNTER — TELEPHONE (OUTPATIENT)
Dept: GYNECOLOGIC ONCOLOGY | Facility: CLINIC | Age: 73
End: 2023-02-22
Payer: MEDICARE

## 2023-02-22 NOTE — PROGRESS NOTES
Called and spoke with patient's daughter.   Feels well and ready to proceed with chemo.   Labs reviewed and appropriate for treatment.   Carbo desensitization single agent.

## 2023-02-22 NOTE — TELEPHONE ENCOUNTER
----- Message from Kallie Sidhu sent at 2/22/2023  8:19 AM CST -----  Name of Who is Calling: HAASE, MURLENE MARY [[id number]]               What is the request in detail: Patient requesting a call back to discuss signing papers for treatment              Can the clinic reply by [facility]: No              What Number to Call Back if not in [facility]: [phone]

## 2023-02-22 NOTE — TELEPHONE ENCOUNTER
----- Message from Rhonda Luque MD sent at 2/22/2023  8:04 AM CST -----  Regarding: needs MD visit  Please schedule patient a visit with me prior to her next chemo. She has labs and infusion scheduled. TY

## 2023-02-23 ENCOUNTER — INFUSION (OUTPATIENT)
Dept: INFUSION THERAPY | Facility: HOSPITAL | Age: 73
End: 2023-02-23
Payer: MEDICARE

## 2023-02-23 VITALS
WEIGHT: 116.38 LBS | HEIGHT: 64 IN | RESPIRATION RATE: 18 BRPM | OXYGEN SATURATION: 99 % | DIASTOLIC BLOOD PRESSURE: 60 MMHG | TEMPERATURE: 98 F | SYSTOLIC BLOOD PRESSURE: 129 MMHG | BODY MASS INDEX: 19.87 KG/M2 | HEART RATE: 68 BPM

## 2023-02-23 DIAGNOSIS — C48.2 PRIMARY PERITONEAL ADENOCARCINOMA: Primary | ICD-10-CM

## 2023-02-23 PROCEDURE — 96367 TX/PROPH/DG ADDL SEQ IV INF: CPT

## 2023-02-23 PROCEDURE — 96417 CHEMO IV INFUS EACH ADDL SEQ: CPT

## 2023-02-23 PROCEDURE — 96413 CHEMO IV INFUSION 1 HR: CPT

## 2023-02-23 PROCEDURE — 96375 TX/PRO/DX INJ NEW DRUG ADDON: CPT

## 2023-02-23 PROCEDURE — A4216 STERILE WATER/SALINE, 10 ML: HCPCS | Performed by: OBSTETRICS & GYNECOLOGY

## 2023-02-23 PROCEDURE — 63600175 PHARM REV CODE 636 W HCPCS: Mod: JG | Performed by: OBSTETRICS & GYNECOLOGY

## 2023-02-23 PROCEDURE — 25000003 PHARM REV CODE 250: Performed by: OBSTETRICS & GYNECOLOGY

## 2023-02-23 RX ORDER — LIDOCAINE AND PRILOCAINE 25; 25 MG/G; MG/G
CREAM TOPICAL
Qty: 30 G | Refills: 1 | Status: SHIPPED | OUTPATIENT
Start: 2023-02-23

## 2023-02-23 RX ORDER — SODIUM CHLORIDE 0.9 % (FLUSH) 0.9 %
10 SYRINGE (ML) INJECTION
Status: DISCONTINUED | OUTPATIENT
Start: 2023-02-23 | End: 2023-02-23 | Stop reason: HOSPADM

## 2023-02-23 RX ORDER — DIPHENHYDRAMINE HYDROCHLORIDE 50 MG/ML
50 INJECTION INTRAMUSCULAR; INTRAVENOUS
Status: DISCONTINUED | OUTPATIENT
Start: 2023-02-23 | End: 2023-02-23 | Stop reason: HOSPADM

## 2023-02-23 RX ORDER — EPINEPHRINE 0.3 MG/.3ML
0.3 INJECTION SUBCUTANEOUS
Status: DISCONTINUED | OUTPATIENT
Start: 2023-02-23 | End: 2023-02-23 | Stop reason: HOSPADM

## 2023-02-23 RX ORDER — HEPARIN 100 UNIT/ML
500 SYRINGE INTRAVENOUS
Status: DISCONTINUED | OUTPATIENT
Start: 2023-02-23 | End: 2023-02-23 | Stop reason: HOSPADM

## 2023-02-23 RX ADMIN — CARBOPLATIN 25 MG: 10 INJECTION, SOLUTION INTRAVENOUS at 02:02

## 2023-02-23 RX ADMIN — Medication 10 ML: at 05:02

## 2023-02-23 RX ADMIN — APREPITANT 130 MG: 130 INJECTION, EMULSION INTRAVENOUS at 10:02

## 2023-02-23 RX ADMIN — DIPHENHYDRAMINE HYDROCHLORIDE 50 MG: 50 INJECTION, SOLUTION INTRAMUSCULAR; INTRAVENOUS at 03:02

## 2023-02-23 RX ADMIN — CARBOPLATIN 5 MG: 10 INJECTION, SOLUTION INTRAVENOUS at 01:02

## 2023-02-23 RX ADMIN — CARBOPLATIN 5 MG: 10 INJECTION, SOLUTION INTRAVENOUS at 11:02

## 2023-02-23 RX ADMIN — CARBOPLATIN 240 MG: 10 INJECTION, SOLUTION INTRAVENOUS at 04:02

## 2023-02-23 RX ADMIN — DEXAMETHASONE SODIUM PHOSPHATE 0.25 MG: 4 INJECTION, SOLUTION INTRA-ARTICULAR; INTRALESIONAL; INTRAMUSCULAR; INTRAVENOUS; SOFT TISSUE at 10:02

## 2023-02-23 RX ADMIN — FAMOTIDINE 20 MG: 10 INJECTION INTRAVENOUS at 09:02

## 2023-02-23 RX ADMIN — HEPARIN SODIUM (PORCINE) LOCK FLUSH IV SOLN 100 UNIT/ML 500 UNITS: 100 SOLUTION at 05:02

## 2023-02-23 RX ADMIN — DEXAMETHASONE SODIUM PHOSPHATE 20 MG: 4 INJECTION, SOLUTION INTRA-ARTICULAR; INTRALESIONAL; INTRAMUSCULAR; INTRAVENOUS; SOFT TISSUE at 03:02

## 2023-02-23 RX ADMIN — DIPHENHYDRAMINE HYDROCHLORIDE 50 MG: 50 INJECTION, SOLUTION INTRAMUSCULAR; INTRAVENOUS at 09:02

## 2023-02-23 NOTE — PLAN OF CARE
1725 pt tolerated C2D1 Carbo desen. Additional benadryl and dex given per MAR (same as last cycle) before bag #4. VSS. Pt reported loose bowels and frequent urination during infusion, pt also reported the same after last treatment that improved. Pt refused post obs period. No signs of adverse reaction noted, daughter present at chairside for d/c. NAD noted, pt d/c home. Pt left unit ambulatory accompanied by daughter.

## 2023-03-21 ENCOUNTER — LAB VISIT (OUTPATIENT)
Dept: LAB | Facility: HOSPITAL | Age: 73
End: 2023-03-21
Attending: OBSTETRICS & GYNECOLOGY
Payer: MEDICARE

## 2023-03-21 DIAGNOSIS — Z15.01 BRCA2 GENE MUTATION POSITIVE IN FEMALE: ICD-10-CM

## 2023-03-21 DIAGNOSIS — C48.2 PRIMARY PERITONEAL ADENOCARCINOMA: ICD-10-CM

## 2023-03-21 DIAGNOSIS — Z15.09 BRCA2 GENE MUTATION POSITIVE IN FEMALE: ICD-10-CM

## 2023-03-21 DIAGNOSIS — Z15.02 BRCA2 GENE MUTATION POSITIVE IN FEMALE: ICD-10-CM

## 2023-03-21 LAB
ALBUMIN SERPL BCP-MCNC: 3.8 G/DL (ref 3.5–5.2)
ALP SERPL-CCNC: 121 U/L (ref 55–135)
ALT SERPL W/O P-5'-P-CCNC: 35 U/L (ref 10–44)
ANION GAP SERPL CALC-SCNC: 4 MMOL/L (ref 8–16)
AST SERPL-CCNC: 31 U/L (ref 10–40)
BILIRUB SERPL-MCNC: 0.2 MG/DL (ref 0.1–1)
BUN SERPL-MCNC: 32 MG/DL (ref 8–23)
CALCIUM SERPL-MCNC: 9.4 MG/DL (ref 8.7–10.5)
CANCER AG125 SERPL-ACNC: 11 U/ML (ref 0–30)
CHLORIDE SERPL-SCNC: 99 MMOL/L (ref 95–110)
CO2 SERPL-SCNC: 30 MMOL/L (ref 23–29)
CREAT SERPL-MCNC: 0.8 MG/DL (ref 0.5–1.4)
ERYTHROCYTE [DISTWIDTH] IN BLOOD BY AUTOMATED COUNT: 14.5 % (ref 11.5–14.5)
EST. GFR  (NO RACE VARIABLE): >60 ML/MIN/1.73 M^2
GLUCOSE SERPL-MCNC: 93 MG/DL (ref 70–110)
HCT VFR BLD AUTO: 39.3 % (ref 37–48.5)
HGB BLD-MCNC: 13.1 G/DL (ref 12–16)
IMM GRANULOCYTES # BLD AUTO: 0.04 K/UL (ref 0–0.04)
MAGNESIUM SERPL-MCNC: 2.7 MG/DL (ref 1.6–2.6)
MCH RBC QN AUTO: 32.2 PG (ref 27–31)
MCHC RBC AUTO-ENTMCNC: 33.3 G/DL (ref 32–36)
MCV RBC AUTO: 97 FL (ref 82–98)
NEUTROPHILS # BLD AUTO: 3.4 K/UL (ref 1.8–7.7)
PLATELET # BLD AUTO: 220 K/UL (ref 150–450)
PMV BLD AUTO: 9.6 FL (ref 9.2–12.9)
POTASSIUM SERPL-SCNC: 4.2 MMOL/L (ref 3.5–5.1)
PROT SERPL-MCNC: 7.7 G/DL (ref 6–8.4)
RBC # BLD AUTO: 4.07 M/UL (ref 4–5.4)
SODIUM SERPL-SCNC: 133 MMOL/L (ref 136–145)
WBC # BLD AUTO: 7.2 K/UL (ref 3.9–12.7)

## 2023-03-21 PROCEDURE — 86304 IMMUNOASSAY TUMOR CA 125: CPT | Performed by: OBSTETRICS & GYNECOLOGY

## 2023-03-21 PROCEDURE — 85027 COMPLETE CBC AUTOMATED: CPT | Performed by: OBSTETRICS & GYNECOLOGY

## 2023-03-21 PROCEDURE — 80053 COMPREHEN METABOLIC PANEL: CPT | Performed by: OBSTETRICS & GYNECOLOGY

## 2023-03-21 PROCEDURE — 36415 COLL VENOUS BLD VENIPUNCTURE: CPT | Performed by: OBSTETRICS & GYNECOLOGY

## 2023-03-21 PROCEDURE — 83735 ASSAY OF MAGNESIUM: CPT | Performed by: OBSTETRICS & GYNECOLOGY

## 2023-03-21 NOTE — PROGRESS NOTES
"Subjective:      Patient ID: Murlene Mary Haase is a 72 y.o. female.    Chief Complaint: No chief complaint on file.  The patient location is: Home  The chief complaint leading to consultation is: prechemo     Visit type: audiovisual     Face to Face time with patient: 20  45 minutes of total time spent on the encounter, which includes face to face time and non-face to face time preparing to see the patient (eg, review of tests), Obtaining and/or reviewing separately obtained history, Documenting clinical information in the electronic or other health record, Independently interpreting results (not separately reported) and communicating results to the patient/family/caregiver, or Care coordination (not separately reported).            Each patient to whom he or she provides medical services by telemedicine is:  (1) informed of the relationship between the physician and patient and the respective role of any other health care provider with respect to management of the patient; and (2) notified that he or she may decline to receive medical services by telemedicine and may withdraw from such care at any time.       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 She was on niraparib maintenance 200 mg daily. Started in March 2021. In Sep 2021 she asked to stop it because of worsening fatigue and GI upset.      Sep 2021: : 11. Previously 8. She is feeling better off niraparib. Also saw a GI doctor and started on glutamine and bentyl. More active since off niraparib. Also bloating and gas have resolved. Back to "running".      Sees Dr. Tana Deutsch her medical oncologist. But reports having not seen her in a while.       Last visit 3/2022:  is 21 3/2022  Discontinued niraparib previously. Wants to focus on quality of life.   No evidence of disease on today's exam.    21, some slight elevation from baseline. Does not desire any further interventions (CT, etc) at this time. "   She overall feels well and is asymptomatic.   Plan for RTC in 3 months with  or sooner if needed.         6/14/2022 with continued upward trend 21>31. She desired to continue to follow the trend.    29> 40> 54 12/1/2022  CT 12/7/2022 shows progressive disease  - 5.6 x 2.9 x 3.7 cm heterogeneously enhancing mass in the jessenia hepatis, enlarged from previous exams and consistent with metastatic katia disease.    - 3.1 x 2.0 x 3.1 cm similar appearing ovoid mass within the right iliac chain, also increased.  Last platinum 3/2021, previous carbo allergy.    Last platinum chemotherapy being March 2021.  She did have a carbo reaction during that session of chemotherapy.  Ideally would like to use platinum-based given platinum sensitive and we discussed carboplatin desensitization protocols. Discussed carbo + Taxol or doxil +/- Avastin. 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 Significant IBS/GI issues and after discussions of risks and benefits of avastin will not pursue at this time.      Carbo desensitization AUC4/Doxil 30mg/m2   Echo 1/3/2023 EF 75%   C1- 1/5  C2- 2/23, discontinued doxil due to fatigue intolerable side effects with doublet therapy  C3-        Today's visit:  Presents today consideration of C3.  Labs reviewed and appropriate for treatment.    48>25>16> 11 (good response by )  Several complaints today, none dose limiting. Endorses difficulty sleeping and overactive bladder. States remeron previously described does not help, requests ambien. Requests refill of oxybutin. Also feels she has sinusitis with sinus pressure.    Will plan for interval imaging assessment following this cycle #3.      ____________________________    Her oncologic history is:    July 2013: She was taken to the operating room on July 25, 2013 for preoperative diagnosis consistent with ovarian cancer. 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 At that time she underwent  an omentectomy and bilateral salpingo-oophorectomy however she had bulky residual disease along the diaphragms as well as paracolic gutters and pelvis.    She was then treated with 3 cycles of dose dense paclitaxel and carboplatin. A CT scan obtained after this showed the following:    Subcentimeter hepatic lesions, too small to accurately characterize.    Subtle, increased soft tissue attenuation along the periphery of the right hepatic lobe that is nonspecific but is favored to represent a prominent right hemidiaphragm. Peritoneal thickening is thought to be less likely but cannot be entirely excluded   and correlation with prior imaging studies is advised if available.     Large amount of stool throughout entire colon.    L1 vertebral body compression fracture of unknown chronicity.   Small pericardial effusion.    She then received 2 more cycles of dose dense paclitaxel and carboplatin and then received one cycle of standard Taxol and carboplatin.     Dec 2013:  Her last chemotherapy was December 26, 2013.    At the completion of 6 cycles of postoperative Taxol and carboplatin, her  was 7 and her CT scan of the abdomen and pelvis showed no evidence for disease.        Feb 2016,  was 24.May 2016  was 35. June 2016: 33.   May 2016: CT scan abdomen and pelvis was negative.   August 2016:  was 31.  Nov 2016: C A 125 36  Feb 2017:  56. She has had a mildly elevated  in the 30s with negative scanning.   Feb 2017:  was 56. CT scan showed possible intussusception and Pelvis:  2.0 x 0.9 cm soft tissue attenuating focus in the anterior mid pelvis that abuts bowel, and is present on arterial/delayed phases.      PET was done and shows adenopathy as well.   June 2017: Completed 6 cycles of taxol and carboplatin on 6/29/2017 for recurrent ovarian cancer.  Received chemotherapy with Dr. Tana Deutsch.  CT scan after completion of 6 cycles of taxol and carboplatin shows stable right  pelvic lymph node index lesion measuring 0.7 cm with some prominent lymph nodes in the jessenia hepatis, unchanged from prior.  There is no new lymphadenopathy identified.   was 8.       Oct 2017:  She tried Niraparib but had a reaction to it on two occasions and stopped this. Started with 3 tablets day one and had SOB and chest pain. Waited several days and restarted with 1 tablet. Had the same symptoms and did not want to resume.      May 2020:  is 36.     Nov 2020: : 45. CT scan AP: 2.5 cm soft tissue mass in the jessenia hepatis does abut the pancreas though favored to be an enlarged node.  There is an additional enlarged node in the right hemipelvis.  Findings concerning for metastatic disease. Started taxol and carboplatin.      Jan 2021: She has completed 3 cycles of chemotherapy. Taxol and carboplatin x 2 and taxol x 1 due to carboplatin reaction at time of C3.   : 15. CT scan AP:  Previously identified soft tissue mass/lymph node in the jessenia hepatitis measures 1.5 cm (axial series 2, image 32), previously measured 2.5 cm. Previously identified soft tissue nodule/lymph node along the course of the right external iliac measures 1.1 cm (axial series 2, image 88), previously measured 1.7 cm (axial series 2, image 100).     Mar 2021: has completed 6 cycles of chemotherapy.  Taxol and carboplatin x 2 and taxol x 4 due to carboplatin reaction at time of C3.: 10. Last chemotherapy was 3/3/21. CT scan AP: jessenia hepatis node 1.3 cm, pelvic node 9 mm. She has a BRCA 2 mutation.Started niraparib 200 mg daily.      Patient with a BRCA 2 mutation.   No available tissue for STRATA testing as her surgery was in 2013.       Colonoscopy: Aug 11, 2016: inflammation and diverticulosis  EGD: Aug 11, 2016: normal   Review of Systems   Constitutional:  Negative for appetite change, chills, fatigue and fever.   HENT:  Negative for mouth sores.    Respiratory:  Negative for cough and shortness of breath.     Cardiovascular:  Negative for leg swelling.   Gastrointestinal:  Negative for abdominal pain, blood in stool, constipation and diarrhea.   Endocrine: Negative for cold intolerance.   Genitourinary:  Negative for dysuria and vaginal bleeding.   Musculoskeletal:  Negative for myalgias.   Skin:  Negative for rash.   Allergic/Immunologic: Negative.    Neurological:  Negative for weakness and numbness.   Hematological:  Negative for adenopathy. Does not bruise/bleed easily.   Psychiatric/Behavioral:  Negative for confusion.      Objective:   Physical Exam:   Constitutional: She is oriented to person, place, and time. She appears well-developed and well-nourished. No distress.    HENT:   Head: Normocephalic and atraumatic.    Eyes: EOM are normal.      Pulmonary/Chest: Effort normal.                  Musculoskeletal: Moves all extremeties.       Neurological: She is alert and oriented to person, place, and time.    Skin: Skin is dry. No pallor.    Psychiatric: She has a normal mood and affect. Her behavior is normal. Judgment and thought content normal.     Assessment:     1. Primary peritoneal adenocarcinoma    2. 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 Sinusitis, unspecified chronicity, unspecified location    5. Overactive bladder        Plan:     Orders Placed This Encounter   Procedures    CT Chest Abdomen Pelvis With Contrast    Echo Saline Bubble? No     Okay to treat C3 as above  Will plan for interval imaging assessment following this cycle.   RTC prechemo or sooner if needed.    I spent approximately 45 minutes reviewing the available records and evaluating the patient, out of which over 50% of the time was spent face to face with the patient in counseling and coordinating this patient's care.

## 2023-03-22 ENCOUNTER — OFFICE VISIT (OUTPATIENT)
Dept: GYNECOLOGIC ONCOLOGY | Facility: CLINIC | Age: 73
End: 2023-03-22
Payer: MEDICARE

## 2023-03-22 ENCOUNTER — TELEPHONE (OUTPATIENT)
Dept: GYNECOLOGIC ONCOLOGY | Facility: CLINIC | Age: 73
End: 2023-03-22
Payer: MEDICARE

## 2023-03-22 DIAGNOSIS — Z01.818 EXAMINATION PRIOR TO CHEMOTHERAPY: ICD-10-CM

## 2023-03-22 DIAGNOSIS — N32.81 OVERACTIVE BLADDER: ICD-10-CM

## 2023-03-22 DIAGNOSIS — J32.9 SINUSITIS, UNSPECIFIED CHRONICITY, UNSPECIFIED LOCATION: ICD-10-CM

## 2023-03-22 DIAGNOSIS — C48.2 PRIMARY PERITONEAL ADENOCARCINOMA: Primary | ICD-10-CM

## 2023-03-22 DIAGNOSIS — G47.01 INSOMNIA DUE TO MEDICAL CONDITION: ICD-10-CM

## 2023-03-22 PROCEDURE — 99215 OFFICE O/P EST HI 40 MIN: CPT | Mod: 95,,, | Performed by: OBSTETRICS & GYNECOLOGY

## 2023-03-22 PROCEDURE — 99215 PR OFFICE/OUTPT VISIT, EST, LEVL V, 40-54 MIN: ICD-10-PCS | Mod: 95,,, | Performed by: OBSTETRICS & GYNECOLOGY

## 2023-03-22 RX ORDER — ZOLPIDEM TARTRATE 5 MG/1
5 TABLET ORAL NIGHTLY PRN
Qty: 30 TABLET | Refills: 0 | Status: SHIPPED | OUTPATIENT
Start: 2023-03-22 | End: 2023-08-31

## 2023-03-22 RX ORDER — SODIUM CHLORIDE 0.9 % (FLUSH) 0.9 %
10 SYRINGE (ML) INJECTION
Status: CANCELLED | OUTPATIENT
Start: 2023-03-22

## 2023-03-22 RX ORDER — OXYBUTYNIN CHLORIDE 5 MG/1
5 TABLET ORAL DAILY
Qty: 90 TABLET | Refills: 3 | Status: SHIPPED | OUTPATIENT
Start: 2023-03-22 | End: 2023-04-25 | Stop reason: SDUPTHER

## 2023-03-22 RX ORDER — EPINEPHRINE 0.1 MG/ML
0.5 INJECTION INTRAVENOUS
Status: CANCELLED | OUTPATIENT
Start: 2023-03-22

## 2023-03-22 RX ORDER — AZITHROMYCIN 250 MG/1
TABLET, FILM COATED ORAL
Qty: 6 TABLET | Refills: 0 | Status: SHIPPED | OUTPATIENT
Start: 2023-03-22 | End: 2023-03-27

## 2023-03-22 RX ORDER — DIPHENHYDRAMINE HYDROCHLORIDE 50 MG/ML
50 INJECTION INTRAMUSCULAR; INTRAVENOUS
Status: CANCELLED | OUTPATIENT
Start: 2023-03-22

## 2023-03-22 RX ORDER — HEPARIN 100 UNIT/ML
500 SYRINGE INTRAVENOUS
Status: CANCELLED | OUTPATIENT
Start: 2023-03-22

## 2023-03-22 NOTE — TELEPHONE ENCOUNTER
----- Message from Rhonda Luque MD sent at 3/22/2023 10:29 AM CDT -----  @Pilar-- please schedule for CT  @J'me-- please schedule C4 carbo desensitization  Thank you!

## 2023-03-23 ENCOUNTER — INFUSION (OUTPATIENT)
Dept: INFUSION THERAPY | Facility: HOSPITAL | Age: 73
End: 2023-03-23
Payer: MEDICARE

## 2023-03-23 VITALS
SYSTOLIC BLOOD PRESSURE: 111 MMHG | TEMPERATURE: 98 F | BODY MASS INDEX: 20.25 KG/M2 | HEIGHT: 64 IN | OXYGEN SATURATION: 96 % | HEART RATE: 76 BPM | WEIGHT: 118.63 LBS | DIASTOLIC BLOOD PRESSURE: 57 MMHG | RESPIRATION RATE: 18 BRPM

## 2023-03-23 DIAGNOSIS — C48.2 PRIMARY PERITONEAL ADENOCARCINOMA: Primary | ICD-10-CM

## 2023-03-23 PROCEDURE — 96413 CHEMO IV INFUSION 1 HR: CPT

## 2023-03-23 PROCEDURE — 96367 TX/PROPH/DG ADDL SEQ IV INF: CPT

## 2023-03-23 PROCEDURE — 63600175 PHARM REV CODE 636 W HCPCS: Performed by: OBSTETRICS & GYNECOLOGY

## 2023-03-23 PROCEDURE — 25000003 PHARM REV CODE 250: Performed by: OBSTETRICS & GYNECOLOGY

## 2023-03-23 PROCEDURE — 96415 CHEMO IV INFUSION ADDL HR: CPT

## 2023-03-23 PROCEDURE — 96375 TX/PRO/DX INJ NEW DRUG ADDON: CPT

## 2023-03-23 RX ORDER — EPINEPHRINE 0.3 MG/.3ML
INJECTION SUBCUTANEOUS
Status: DISCONTINUED | OUTPATIENT
Start: 2023-03-23 | End: 2023-03-23 | Stop reason: HOSPADM

## 2023-03-23 RX ORDER — DIPHENHYDRAMINE HYDROCHLORIDE 50 MG/ML
50 INJECTION INTRAMUSCULAR; INTRAVENOUS
Status: DISCONTINUED | OUTPATIENT
Start: 2023-03-23 | End: 2023-03-23 | Stop reason: HOSPADM

## 2023-03-23 RX ORDER — HEPARIN 100 UNIT/ML
500 SYRINGE INTRAVENOUS
Status: DISCONTINUED | OUTPATIENT
Start: 2023-03-23 | End: 2023-03-23 | Stop reason: HOSPADM

## 2023-03-23 RX ORDER — SODIUM CHLORIDE 0.9 % (FLUSH) 0.9 %
10 SYRINGE (ML) INJECTION
Status: DISCONTINUED | OUTPATIENT
Start: 2023-03-23 | End: 2023-03-23 | Stop reason: HOSPADM

## 2023-03-23 RX ADMIN — CARBOPLATIN 277.4 MG: 10 INJECTION, SOLUTION INTRAVENOUS at 02:03

## 2023-03-23 RX ADMIN — DIPHENHYDRAMINE HYDROCHLORIDE 50 MG: 50 INJECTION, SOLUTION INTRAMUSCULAR; INTRAVENOUS at 09:03

## 2023-03-23 RX ADMIN — CARBOPLATIN 31.2 MG: 10 INJECTION, SOLUTION INTRAVENOUS at 01:03

## 2023-03-23 RX ADMIN — CARBOPLATIN 0.31 MG: 10 INJECTION, SOLUTION INTRAVENOUS at 10:03

## 2023-03-23 RX ADMIN — CARBOPLATIN 3.12 MG: 10 INJECTION, SOLUTION INTRAVENOUS at 12:03

## 2023-03-23 RX ADMIN — DEXAMETHASONE SODIUM PHOSPHATE 0.25 MG: 4 INJECTION, SOLUTION INTRA-ARTICULAR; INTRALESIONAL; INTRAMUSCULAR; INTRAVENOUS; SOFT TISSUE at 09:03

## 2023-03-23 RX ADMIN — FAMOTIDINE 20 MG: 10 INJECTION INTRAVENOUS at 10:03

## 2023-03-23 RX ADMIN — APREPITANT 130 MG: 130 INJECTION, EMULSION INTRAVENOUS at 09:03

## 2023-03-23 NOTE — PLAN OF CARE
Pt ambulatory to clinic with daughter today for C3 Carbo desens. Denies any sig complaints. Port accessed without difficulty. Good blood return. Pre meds given and desens started. Pt tolerated infusions well. Pt declined to be OBS after bag #4. Port deaccessed after flushing. Pt ambulatory from clinic with daughter. Declined wheelchair. I NAD.

## 2023-03-24 ENCOUNTER — TELEPHONE (OUTPATIENT)
Dept: GYNECOLOGIC ONCOLOGY | Facility: CLINIC | Age: 73
End: 2023-03-24
Payer: MEDICARE

## 2023-03-24 NOTE — TELEPHONE ENCOUNTER
----- Message from Ethan Thomas sent at 3/24/2023  1:16 PM CDT -----  Name of Who is Calling: HAASE, MURLENE MARY [[id number]]           What is the request in detail: PT HAASE,MURLENE MARY stated she wants to reschedule her chemo appt on 4/13/2023.Please contact to further discuss and advise.            Can the clinic reply by [facility]: NO           What Number to Call Back if not in [facility]: [phone]

## 2023-03-31 ENCOUNTER — TELEPHONE (OUTPATIENT)
Dept: GYNECOLOGIC ONCOLOGY | Facility: CLINIC | Age: 73
End: 2023-03-31
Payer: MEDICARE

## 2023-03-31 ENCOUNTER — CLINICAL SUPPORT (OUTPATIENT)
Dept: CARDIOLOGY | Facility: HOSPITAL | Age: 73
End: 2023-03-31
Attending: OBSTETRICS & GYNECOLOGY
Payer: MEDICARE

## 2023-03-31 ENCOUNTER — HOSPITAL ENCOUNTER (OUTPATIENT)
Dept: RADIOLOGY | Facility: HOSPITAL | Age: 73
Discharge: HOME | End: 2023-03-31
Attending: OBSTETRICS & GYNECOLOGY
Payer: MEDICARE

## 2023-03-31 DIAGNOSIS — C48.2 PRIMARY PERITONEAL ADENOCARCINOMA: ICD-10-CM

## 2023-03-31 DIAGNOSIS — C48.2 PRIMARY ADENOCARCINOMA OF PERITONEUM: ICD-10-CM

## 2023-03-31 PROCEDURE — 74177 CT ABD & PELVIS W/CONTRAST: CPT | Mod: TC

## 2023-03-31 PROCEDURE — 25500020 PHARM REV CODE 255: Performed by: OBSTETRICS & GYNECOLOGY

## 2023-03-31 PROCEDURE — 93306 TTE W/DOPPLER COMPLETE: CPT

## 2023-03-31 PROCEDURE — 71260 CT THORAX DX C+: CPT | Mod: TC

## 2023-03-31 RX ADMIN — IOHEXOL 100 ML: 350 INJECTION, SOLUTION INTRAVENOUS at 08:03

## 2023-03-31 NOTE — TELEPHONE ENCOUNTER
Called patient to review CT which shows positive response to treatment. I recommend continuing with our current chemotherapy regimen. No answer. Left voicemail.

## 2023-04-05 LAB
AORTIC ROOT ANNULUS: 2.89 CM
AORTIC VALVE CUSP SEPERATION: 1.61 CM
AV INDEX (PROSTH): 0.93
AV MEAN GRADIENT: 6 MMHG
AV PEAK GRADIENT: 10 MMHG
AV REGURGITATION PRESSURE HALF TIME: 557.66 MS
AV VALVE AREA: 2.51 CM2
AV VELOCITY RATIO: 0.81
CV ECHO LV RWT: 0.94 CM
DOP CALC AO PEAK VEL: 1.55 M/S
DOP CALC AO VTI: 30.8 CM
DOP CALC LVOT AREA: 2.7 CM2
DOP CALC LVOT DIAMETER: 1.86 CM
DOP CALC LVOT PEAK VEL: 1.25 M/S
DOP CALC LVOT STROKE VOLUME: 77.4 CM3
DOP CALCLVOT PEAK VEL VTI: 28.5 CM
E WAVE DECELERATION TIME: 331.51 MSEC
E/A RATIO: 0.64
ECHO LV POSTERIOR WALL: 1.19 CM (ref 0.6–1.1)
EJECTION FRACTION: 73 %
FRACTIONAL SHORTENING: 40 % (ref 28–44)
INTERVENTRICULAR SEPTUM: 1.19 CM (ref 0.6–1.1)
IVC DIAMETER: 0.94 CM
IVRT: 138.41 MSEC
LA MAJOR: 4.83 CM
LEFT ATRIUM SIZE: 3.7 CM
LEFT INTERNAL DIMENSION IN SYSTOLE: 1.51 CM (ref 2.1–4)
LEFT VENTRICLE DIASTOLIC VOLUME: 23.04 ML
LEFT VENTRICLE SYSTOLIC VOLUME: 6.2 ML
LEFT VENTRICULAR INTERNAL DIMENSION IN DIASTOLE: 2.53 CM (ref 3.5–6)
LEFT VENTRICULAR MASS: 85.61 G
LVOT MG: 3.66 MMHG
LVOT MV: 0.92 CM/S
MV PEAK A VEL: 1 M/S
MV PEAK E VEL: 0.64 M/S
MV STENOSIS PRESSURE HALF TIME: 96.14 MS
MV VALVE AREA P 1/2 METHOD: 2.29 CM2
PISA AR MAX VEL: 4.97 M/S
PISA TR MAX VEL: 1.3 M/S
PV MV: 0.98 M/S
PV PEAK VELOCITY: 1.34 CM/S
RA MAJOR: 4.93 CM
RA PRESSURE: 3 MMHG
RA WIDTH: 2.5 CM
RIGHT VENTRICULAR END-DIASTOLIC DIMENSION: 2.04 CM
TR MAX PG: 7 MMHG
TR MEAN GRADIENT: 0 MMHG
TV PEAK E VEL: 0.6 M/S
TV REST PULMONARY ARTERY PRESSURE: 10 MMHG

## 2023-04-19 ENCOUNTER — TELEPHONE (OUTPATIENT)
Dept: GYNECOLOGIC ONCOLOGY | Facility: CLINIC | Age: 73
End: 2023-04-19
Payer: MEDICARE

## 2023-04-19 NOTE — TELEPHONE ENCOUNTER
----- Message from Kimberly Tobar sent at 4/19/2023  4:19 PM CDT -----  Type:  Patient Returning Call    Who Called:     Who Left Message for Patient:     Does the patient know what this is regarding?: missed call     Best Call Back Number: [phone] / marcella / reji     Additional Information:

## 2023-04-19 NOTE — TELEPHONE ENCOUNTER
Called pt back and discussed with her about rescheduling,  labs and virtual visit.  Pt states she would like to hold off on next tx.   I told her I would still like to schedule a virtual visit with Dr Luque to discuss some of her concerns, and refill requests.   She stated I will need to set it up with her daughter.   I left another message with Susana,  her daughter to call us back to set up a virtual visit for tomorrow.

## 2023-04-20 ENCOUNTER — TELEPHONE (OUTPATIENT)
Dept: GYNECOLOGIC ONCOLOGY | Facility: CLINIC | Age: 73
End: 2023-04-20
Payer: MEDICARE

## 2023-04-20 NOTE — TELEPHONE ENCOUNTER
Spoke with our patient and her Daughter Susana  about her virtual  schedule appointment she voiced understanding of the date, time and location. All questions answered appointment mail. Provider Scheduling Coord.  Gynecologic Oncology MA/PAR /Preceptor Cam Verma

## 2023-04-25 ENCOUNTER — OFFICE VISIT (OUTPATIENT)
Dept: GYNECOLOGIC ONCOLOGY | Facility: CLINIC | Age: 73
End: 2023-04-25
Payer: MEDICARE

## 2023-04-25 DIAGNOSIS — N32.81 OVERACTIVE BLADDER: ICD-10-CM

## 2023-04-25 DIAGNOSIS — Z01.818 EXAMINATION PRIOR TO CHEMOTHERAPY: ICD-10-CM

## 2023-04-25 DIAGNOSIS — C48.2 PRIMARY PERITONEAL ADENOCARCINOMA: Primary | ICD-10-CM

## 2023-04-25 DIAGNOSIS — C56.9 MALIGNANT NEOPLASM OF OVARY, UNSPECIFIED LATERALITY: ICD-10-CM

## 2023-04-25 PROCEDURE — 99214 OFFICE O/P EST MOD 30 MIN: CPT | Mod: 95,,, | Performed by: OBSTETRICS & GYNECOLOGY

## 2023-04-25 PROCEDURE — 99214 PR OFFICE/OUTPT VISIT, EST, LEVL IV, 30-39 MIN: ICD-10-PCS | Mod: 95,,, | Performed by: OBSTETRICS & GYNECOLOGY

## 2023-04-25 RX ORDER — OXYBUTYNIN CHLORIDE 5 MG/1
5 TABLET ORAL 2 TIMES DAILY
Qty: 180 TABLET | Refills: 3 | Status: SHIPPED | OUTPATIENT
Start: 2023-04-25 | End: 2024-04-24

## 2023-04-25 RX ORDER — ROPINIROLE 0.5 MG/1
1.5 TABLET, FILM COATED ORAL NIGHTLY
Qty: 270 TABLET | Refills: 0 | Status: SHIPPED | OUTPATIENT
Start: 2023-04-25 | End: 2023-06-20

## 2023-04-25 NOTE — PROGRESS NOTES
"Subjective:      Patient ID: Murlene Mary Haase is a 73 y.o. female.    Chief Complaint: No chief complaint on file.  The patient location is: home  The chief complaint leading to consultation is: prechemo    Visit type: audiovisual    Face to Face time with patient: 15  30 minutes of total time spent on the encounter, which includes face to face time and non-face to face time preparing to see the patient (eg, review of tests), Obtaining and/or reviewing separately obtained history, Documenting clinical information in the electronic or other health record, Independently interpreting results (not separately reported) and communicating results to the patient/family/caregiver, or Care coordination (not separately reported).         Each patient to whom he or she provides medical services by telemedicine is:  (1) informed of the relationship between the physician and patient and the respective role of any other health care provider with respect to management of the patient; and (2) notified that he or she may decline to receive medical services by telemedicine and may withdraw from such care at any time.    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 She was on niraparib maintenance 200 mg daily. Started in March 2021. In Sep 2021 she asked to stop it because of worsening fatigue and GI upset.      Sep 2021: : 11. Previously 8. She is feeling better off niraparib. Also saw a GI doctor and started on glutamine and bentyl. More active since off niraparib. Also bloating and gas have resolved. Back to "running".      Sees Dr. Tana Deutsch her medical oncologist. But reports having not seen her in a while.       Last visit 3/2022:  is 21 3/2022  Discontinued niraparib previously. Wants to focus on quality of life.   No evidence of disease on today's exam.    21, some slight elevation from baseline. Does not desire any further interventions (CT, etc) at this " time.   She overall feels well and is asymptomatic.   Plan for RTC in 3 months with  or sooner if needed.         6/14/2022 with continued upward trend 21>31. She desired to continue to follow the trend.    29> 40> 54 12/1/2022  CT 12/7/2022 shows progressive disease  - 5.6 x 2.9 x 3.7 cm heterogeneously enhancing mass in the jessenia hepatis, enlarged from previous exams and consistent with metastatic katia disease.    - 3.1 x 2.0 x 3.1 cm similar appearing ovoid mass within the right iliac chain, also increased.  Last platinum 3/2021, previous carbo allergy.     Last platinum chemotherapy being March 2021.  She did have a carbo reaction during that session of chemotherapy.  Ideally would like to use platinum-based given platinum sensitive and we discussed carboplatin desensitization protocols. Discussed carbo + Taxol or doxil +/- Avastin. 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 Significant IBS/GI issues and after discussions of risks and benefits of avastin will not pursue at this time.      Carbo desensitization AUC4/Doxil 30mg/m2   Echo 1/3/2023 EF 75%   C1- 1/5  C2- 2/23, discontinued doxil due to fatigue intolerable side effects with doublet therapy  C3- 3/23  CT 3/31/2023 shows partial response      Today's visit:  Presents today consideration of C4.  Labs pending.   48>25>16> 11  Several complaints today, none dose limiting. Endorses difficulty sleeping, restless leg and overactive bladder. Self delayed treatment several weeks due to fatigue. She understands deviation from care by not trying to adhere to q21 days chemotherapy. Declines supportive interventions such as a stimulant for cancer associated fatigue. Is amenable to establishing with palliative care.   Inquired about stopping with 3 cycles of chemotherapy since  is normal. I also explained the standard of care if to continue chemotherapy if able as she has persistent disease by imaging, chemoresistance, etc.       ____________________________    Her oncologic history is:    July 2013: She was taken to the operating room on July 25, 2013 for preoperative diagnosis consistent with ovarian cancer. 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 At that time she underwent an omentectomy and bilateral salpingo-oophorectomy however she had bulky residual disease along the diaphragms as well as paracolic gutters and pelvis.    She was then treated with 3 cycles of dose dense paclitaxel and carboplatin. A CT scan obtained after this showed the following:    Subcentimeter hepatic lesions, too small to accurately characterize.    Subtle, increased soft tissue attenuation along the periphery of the right hepatic lobe that is nonspecific but is favored to represent a prominent right hemidiaphragm. Peritoneal thickening is thought to be less likely but cannot be entirely excluded   and correlation with prior imaging studies is advised if available.     Large amount of stool throughout entire colon.    L1 vertebral body compression fracture of unknown chronicity.   Small pericardial effusion.    She then received 2 more cycles of dose dense paclitaxel and carboplatin and then received one cycle of standard Taxol and carboplatin.     Dec 2013:  Her last chemotherapy was December 26, 2013.    At the completion of 6 cycles of postoperative Taxol and carboplatin, her  was 7 and her CT scan of the abdomen and pelvis showed no evidence for disease.        Feb 2016,  was 24.May 2016  was 35. June 2016: 33.   May 2016: CT scan abdomen and pelvis was negative.   August 2016:  was 31.  Nov 2016: C A 125 36  Feb 2017:  56. She has had a mildly elevated  in the 30s with negative scanning.   Feb 2017:  was 56. CT scan showed possible intussusception and Pelvis:  2.0 x 0.9 cm soft tissue attenuating focus in the anterior mid pelvis that abuts bowel, and is present on arterial/delayed  phases.      PET was done and shows adenopathy as well.   June 2017: Completed 6 cycles of taxol and carboplatin on 6/29/2017 for recurrent ovarian cancer.  Received chemotherapy with Dr. Tana Deutsch.  CT scan after completion of 6 cycles of taxol and carboplatin shows stable right pelvic lymph node index lesion measuring 0.7 cm with some prominent lymph nodes in the jessenia hepatis, unchanged from prior.  There is no new lymphadenopathy identified.   was 8.       Oct 2017:  She tried Niraparib but had a reaction to it on two occasions and stopped this. Started with 3 tablets day one and had SOB and chest pain. Waited several days and restarted with 1 tablet. Had the same symptoms and did not want to resume.      May 2020:  is 36.     Nov 2020: : 45. CT scan AP: 2.5 cm soft tissue mass in the jessenia hepatis does abut the pancreas though favored to be an enlarged node.  There is an additional enlarged node in the right hemipelvis.  Findings concerning for metastatic disease. Started taxol and carboplatin.      Jan 2021: She has completed 3 cycles of chemotherapy. Taxol and carboplatin x 2 and taxol x 1 due to carboplatin reaction at time of C3.   : 15. CT scan AP:  Previously identified soft tissue mass/lymph node in the jessenia hepatitis measures 1.5 cm (axial series 2, image 32), previously measured 2.5 cm. Previously identified soft tissue nodule/lymph node along the course of the right external iliac measures 1.1 cm (axial series 2, image 88), previously measured 1.7 cm (axial series 2, image 100).     Mar 2021: has completed 6 cycles of chemotherapy.  Taxol and carboplatin x 2 and taxol x 4 due to carboplatin reaction at time of C3.: 10. Last chemotherapy was 3/3/21. CT scan AP: jessenia hepatis node 1.3 cm, pelvic node 9 mm. She has a BRCA 2 mutation.Started niraparib 200 mg daily.      Patient with a BRCA 2 mutation.   No available tissue for STRATA testing as her surgery was in 2013.        Colonoscopy: Aug 11, 2016: inflammation and diverticulosis  EGD: Aug 11, 2016: normal   Review of Systems   Constitutional:  Positive for fatigue. Negative for appetite change, chills and fever.   HENT:  Negative for mouth sores.    Respiratory:  Negative for cough and shortness of breath.    Cardiovascular:  Negative for leg swelling.   Gastrointestinal:  Negative for abdominal pain, blood in stool, constipation and diarrhea.   Endocrine: Negative for cold intolerance.   Genitourinary:  Positive for urgency. Negative for dysuria and vaginal bleeding.   Musculoskeletal:  Negative for myalgias.   Skin:  Negative for rash.   Allergic/Immunologic: Negative.    Neurological:  Negative for weakness and numbness.   Hematological:  Negative for adenopathy. Does not bruise/bleed easily.   Psychiatric/Behavioral:  Negative for confusion.      Objective:   Physical Exam:   Constitutional: She is oriented to person, place, and time. She appears well-developed and well-nourished. No distress.    HENT:   Head: Normocephalic and atraumatic.    Eyes: EOM are normal.      Pulmonary/Chest: Effort normal.                  Musculoskeletal: Moves all extremeties.       Neurological: She is alert and oriented to person, place, and time.    Skin: Skin is dry. No pallor.    Psychiatric: She has a normal mood and affect. Her behavior is normal. Judgment and thought content normal.     Assessment:     1. Primary peritoneal adenocarcinoma    2. Examination prior to chemotherapy    3. Overactive bladder    4. Malignant neoplasm of ovary, unspecified laterality        Plan:     Orders Placed This Encounter   Procedures    Ambulatory referral/consult to [facility] Palliative Care     Counseling regarding cancer directed therapies as above.   Okay to treat C4 pending labs.   Increase requip and oxybutynin.  RTC prechemo or sooner if needed.

## 2023-04-26 ENCOUNTER — LAB VISIT (OUTPATIENT)
Dept: LAB | Facility: HOSPITAL | Age: 73
End: 2023-04-26
Attending: NURSE PRACTITIONER
Payer: MEDICARE

## 2023-04-26 DIAGNOSIS — R06.09 DYSPNEA ON EXERTION: Primary | ICD-10-CM

## 2023-04-26 DIAGNOSIS — R53.83 FATIGUE: ICD-10-CM

## 2023-04-26 LAB
FERRITIN SERPL-MCNC: 95 NG/ML (ref 20–300)
FOLATE SERPL-MCNC: 32.8 NG/ML (ref 4–24)
IRON SATN MFR SERPL: 26 % (ref 20–50)
IRON SERPL-MCNC: 72 UG/DL (ref 30–160)
TOTAL IRON BINDING CAPACITY: 276 UG/DL (ref 250–450)
VIT B12 SERPL-MCNC: 205 PG/ML (ref 210–950)

## 2023-04-26 PROCEDURE — 82728 ASSAY OF FERRITIN: CPT | Performed by: NURSE PRACTITIONER

## 2023-04-26 PROCEDURE — 36415 COLL VENOUS BLD VENIPUNCTURE: CPT | Performed by: NURSE PRACTITIONER

## 2023-04-26 PROCEDURE — 83540 ASSAY OF IRON: CPT | Performed by: NURSE PRACTITIONER

## 2023-04-26 PROCEDURE — 83921 ORGANIC ACID SINGLE QUANT: CPT | Performed by: NURSE PRACTITIONER

## 2023-04-26 PROCEDURE — 83550 IRON BINDING TEST: CPT | Performed by: NURSE PRACTITIONER

## 2023-04-26 PROCEDURE — 82607 VITAMIN B-12: CPT | Performed by: NURSE PRACTITIONER

## 2023-04-26 PROCEDURE — 82746 ASSAY OF FOLIC ACID SERUM: CPT | Performed by: NURSE PRACTITIONER

## 2023-04-27 DIAGNOSIS — C56.9 MALIGNANT NEOPLASM OF OVARY, UNSPECIFIED LATERALITY: ICD-10-CM

## 2023-04-27 DIAGNOSIS — N32.81 OVERACTIVE BLADDER: ICD-10-CM

## 2023-04-27 RX ORDER — OXYBUTYNIN CHLORIDE 5 MG/1
5 TABLET ORAL 2 TIMES DAILY
Qty: 180 TABLET | Refills: 3 | OUTPATIENT
Start: 2023-04-27 | End: 2024-04-26

## 2023-04-27 RX ORDER — ROPINIROLE 0.5 MG/1
1.5 TABLET, FILM COATED ORAL NIGHTLY
Qty: 270 TABLET | Refills: 0 | OUTPATIENT
Start: 2023-04-27 | End: 2023-07-26

## 2023-04-28 ENCOUNTER — TELEPHONE (OUTPATIENT)
Dept: GYNECOLOGIC ONCOLOGY | Facility: CLINIC | Age: 73
End: 2023-04-28
Payer: MEDICARE

## 2023-04-28 NOTE — TELEPHONE ENCOUNTER
----- Message from Kimberly Tobar sent at 4/28/2023 11:39 AM CDT -----   Name of Who is Calling:     What is the request in detail:  patient calling  in reference to medications   rOPINIRole (REQUIP) 0.5 MG tablet and   oxybutynin (DITROPAN) 5 MG Tab  are not at pharmacy   Please contact to further discuss and advise      Can the clinic reply by [facility]:     What Number to Call Back if not in [facility]: [phone]

## 2023-04-28 NOTE — TELEPHONE ENCOUNTER
----- Message from Kimberly Tobar sent at 4/28/2023 12:31 PM CDT -----  Type:  Patient Returning Call    Who Called:     Who Left Message for Patient:     Does the patient know what this is regarding?: missed call     Best Call Back Number:  [phone]    Additional Information:  patient request please put information in [facility] in reference to medication denial

## 2023-05-02 ENCOUNTER — TELEPHONE (OUTPATIENT)
Dept: GYNECOLOGIC ONCOLOGY | Facility: CLINIC | Age: 73
End: 2023-05-02
Payer: MEDICARE

## 2023-05-02 LAB — METHYLMALONATE SERPL-SCNC: 0.3 UMOL/L

## 2023-05-03 ENCOUNTER — TELEPHONE (OUTPATIENT)
Dept: PALLIATIVE MEDICINE | Facility: CLINIC | Age: 73
End: 2023-05-03
Payer: MEDICARE

## 2023-05-04 ENCOUNTER — OFFICE VISIT (OUTPATIENT)
Dept: PALLIATIVE MEDICINE | Facility: CLINIC | Age: 73
End: 2023-05-04
Payer: MEDICARE

## 2023-05-04 DIAGNOSIS — Z71.89 ADVANCED CARE PLANNING/COUNSELING DISCUSSION: ICD-10-CM

## 2023-05-04 DIAGNOSIS — C48.2 PRIMARY PERITONEAL ADENOCARCINOMA: Primary | ICD-10-CM

## 2023-05-04 DIAGNOSIS — Z51.5 ENCOUNTER FOR PALLIATIVE CARE: ICD-10-CM

## 2023-05-04 DIAGNOSIS — R14.0 BLOATING SYMPTOM: ICD-10-CM

## 2023-05-04 DIAGNOSIS — G89.3 CANCER RELATED PAIN: ICD-10-CM

## 2023-05-04 DIAGNOSIS — F43.23 ADJUSTMENT DISORDER WITH MIXED ANXIETY AND DEPRESSED MOOD: ICD-10-CM

## 2023-05-04 DIAGNOSIS — G47.00 INSOMNIA, UNSPECIFIED TYPE: ICD-10-CM

## 2023-05-04 DIAGNOSIS — R53.0 NEOPLASTIC (MALIGNANT) RELATED FATIGUE: ICD-10-CM

## 2023-05-04 DIAGNOSIS — M79.7 FIBROMYALGIA: ICD-10-CM

## 2023-05-04 DIAGNOSIS — K58.2 IRRITABLE BOWEL SYNDROME WITH BOTH CONSTIPATION AND DIARRHEA: ICD-10-CM

## 2023-05-04 PROCEDURE — 99417 PROLNG OP E/M EACH 15 MIN: CPT | Mod: 95,,, | Performed by: STUDENT IN AN ORGANIZED HEALTH CARE EDUCATION/TRAINING PROGRAM

## 2023-05-04 PROCEDURE — 99205 PR OFFICE/OUTPT VISIT, NEW, LEVL V, 60-74 MIN: ICD-10-PCS | Mod: 95,,, | Performed by: STUDENT IN AN ORGANIZED HEALTH CARE EDUCATION/TRAINING PROGRAM

## 2023-05-04 PROCEDURE — 99417 PR PROLONGED SVC, OUTPT, W/WO DIRECT PT CONTACT,  EA ADDTL 15 MIN: ICD-10-PCS | Mod: 95,,, | Performed by: STUDENT IN AN ORGANIZED HEALTH CARE EDUCATION/TRAINING PROGRAM

## 2023-05-04 PROCEDURE — 99205 OFFICE O/P NEW HI 60 MIN: CPT | Mod: 95,,, | Performed by: STUDENT IN AN ORGANIZED HEALTH CARE EDUCATION/TRAINING PROGRAM

## 2023-05-04 RX ORDER — CETIRIZINE HYDROCHLORIDE 10 MG/1
10 TABLET ORAL DAILY
Qty: 90 TABLET | Refills: 0 | Status: SHIPPED | OUTPATIENT
Start: 2023-05-04 | End: 2024-05-03

## 2023-05-04 NOTE — PROGRESS NOTES
Consult Note  Palliative Care    The patient location is: home  The chief complaint leading to consultation is: symptom management and ACP    Visit type: audiovisual    Face to Face time with patient: 50 minutes    90 minutes of total time spent on the encounter, which includes face to face time and non-face to face time preparing to see the patient (eg, review of tests), Obtaining and/or reviewing separately obtained history, Documenting clinical information in the electronic or other health record, Independently interpreting results (not separately reported) and communicating results to the patient/family/caregiver, or Care coordination (not separately reported).         Each patient to whom he or she provides medical services by telemedicine is:  (1) informed of the relationship between the physician and patient and the respective role of any other health care provider with respect to management of the patient; and (2) notified that he or she may decline to receive medical services by telemedicine and may withdraw from such care at any time.    Consult Requested By: Dr. Rhonda Luque  Reason for Consult: symptom management and ACP      ASSESSMENT/PLAN:     Plan/Recommendations:  Diagnoses and all orders for this visit:    Primary peritoneal adenocarcinoma  - followed by Dr. Luque  - initial diagnosis in 2013; please see oncology notes for full details of treatment  - currently on disease directed therapy    Encounter for palliative care/Advanced care planning  - patient decisional  - patient by herself today on telemedicine visit  - no ACP documents uploaded into EMR  - philosophy of Palliative Medicine reviewed with patient today  - new patient folder information will be sent in mail to patient after completion of visit  - during discussion of ACP booklet; patient states she has completed ACP documents already.   - patient verbally named her daughter Randee as her HCPOA    Cancer related pain/Bloating  "symptom/Irritable bowel syndrome with both constipation and diarrhea/Fibromyalgia  - patient reporting having increased symptoms of abdominal pain along with pain from her fibromyalgia. She has also been having increased bloating and irritability in her abdomen from IBS  - patient declines referral for PT or  PT at this time  - will prescribe simethicone for increased bloating/abdominal pain today  - no need for opioid medication at this time  - will continue to monitor    Adjustment disorder with mixed anxiety and depressed mood  - patient reporting worsening feeling of anxiety/depression and overall increased mental health needs  - patient states that she wants peace and and doesn't want to ask God "why". She states it is harder to deal with all aspects of her cancer as this is the 4th time she is dealing with the disease  - emotional support provided along with Pall Med [facility]; please see  note for full details  - continue remeron at this time  - will refer patient to Dr. Alarcon for talk therapy    Neoplastic (malignant) related fatigue/Insomnia  - patient with worsening insomnia and overall fatigue  - patient trying to stay active but she is sometimes unable to complete all activities that she wants to  - patient reports that she has some racing thoughts at night as well as having pain that keeps her up  - continue ambien as previously prescribed  - continue remeron as previously prescribed  - will also prescribe zyrtec today to help with sinus symptoms that are contributing to fatigue/insomnia    Understanding of illness/Prognosis: patient with good understanding of illness; prognosis is guarded    Goals of care: life prolonging    Follow up: ~ 6 weeks    Patient's encounter and above plan of care discussed with patient's oncologist    SUBJECTIVE:     History of Present Illness:  Patient is a 73 y.o. year old female with fibromyalgia, insomnia, and primary peritoneal carcinomatosis presents to Palliative " Medicine for symptom management and ACP. Please see oncology notes for full details of oncologic history and treatment course    05/04/2023:  LA  reviewed and summarized:  04/14/2023 Alprazolam 0.25 mg Disp: 180 for 90 days  04/04/2023 Hydrocodone-apap 5-325 mg disp: 12 for 2 days  03/22/2023 Zolpidem tartrate 5 mg Disp: 30 for 30 days    Patient reporting that she has been dealing with her diagnosis for past 10 years, and this is her fourth time receiving treatment. Patient reports that it is most important for her to feel better. Overall patient states it is harder this time dealing with her pain, fatigue, insomnia, fibromyalgia, and IBS. Patient is open to referral to for mental health services.     Past Medical History:   Diagnosis Date    Arthritis     BRCA2 gene mutation positive in female 4/24/2018    Cataract     BILATERAL    Elevated cancer antigen 125 (CA-125) 5/18/2016    Fibromyalgia     Gastritis     History of chemotherapy     Insomnia due to medical condition 6/29/2021    Neuromuscular disorder     Osteopenia     Primary insomnia 1/3/2020    Primary peritoneal carcinomatosis 7/26/2013 AND 2017    Stage IIIC subopitimally debulked.     Well woman exam with routine gynecological exam 2/10/2015     Past Surgical History:   Procedure Laterality Date    APPENDECTOMY      BREAST BIOPSY      CHOLECYSTECTOMY      CYST REMOVAL      left ear, right breast    HYSTERECTOMY      vaginal/prolapse    IL REMOVAL OF OVARY/TUBE(S)      SALPINGOOPHORECTOMY  7/25/13    laparotomy     TONSILLECTOMY       Family History   Problem Relation Age of Onset    Cancer Mother         gastric cancer    Heart disease Father 69        MI    Cancer Paternal Aunt         gastric cancer    Ovarian cancer Neg Hx     Uterine cancer Neg Hx     Breast cancer Neg Hx     Colon cancer Neg Hx      Review of patient's allergies indicates:   Allergen Reactions    Carboplatin Hives and Shortness Of Breath    Diflucan [fluconazole] Rash        Medications:    Current Outpatient Medications:     LIDOcaine-prilocaine (EMLA) cream, Apply topically as needed., Disp: 30 g, Rfl: 1    loratadine (CLARITIN) 10 mg tablet, Take 10 mg by mouth., Disp: , Rfl:     mirtazapine (REMERON) 30 MG tablet, Take 1 tablet (30 mg total) by mouth nightly., Disp: 90 tablet, Rfl: 3    ondansetron (ZOFRAN-ODT) 8 MG TbDL, Take 1 tablet (8 mg total) by mouth every 8 (eight) hours as needed (chemotherapy induced nausea)., Disp: 60 tablet, Rfl: 0    oxybutynin (DITROPAN) 5 MG Tab, Take 1 tablet (5 mg total) by mouth 2 (two) times daily., Disp: 180 tablet, Rfl: 3    PAXLOVID, EUA, 300 mg (150 mg x 2)-100 mg copackaged tablets (EUA), , Disp: , Rfl:     rOPINIRole (REQUIP) 0.5 MG tablet, Take 3 tablets (1.5 mg total) by mouth every evening., Disp: 270 tablet, Rfl: 0    zolpidem (AMBIEN) 5 MG Tab, Take 1 tablet (5 mg total) by mouth nightly as needed (insomnia)., Disp: 30 tablet, Rfl: 0    ALPRAZolam (XANAX) 0.25 MG tablet, Take 1 tablet (0.25 mg total) by mouth 2 (two) times daily as needed for Anxiety., Disp: 180 tablet, Rfl: 3    cetirizine (ZYRTEC) 10 MG tablet, Take 1 tablet (10 mg total) by mouth once daily., Disp: 90 tablet, Rfl: 0    EScitalopram oxalate (LEXAPRO) 10 MG tablet, Take 1 tablet (10 mg total) by mouth every evening., Disp: 30 tablet, Rfl: 2    simethicone 125 mg Tab, Take 125 mg by mouth before meals as needed., Disp: 270 tablet, Rfl: 0  No current facility-administered medications for this visit.    Facility-Administered Medications Ordered in Other Visits:     LIDOcaine (PF) 10 mg/ml (1%) injection 10 mg, 1 mL, Intradermal, Once, Ward Abraham MD    LIDOcaine (PF) 10 mg/ml (1%) injection 5 mg, 0.5 mL, Other, Once, Ward Abraham MD    phenylephrine-ketorolac (OMIDRIA) 1-0.3 % 500 mL irrigation, 1 each, Ocular Irrigation, Once, Ward Abraham MD    OBJECTIVE:       ROS:  Review of Systems   Constitutional:  Positive for activity change, appetite  change and fatigue.   HENT:  Positive for congestion.    Eyes: Negative.    Respiratory: Negative.     Cardiovascular: Negative.    Gastrointestinal:  Positive for abdominal pain, constipation and diarrhea.   Genitourinary: Negative.    Musculoskeletal:  Positive for arthralgias and myalgias.   Skin: Negative.    Neurological: Negative.    Psychiatric/Behavioral:  Positive for dysphoric mood and sleep disturbance.    All other systems reviewed and are negative.    Review of Symptoms      Symptom Assessment (ESAS 0-10 Scale)  Pain:  4  Dyspnea:  0  Anxiety:  0  Nausea:  0  Depression:  0  Anorexia:  0  Fatigue:  7  Insomnia:  7  Restlessness:  0  Agitation:  0     CAM / Delirium:  Negative  Constipation:  Negative  Diarrhea:  Positive    Constipation:  Constipation    Bowel Management Plan (BMP):  No      Pain Assessment:  OME in 24 hours:  0  Location(s): abdomen    Abdomen       Location: generalized        Quality: Cramping and dull        Quantity: 4/10 in intensity        Chronicity: Onset 1 (greater than) month(s) ago, gradually worsening        Aggravating Factors: Eating        Alleviating Factors: None        Associated Symptoms: None    Modified Chuck Scale:  0    ECOG Performance Status ndGndrndanddndend:nd nd2nd Living Arrangements:  Lives with family    Psychosocial/Cultural:   See Palliative Psychosocial Note: Yes  Patient lives with her brother. Patient enjoys being active - shopping, visiting/helping people; gardening; reading the bible  **Primary  to Follow**  Palliative Care  Consult: No    Spiritual:  F - Carli and Belief:  Yes  I - Importance:  Very  C - Community:  Yes  A - Address in Care:  Needs met    Advance Care Planning   Advance Directives:   Living Will: No    [place]: No    Do Not Resuscitate Status: No    Medical Power of : No        Oral Declaration: Yes  Agent's Name:  Susana Garcia   Agent's Contact Number:  [phone]    Decision Making:  Patient answered  questions  Goals of Care: The patient endorses that what is most important right now is to focus on remaining as independent as possible, symptom/pain control, and extending life as long as possible    Accordingly, we have decided that the best plan to meet the patient's goals includes continuing with treatment        Physical Exam:  Vitals: BP:  (virtual) (05/04/23 0859)  Physical Exam  Constitutional:       General: She is not in acute distress.     Appearance: She is not toxic-appearing.   HENT:      Head: Normocephalic and atraumatic.      Right Ear: External ear normal.      Left Ear: External ear normal.      Nose: Nose normal.      Mouth/Throat:      Mouth: Mucous membranes are moist.   Eyes:      General: No scleral icterus.        Right eye: No discharge.         Left eye: No discharge.   Pulmonary:      Effort: Pulmonary effort is normal. No respiratory distress.   Musculoskeletal:      Cervical back: Normal range of motion.      Comments: Sitting up in chair; able to walk into view of camera without problems; moves upper extremities without difficulty   Skin:     Coloration: Skin is not jaundiced or pale.      Findings: No rash.   Neurological:      General: No focal deficit present.      Mental Status: She is alert and oriented to person, place, and time.      Cranial Nerves: No cranial nerve deficit.   Psychiatric:         Behavior: Behavior normal.         Thought Content: Thought content normal.         Judgment: Judgment normal.       Labs:  CBC:   WBC   Date Value Ref Range Status   03/21/2023 7.20 3.90 - 12.70 K/uL Final     Hemoglobin   Date Value Ref Range Status   03/21/2023 13.1 12.0 - 16.0 g/dL Final     Hematocrit   Date Value Ref Range Status   03/21/2023 39.3 37.0 - 48.5 % Final     MCV   Date Value Ref Range Status   03/21/2023 97 82 - 98 fL Final     Platelets   Date Value Ref Range Status   03/21/2023 220 150 - 450 K/uL Final       LFT:   Lab Results   Component Value Date    AST 31  "03/21/2023    ALKPHOS 121 03/21/2023    BILITOT 0.2 03/21/2023       Albumin:   Albumin   Date Value Ref Range Status   03/21/2023 3.8 3.5 - 5.2 g/dL Final     Protein:   Total Protein   Date Value Ref Range Status   03/21/2023 7.7 6.0 - 8.4 g/dL Final       Radiology:I have reviewed all pertinent imaging results/findings within the past 24 hours.    03/31/2023 CT A/P: "Interval decrease in size of the katia masses at the portal hepatis and right iliac chain since 12/07/2022."    03/31/2023 CT chest: "No CT evidence of metastatic disease to the chest."    I spent a total of 90 minutes on the day of the visit.This includes face to face time in discussion of goals of care, symptom assessment, coordination of care and emotional support.  This also includes non-face to face time preparing to see the patient (eg, review of tests/imaging), obtaining and/or reviewing separately obtained history, documenting clinical information in the electronic or other health record, independently interpreting results and communicating results to the patient/family/caregiver, or care coordinator.       Signature: Joyce Franco MD      "

## 2023-05-04 NOTE — PROGRESS NOTES
Advance Care Planning   [facility] Palliative Medicine [facility]  Palliative Care   Psychosocial Assessment    Patient Name: Murlene Mary Haase  MRN: [id number]  Admission Date: (Not on file)  Attending Provider: No att. providers found  Palliative Care Provider: Joyce Franco MD   Primary Care Physician: Avani Abraham MD  Principal Problem: Peritoneal adenocarcinoma     Reason for Referral:  Advanced Care Planning and Psychosocial Support     Present during Interview: patient     Primary Language: English    Needed: no      Past Medical Situation:   PMH:   Past Medical History:   Diagnosis Date    Arthritis     BRCA2 gene mutation positive in female 4/24/2018    Cataract     BILATERAL    Elevated cancer antigen 125 (CA-125) 5/18/2016    Fibromyalgia     Gastritis     History of chemotherapy     Insomnia due to medical condition 6/29/2021    Neuromuscular disorder     Osteopenia     Primary insomnia 1/3/2020    Primary peritoneal carcinomatosis 7/26/2013 AND 2017    Stage IIIC subopitimally debulked.     Well woman exam with routine gynecological exam 2/10/2015     Mental Health/Substance Use History: Patient endorses history of anxiety   Risk of Abuse, neglect or exploitation: None identified   Current or Previous Trauma and/or evidence of PTSD: None identified   Non-traditional Health practices: None identified     Understanding of diagnosis and prognosis: Fair   Experience/Comfort level with health care system: Good     Patients Mental Status: Alert and oriented to person, place, time and situation and pleasant demeanor.     Socio-Economic Factors/Resources:  Address: [address] [place] [address]  Phone Number: [phone] (home)     Marital Status:   Household composition: Patient and brother/Freddie   Children: Daughter/Susana and son/Luis Enrique     Patient/Family perceptions about Caregiving Needs; availability and capacity: Patient reports having great support from family  "    Family Dynamics/Relationships: Healthy     Patient/Family Strengths/Resilience: Patient openly acknowledges her limitations while keeping a "positive attitude".   Patient/Family Coping: Fair.  Patient admits to anxiety regarding the future.  Patient amenable to follow up with Behavioral Health DSW    Activities of Daily Living:Independent   Support Systems-Family & Community (Home Health, HME etc): n/a     Transportation:  yes    Work/Education History: retired   Self-Care Activities/Hobbies:  "running the road" shopping, volunteering, gardening      History: no    Financial Resources:Medicare      Advanced Care Planning & Legal Concerns:   Advanced Directives/Living Will: no  [place]ST: no   Planning:  no    Power of : no    Emergency Contacts: Susana Salgadoey/Daughter     Spirituality, Culture & Coping Mechanisms:  F- Carli and Belief: Non-Anabaptist     I - Importance:  High     C - Community/Culture Values: Patient prefers independent practice      A - Address in Care: Patient would benefit from spiritual support as her disease has caused her to "question" God.       Goals/Hopes/Expectations: Patient hopes to "feel better physically and emotionally"   Fears/Anxiety/Concerns: Patient admits to having "trouble dealing" with the emotional and physical burdens of her disease.       Complicated Bereavement Risk Assessment Tool (CBRAT)  Reference:  [facility] Clinical Practice Group (May 2016). Bereavement Risk Screening and Management Guidelines.  Retrieved from: http://[email or web address].au/wp-content/uploads//BHLRW-Owblckfjmmh-Cdqhwnauc-and-Management-Guideline-2016.pdf      Bereaved Client Characteristics   Under 18      no  Was a Twin   no  Young Spouse   no  Elderly Spouse    no  Isolated    no  Lacks Meaningful Social Support   no  Dissatisfied with help available during illness   no  New to [employer] no  New to Decision-Making  " " no    Illness  Inherited Disorder   no  Stigmatized Disease in the family/community   no  Lengthy/Burdensome   yes     Bereaved Client's History of Loss   Cumulative Multiple Losses   no  Previous Mental Health Illnesses   no  Current Mental Health Illness   no  Other Significant Health Issues   no   Migrant/Refugee   no Death  Sudden or Unexpected   yes  Traumatic Circumstances Associated with Death   no  Significant Cultural/Social Burdens as a result of Death   yes   Relationship with   Profound Lifelong Partner   no  Highly Dependent    no  Antagonistic   no  Ambivalent   no  Deeply Connected   yes  Culturally Defined   yes   Risk Factors Scores  0-2  Low  3-5  Moderate  5+  High  All persons scoring moderate to high presume to be at risk**    (** It is acknowledged that protective factors and resilience may outweigh apparent risk factors.      Total Risk Factors Score:   Low to moderate     Advance Care Planning     Date: 2023    [facility]  I engaged the patient in a conversation about advance care planning and we specifically addressed what the goals of care would be moving forward, in light of the patient's change in clinical status, specifically peritoneal adenocarcinoma .  We did not specifically address the patient's likely prognosis, which is  TBD by Oncology .  We explored the patient's values and preferences for future care.  The patient endorses that what is most important right now is to focus on remaining as independent as possible, symptom/pain control, and improvement in "coping" with her disease.     Accordingly, we have decided that the best plan to meet the patient's goals includes continuing with treatment    I spent a total of 20 minutes engaging the patient in this advance care planning discussion.        Plan of Care:     SW accompanied MD during initial visit with patient.   Patient presents Oriented x4 with pleasant and appropriate mood. Patient appears to have a fair " "understanding of her illness. Patient admits to anxiety and insomnia. Patient reports experiencing difficulty accepting and "dealing" with her illness. Furthermore patient eludes to spiritual distress as she has "questioned" God's reason for her disease.  Patient reports having a long-standing history with anxiety which has been exacerbated by her disease. SW introduced mindfulness techniques with patient. Patient agreeable to follow up with Behavioral Health DSW.  Patient enjoys spending time gardening, "running the roads", shopping, and volunteering. Patient reports having good support from her brother and two children. Patient reports what is most important is "feeling (emotionally and physically) better". Patient denies further psychosocial concerns. SW remains available to provide emotional support and psychosocial assistance. SW will continue to follow.    Mayra Carbajal, [facility]  Outpatient   Palliative Medicine                     "

## 2023-05-08 ENCOUNTER — TELEPHONE (OUTPATIENT)
Dept: PALLIATIVE MEDICINE | Facility: CLINIC | Age: 73
End: 2023-05-08
Payer: MEDICARE

## 2023-05-08 NOTE — TELEPHONE ENCOUNTER
----- Message from Joyce Franco MD sent at 5/4/2023  3:54 PM CDT -----  Good afternoon,    Please schedule Ms. Haase with Amaya for virtual visit at next available new patient slot.    Thank you,  Joyce

## 2023-05-11 ENCOUNTER — TELEPHONE (OUTPATIENT)
Dept: PALLIATIVE MEDICINE | Facility: CLINIC | Age: 73
End: 2023-05-11
Payer: MEDICARE

## 2023-05-12 ENCOUNTER — OFFICE VISIT (OUTPATIENT)
Dept: PALLIATIVE MEDICINE | Facility: CLINIC | Age: 73
End: 2023-05-12
Payer: MEDICARE

## 2023-05-12 DIAGNOSIS — G47.00 INSOMNIA, UNSPECIFIED TYPE: ICD-10-CM

## 2023-05-12 DIAGNOSIS — F43.23 ADJUSTMENT DISORDER WITH MIXED ANXIETY AND DEPRESSED MOOD: Primary | ICD-10-CM

## 2023-05-12 PROCEDURE — 90791 PSYCH DIAGNOSTIC EVALUATION: CPT | Mod: 95,,, | Performed by: SOCIAL WORKER

## 2023-05-12 PROCEDURE — 90791 PR PSYCHIATRIC DIAGNOSTIC EVALUATION: ICD-10-PCS | Mod: 95,,, | Performed by: SOCIAL WORKER

## 2023-05-12 NOTE — PROGRESS NOTES
"Psychiatry Initial Visit (PhD/LCSW)  Diagnostic Interview - CPT 57962    Date: 5/12/2023  The patient location is: [place], [place]  The chief complaint leading to consultation is: Depression, Anxiety, and Insomnia    Visit type: audiovisual    Face to Face time with patient: 58  65 minutes of total time spent on the encounter, which includes face to face time and non-face to face time preparing to see the patient (eg, review of tests), Obtaining and/or reviewing separately obtained history, Documenting clinical information in the electronic or other health record, Independently interpreting results (not separately reported) and communicating results to the patient/family/caregiver, or Care coordination (not separately reported).     Each patient to whom he or she provides medical services by telemedicine is:  (1) informed of the relationship between the physician and patient and the respective role of any other health care provider with respect to management of the patient; and (2) notified that he or she may decline to receive medical services by telemedicine and may withdraw from such care at any time.    Site: [facility]    Referral source: Dr. Franco (Palliative Medicine)    Clinical status of patient: Outpatient    Murlene Mary Haase, a 73 y.o. female, for initial evaluation visit.  Met with patient.    Chief complaint/reason for encounter: depression, anxiety, and sleep    History of present illness:     LCSW and patient completed initial psychotherapy session this date. Patient has a dx of peritoneal adenocarcinoma, originally in 2013. Ruiz reports having difficult dealing with the recurrence of cancer and doing treatment. She states this is "round four" and is getting "harder and harder". First diagnosed 10 years ago and felt the first time through, it was a breeze and has gotten more difficult each round of treatments. Currently, receiving her infusion once a month and is experiencing more " "side effects. Feels tired and fatigued, but suffers from insomnia, and is unable to sleep. She expressed being disappointed and aggravated with the impact treatment has on her physically.     "Quality of life means a lot to me". "I am not going to be a person who just stays in bed".  Feels she has come to the decision that if she continues to have extensive side effects from chemo, she feels she is questioning whether the treatment will "continue to be worth it". She maintains a positive outlook and feels participating in therapy will be a beneficial outlet for her to express and process her emotions. Strong mike and finds prayer as a positive coping mechanism to address times of increased anxiety.     Endorses feelings of anxiety -  restlessness, fatigued,  muscle tightness, feeling constantly on edge, concentration difficulties, and a general state of irritability that all significantly impacts pt's daily life and functioning. Explored pt's anxiety and utilized anxiety reduction techniques in order to identify pt's realistic verus unrealistic thought processes.   Interested in a type of support group, potentially virtually or on social media. Pt receptive to psychotherapy and will return in two weeks.     Pain: 3    Symptoms:   Mood: depressed mood, diminished interest, insomnia, fatigue, worthlessness/guilt, and poor concentration  Anxiety: excessive anxiety/worry, restlessness/keyed up, and irritability  Substance abuse: denied  Cognitive functioning: denied  Health behaviors: noncontributory    Psychiatric history: none    Medical history:   Past Medical History:   Diagnosis Date    Arthritis     BRCA2 gene mutation positive in female 4/24/2018    Cataract     BILATERAL    Elevated cancer antigen 125 (CA-125) 5/18/2016    Fibromyalgia     Gastritis     History of chemotherapy     Insomnia due to medical condition 6/29/2021    Neuromuscular disorder     Osteopenia     Primary insomnia 1/3/2020    Primary " "peritoneal carcinomatosis 7/26/2013 AND 2017    Stage IIIC subopitimally debulked.     Well woman exam with routine gynecological exam 2/10/2015        Family history of psychiatric illness: none    Social history (marriage, employment, etc.):     Patient is , lives with her older brother and they provide each other with ongoing, strong support. She has a daughter and son who live nearby. "Plenty of grandchildren". Feels she used to be able to do more activities with her family and friends. States her children provide her with support when needed. She used to enjoy shopping, getting out of the house, and exercise. Used to walk and go running and is not able to do this any longer. She feels too tired to even go on walks which is discouraging. Enjoys gardening and is able to do this at this time. Strong mike and watches services on TV as she is not able to go to [facility] at this time. Her brother, who she lives with, is a retired preacher.     Substance use:   Alcohol: none   Drugs: none   Tobacco: none   Caffeine: Drinks coffee in the morning     Current medications and drug reactions (include OTC, herbal): see medication list     Strengths and liabilities: Strength: Patient accepts guidance/feedback, Strength: Patient is expressive/articulate., Liability: Patient has poor health., Liability: Patient lacks coping skills.    Current Evaluation:     Mental Status Exam:  General Appearance:  unremarkable, age appropriate   Speech: normal tone, normal rate, normal pitch, normal volume      Level of Cooperation: cooperative      Thought Processes: normal and logical   Mood: anxious, sad      Thought Content: normal, no suicidality, no homicidality, delusions, or paranoia   Affect: congruent and appropriate   Orientation: Oriented x3   Memory: recent >  intact   Attention Span & Concentration: intact   Fund of General Knowledge: intact and appropriate to age and level of education   Abstract Reasoning: interpretation " of similarities was concrete   Judgment & Insight: good     Language  intact     Diagnostic Impression - Plan:       ICD-10-CM ICD-9-CM   1. Adjustment disorder with mixed anxiety and depressed mood  F43.23 309.28   2. Insomnia, unspecified type  G47.00 780.52       Plan:individual psychotherapy    Return to Clinic: 2 weeks    Length of Service (minutes): 60

## 2023-05-15 ENCOUNTER — LAB VISIT (OUTPATIENT)
Dept: LAB | Facility: HOSPITAL | Age: 73
End: 2023-05-15
Attending: OBSTETRICS & GYNECOLOGY
Payer: MEDICARE

## 2023-05-15 ENCOUNTER — TELEPHONE (OUTPATIENT)
Dept: GYNECOLOGIC ONCOLOGY | Facility: CLINIC | Age: 73
End: 2023-05-15
Payer: MEDICARE

## 2023-05-15 DIAGNOSIS — Z15.01 BRCA2 GENE MUTATION POSITIVE IN FEMALE: ICD-10-CM

## 2023-05-15 DIAGNOSIS — Z15.02 BRCA2 GENE MUTATION POSITIVE IN FEMALE: ICD-10-CM

## 2023-05-15 DIAGNOSIS — Z15.09 BRCA2 GENE MUTATION POSITIVE IN FEMALE: ICD-10-CM

## 2023-05-15 DIAGNOSIS — C48.2 PRIMARY PERITONEAL ADENOCARCINOMA: ICD-10-CM

## 2023-05-15 LAB
ALBUMIN SERPL BCP-MCNC: 3.9 G/DL (ref 3.5–5.2)
ALP SERPL-CCNC: 82 U/L (ref 55–135)
ALT SERPL W/O P-5'-P-CCNC: 29 U/L (ref 10–44)
ANION GAP SERPL CALC-SCNC: 4 MMOL/L (ref 8–16)
AST SERPL-CCNC: 29 U/L (ref 10–40)
BILIRUB SERPL-MCNC: 0.5 MG/DL (ref 0.1–1)
BUN SERPL-MCNC: 22 MG/DL (ref 8–23)
CALCIUM SERPL-MCNC: 10 MG/DL (ref 8.7–10.5)
CANCER AG125 SERPL-ACNC: 12 U/ML (ref 0–30)
CHLORIDE SERPL-SCNC: 100 MMOL/L (ref 95–110)
CO2 SERPL-SCNC: 28 MMOL/L (ref 23–29)
CREAT SERPL-MCNC: 0.6 MG/DL (ref 0.5–1.4)
ERYTHROCYTE [DISTWIDTH] IN BLOOD BY AUTOMATED COUNT: 14.3 % (ref 11.5–14.5)
EST. GFR  (NO RACE VARIABLE): >60 ML/MIN/1.73 M^2
GLUCOSE SERPL-MCNC: 85 MG/DL (ref 70–110)
HCT VFR BLD AUTO: 43.2 % (ref 37–48.5)
HGB BLD-MCNC: 14.6 G/DL (ref 12–16)
IMM GRANULOCYTES # BLD AUTO: 0.07 K/UL (ref 0–0.04)
MAGNESIUM SERPL-MCNC: 2.4 MG/DL (ref 1.6–2.6)
MCH RBC QN AUTO: 31.7 PG (ref 27–31)
MCHC RBC AUTO-ENTMCNC: 33.8 G/DL (ref 32–36)
MCV RBC AUTO: 94 FL (ref 82–98)
NEUTROPHILS # BLD AUTO: 3.2 K/UL (ref 1.8–7.7)
PLATELET # BLD AUTO: 252 K/UL (ref 150–450)
PMV BLD AUTO: 9 FL (ref 9.2–12.9)
POTASSIUM SERPL-SCNC: 4 MMOL/L (ref 3.5–5.1)
PROT SERPL-MCNC: 8.1 G/DL (ref 6–8.4)
RBC # BLD AUTO: 4.61 M/UL (ref 4–5.4)
SODIUM SERPL-SCNC: 132 MMOL/L (ref 136–145)
WBC # BLD AUTO: 5.88 K/UL (ref 3.9–12.7)

## 2023-05-15 PROCEDURE — 36415 COLL VENOUS BLD VENIPUNCTURE: CPT | Performed by: OBSTETRICS & GYNECOLOGY

## 2023-05-15 PROCEDURE — 86304 IMMUNOASSAY TUMOR CA 125: CPT | Performed by: OBSTETRICS & GYNECOLOGY

## 2023-05-15 PROCEDURE — 80053 COMPREHEN METABOLIC PANEL: CPT | Performed by: OBSTETRICS & GYNECOLOGY

## 2023-05-15 PROCEDURE — 83735 ASSAY OF MAGNESIUM: CPT | Performed by: OBSTETRICS & GYNECOLOGY

## 2023-05-15 PROCEDURE — 85027 COMPLETE CBC AUTOMATED: CPT | Performed by: OBSTETRICS & GYNECOLOGY

## 2023-05-15 NOTE — TELEPHONE ENCOUNTER
----- Message from Davina Mc sent at 5/15/2023  2:07 PM CDT -----  Name of Who is Calling: HAASE, MURLENE MARY [[id number]]            What is the request in detail: Patient is requesting call back to reschedule appointments from 5/16 and 5/17            [facility] [phone]

## 2023-05-17 ENCOUNTER — TELEPHONE (OUTPATIENT)
Dept: GYNECOLOGIC ONCOLOGY | Facility: CLINIC | Age: 73
End: 2023-05-17
Payer: MEDICARE

## 2023-05-17 NOTE — TELEPHONE ENCOUNTER
----- Message from Shari Lin sent at 5/17/2023  8:53 AM CDT -----  Name of Who is Calling:Susana (Daughter)              What is the request in detail:Requesting a call back to reschedule appt.               Can the clinic reply by [facility]:no              What Number to Call Back [facility]:[phone]

## 2023-05-26 ENCOUNTER — TELEPHONE (OUTPATIENT)
Dept: PALLIATIVE MEDICINE | Facility: CLINIC | Age: 73
End: 2023-05-26
Payer: MEDICARE

## 2023-05-29 ENCOUNTER — OFFICE VISIT (OUTPATIENT)
Dept: PALLIATIVE MEDICINE | Facility: CLINIC | Age: 73
End: 2023-05-29
Payer: MEDICARE

## 2023-05-29 DIAGNOSIS — F43.23 ADJUSTMENT DISORDER WITH MIXED ANXIETY AND DEPRESSED MOOD: Primary | ICD-10-CM

## 2023-05-29 DIAGNOSIS — G47.00 INSOMNIA, UNSPECIFIED TYPE: ICD-10-CM

## 2023-05-29 DIAGNOSIS — M54.41 ACUTE BILATERAL LOW BACK PAIN WITH BILATERAL SCIATICA: ICD-10-CM

## 2023-05-29 DIAGNOSIS — M54.42 ACUTE BILATERAL LOW BACK PAIN WITH BILATERAL SCIATICA: ICD-10-CM

## 2023-05-29 DIAGNOSIS — C48.2 PRIMARY PERITONEAL ADENOCARCINOMA: Primary | ICD-10-CM

## 2023-05-29 PROCEDURE — 99442 PR PHYSICIAN TELEPHONE EVALUATION 11-20 MIN: CPT | Mod: 95,,, | Performed by: STUDENT IN AN ORGANIZED HEALTH CARE EDUCATION/TRAINING PROGRAM

## 2023-05-29 PROCEDURE — 99442 PR PHYSICIAN TELEPHONE EVALUATION 11-20 MIN: ICD-10-PCS | Mod: 95,,, | Performed by: STUDENT IN AN ORGANIZED HEALTH CARE EDUCATION/TRAINING PROGRAM

## 2023-05-29 PROCEDURE — 90837 PSYTX W PT 60 MINUTES: CPT | Mod: 95,,, | Performed by: SOCIAL WORKER

## 2023-05-29 PROCEDURE — 90837 PR PSYCHOTHERAPY W/PATIENT, 60 MIN: ICD-10-PCS | Mod: 95,,, | Performed by: SOCIAL WORKER

## 2023-05-29 RX ORDER — MELOXICAM 15 MG/1
15 TABLET ORAL DAILY
Qty: 10 TABLET | Refills: 0 | Status: SHIPPED | OUTPATIENT
Start: 2023-05-29 | End: 2023-06-08

## 2023-05-29 NOTE — PROGRESS NOTES
Established Patient - Audio Only Telehealth Visit     The patient location is: home  The chief complaint leading to consultation is: symptom management  Visit type: Virtual visit with audio only (telephone)  Total time spent with patient: 9 minutes       The reason for the audio only service rather than synchronous audio and video virtual visit was related to technical difficulties or patient preference/necessity.     Each patient to whom I provide medical services by telemedicine is:  (1) informed of the relationship between the physician and patient and the respective role of any other health care provider with respect to management of the patient; and (2) notified that they may decline to receive medical services by telemedicine and may withdraw from such care at any time. Patient verbally consented to receive this service via voice-only telephone call.       HPI: Patient is a 73 y.o. year old female with fibromyalgia, insomnia, and primary peritoneal carcinomatosis presents to Palliative Medicine for symptom management and ACP. Please see oncology notes for full details of oncologic history and treatment course.    Patient was being seen by Palliative Medicine DSW this morning, and she requested that a message be passed onto this provider regarding increased pain. Patient reporting that past two days she has had increased low back pain with radiation down both legs. She states this feels similar to when she had her sciatica pain previously. Patient reports the pain is intermittent but severe. Heating pad has made the pain worse. She previously used Piroxicam and chiropractor for pain relief. She has also been dealing with a sinus infection that she was seen at Urgent care for recently. She is taking antibiotics but still having sweats/chills.       Assessment and plan:    - will send in 10 day supply for meloxicam 15 mg today  - discussed safety use with acetaminophen including staying below total of 3 grams per  day  - encouraged patient to re-establish care with her PCP  - discussed safety use with ice packs including but not limited to using for 20 minutes at a time for next 24 hours as well as having cloth between ice pack and her skin.   - will follow up with patient as previously scheduled on 6/15/23    I spent a total of 15 minutes on the day of the visit.This includes face to face time in discussion of goals of care, symptom assessment, coordination of care and emotional support.  This also includes non-face to face time preparing to see the patient (eg, review of tests/imaging), obtaining and/or reviewing separately obtained history, documenting clinical information in the electronic or other health record, independently interpreting results and communicating results to the patient/family/caregiver, or care coordinator.      This service was not originating from a related E/M service provided within the previous 7 days nor will  to an E/M service or procedure within the next 24 hours or my soonest available appointment.  Prevailing standard of care was able to be met in this audio-only visit.

## 2023-06-02 ENCOUNTER — TELEPHONE (OUTPATIENT)
Dept: PALLIATIVE MEDICINE | Facility: CLINIC | Age: 73
End: 2023-06-02
Payer: MEDICARE

## 2023-06-05 ENCOUNTER — OFFICE VISIT (OUTPATIENT)
Dept: PALLIATIVE MEDICINE | Facility: CLINIC | Age: 73
End: 2023-06-05
Payer: MEDICARE

## 2023-06-05 DIAGNOSIS — F43.23 ADJUSTMENT DISORDER WITH MIXED ANXIETY AND DEPRESSED MOOD: Primary | ICD-10-CM

## 2023-06-05 PROCEDURE — 90837 PSYTX W PT 60 MINUTES: CPT | Mod: 95,,, | Performed by: SOCIAL WORKER

## 2023-06-05 PROCEDURE — 90837 PR PSYCHOTHERAPY W/PATIENT, 60 MIN: ICD-10-PCS | Mod: 95,,, | Performed by: SOCIAL WORKER

## 2023-06-05 NOTE — PROGRESS NOTES
"Individual Psychotherapy (PhD/LCSW)    6/5/2023    The patient location is: [place], [place]  The chief complaint leading to consultation is: Depression, Anxiety, Sleep    Visit type: audiovisual    Face to Face time with patient: 58  60 minutes of total time spent on the encounter, which includes face to face time and non-face to face time preparing to see the patient (eg, review of tests), Obtaining and/or reviewing separately obtained history, Documenting clinical information in the electronic or other health record, Independently interpreting results (not separately reported) and communicating results to the patient/family/caregiver, or Care coordination (not separately reported).     Each patient to whom he or she provides medical services by telemedicine is:  (1) informed of the relationship between the physician and patient and the respective role of any other health care provider with respect to management of the patient; and (2) notified that he or she may decline to receive medical services by telemedicine and may withdraw from such care at any time.      Site:  [facility]         Therapeutic Intervention: Met with patient.  Outpatient - Insight oriented psychotherapy 60 min - CPT code 68555 and Outpatient - Supportive psychotherapy 60 min - CPT Code 92774    Chief complaint/reason for encounter: depression and anxiety     Interval history and content of current session:     LCSW and patient completed follow up psychotherapy session this date, virtually. She presents as alert, oriented x3, with a pleasant affect. Continued issues with her sciatic nerve and also states her IBS has been acting up more the past week. She does state that she is planning to cancel/reschedule her chemo that is to be done this week. Ruiz feels "very strongly that it is destroying my body" and with other health concerns going on, she does not feel it is beneficial. Physically cannot make the drive. Feels weak, tired, in pain and " "knows chemo has worsened those symptoms lately.  "What do I allow to take me out, the disease or the chemicals?" Ongoing exploration of what is most important to patient at this time. She feels the chemo treatments have had a greater impact on her and she has not been able to "bounce back" and is unable to do what she enjoys.     Feels she is "just existing". Asked patient what "being better means". She states she wants to be able to go shopping, be outside, work in her garden. Ongoing discussion of quality of life and ways for patient to do activities, adapting to current physical limitations. She knows she cannot get down and garden. Encouraged patient to have her brother put a pot and soil on the outside table and that way she can still plant her flowers. Feels her anxiety and thoughts are worse at night when she is attempting to go to sleep. Discussed ways to improve sleep and calm her mind.     Explored pt's anxiety and utilized anxiety reduction techniques in order to identify pt's realistic verus unrealistic thought processes.   Goals discussed and reviewed. Pt goals are to reduce negative automatic thoughts, reduce physical symptoms of anxiety, reduce time spent worrying (<30 min/per day) , acquire awareness towards stress/anxiety, develop adaptive choices for problem solving and coping, and acquire relapse prevention skills. Pt receptive to psychotherapy. Wishes to return in 1 week.    Prior Visit: 5/29/23    She discussed how she desires to have peace surrounding decisions she makes surrounding her health. She had to reschedule her lab work and chemo due to fever, chills, and diarrhea. She knew she did not physically feel she could do treatment but questioned whether that was a good decision. She has her 4th treatment scheduled for 6/8. She will have 6 treatments total. She feels she becomes sicker after every treatment. Questioning, "Can my body handle more treatments? I know my body the best and worry I " "cannot". She is contemplating whether she wants to continue to treatment at this time. She see's oncology, Dr. Luque, on 6/7 and feels she will decide    Quality of Life includes "being able to go to [employer], light socialization, being able to be outdoors." Goal of patient to create an outdoor space so she is able to sit outside and enjoy nature which brings her a sense of peace. She reflects on how she wishes she could spend time outside and in her garden. When she has days where she is feeling worse, she finds herself aggravated and frustrated with her inability to do the household responsibilities. Discussed ways to adapt to physical changes and limitations.     Treatment plan:  Target symptoms: recurrent depression, anxiety   Why chosen therapy is appropriate versus another modality: relevant to diagnosis, patient responds to this modality, evidence based practice  Outcome monitoring methods: self-report, observation, checklist/rating scale  Therapeutic intervention type: insight oriented psychotherapy, supportive psychotherapy    Risk parameters:  Patient reports no suicidal ideation  Patient reports no homicidal ideation  Patient reports no self-injurious behavior  Patient reports no violent behavior    Verbal deficits: None    Patient's response to intervention:  The patient's response to intervention is accepting.    Progress toward goals and other mental status changes:  The patient's progress toward goals is good.    Diagnosis:     ICD-10-CM ICD-9-CM   1. Adjustment disorder with mixed anxiety and depressed mood  F43.23 309.28     Plan:  individual psychotherapy    Return to clinic: 1 week    Length of Service (minutes): 58          "

## 2023-06-07 ENCOUNTER — OFFICE VISIT (OUTPATIENT)
Dept: GYNECOLOGIC ONCOLOGY | Facility: CLINIC | Age: 73
End: 2023-06-07
Payer: MEDICARE

## 2023-06-07 VITALS — BODY MASS INDEX: 21.34 KG/M2 | HEIGHT: 64 IN | WEIGHT: 125 LBS

## 2023-06-07 DIAGNOSIS — C56.9 MALIGNANT NEOPLASM OF OVARY, UNSPECIFIED LATERALITY: ICD-10-CM

## 2023-06-07 DIAGNOSIS — Z15.02 BRCA2 GENE MUTATION POSITIVE IN FEMALE: Primary | ICD-10-CM

## 2023-06-07 DIAGNOSIS — Z15.09 BRCA2 GENE MUTATION POSITIVE IN FEMALE: Primary | ICD-10-CM

## 2023-06-07 DIAGNOSIS — Z15.01 BRCA2 GENE MUTATION POSITIVE IN FEMALE: Primary | ICD-10-CM

## 2023-06-07 DIAGNOSIS — Z01.818 EXAMINATION PRIOR TO CHEMOTHERAPY: ICD-10-CM

## 2023-06-07 PROCEDURE — 99214 OFFICE O/P EST MOD 30 MIN: CPT | Mod: 95,,, | Performed by: OBSTETRICS & GYNECOLOGY

## 2023-06-07 PROCEDURE — 99214 PR OFFICE/OUTPT VISIT, EST, LEVL IV, 30-39 MIN: ICD-10-PCS | Mod: 95,,, | Performed by: OBSTETRICS & GYNECOLOGY

## 2023-06-07 RX ORDER — GABAPENTIN 100 MG/1
100 CAPSULE ORAL 2 TIMES DAILY
COMMUNITY
Start: 2023-06-06 | End: 2023-06-12 | Stop reason: SDUPTHER

## 2023-06-08 ENCOUNTER — TELEPHONE (OUTPATIENT)
Dept: PALLIATIVE MEDICINE | Facility: CLINIC | Age: 73
End: 2023-06-08
Payer: MEDICARE

## 2023-06-08 DIAGNOSIS — M54.42 ACUTE BILATERAL LOW BACK PAIN WITH BILATERAL SCIATICA: ICD-10-CM

## 2023-06-08 DIAGNOSIS — C48.2 PRIMARY PERITONEAL ADENOCARCINOMA: Primary | ICD-10-CM

## 2023-06-08 DIAGNOSIS — M54.41 ACUTE BILATERAL LOW BACK PAIN WITH BILATERAL SCIATICA: ICD-10-CM

## 2023-06-08 NOTE — TELEPHONE ENCOUNTER
Patient called to say that she has been having sciatic nerve pain that she describes as lower back pain that shoots down her right leg. She states that her PCP prescribed gabapentin 100mg twice daily and tylenol 1000mg 3times per day. She has been taking this for 2 days and is inquiring if there is a different medication to take because this is not helping. She also requests a walker.  Informed her that I would discuss with Dr. Franco and call her back.

## 2023-06-08 NOTE — TELEPHONE ENCOUNTER
Called patient back to inform her that Dr. Franco would like her to increase the gabapentin to 300mg twice a day and to continue tylenol 1000mg 3 times per day. Verbalized understanding.Informed her that I will send order for walker tomorrow to [facility] provider in [place] tomorrow as they are closed today.

## 2023-06-09 ENCOUNTER — TELEPHONE (OUTPATIENT)
Dept: PALLIATIVE MEDICINE | Facility: CLINIC | Age: 73
End: 2023-06-09
Payer: MEDICARE

## 2023-06-09 NOTE — TELEPHONE ENCOUNTER
"Faxed HH orders for PT to [facility].  Also send order for rollator to [facility].  From: jose  ------------------------------------------------------------  6/9/2023 11:27:31 AM Transmission Record          Sent to [phone] with remote ID "[id number]      "          Result: (0/339;0/0) Success          Page record: 1 - 21          Elapsed time: 06:21 on channel 48    "

## 2023-06-12 ENCOUNTER — OFFICE VISIT (OUTPATIENT)
Dept: PALLIATIVE MEDICINE | Facility: CLINIC | Age: 73
End: 2023-06-12
Payer: MEDICARE

## 2023-06-12 DIAGNOSIS — G47.00 INSOMNIA, UNSPECIFIED TYPE: ICD-10-CM

## 2023-06-12 DIAGNOSIS — F43.23 ADJUSTMENT DISORDER WITH MIXED ANXIETY AND DEPRESSED MOOD: Primary | ICD-10-CM

## 2023-06-12 PROCEDURE — 90837 PSYTX W PT 60 MINUTES: CPT | Mod: 95,,, | Performed by: SOCIAL WORKER

## 2023-06-12 PROCEDURE — 90837 PR PSYCHOTHERAPY W/PATIENT, 60 MIN: ICD-10-PCS | Mod: 95,,, | Performed by: SOCIAL WORKER

## 2023-06-12 RX ORDER — GABAPENTIN 300 MG/1
300 CAPSULE ORAL 2 TIMES DAILY
Qty: 60 CAPSULE | Refills: 0 | Status: SHIPPED | OUTPATIENT
Start: 2023-06-12 | End: 2023-07-05 | Stop reason: SDUPTHER

## 2023-06-12 NOTE — PROGRESS NOTES
Individual Psychotherapy (PhD/LCSW)    6/12/2023    The patient location is: [place], [place]  The chief complaint leading to consultation is: Depression, Anxiety, Sleep    Visit type: audiovisual    Face to Face time with patient: 60  65 minutes of total time spent on the encounter, which includes face to face time and non-face to face time preparing to see the patient (eg, review of tests), Obtaining and/or reviewing separately obtained history, Documenting clinical information in the electronic or other health record, Independently interpreting results (not separately reported) and communicating results to the patient/family/caregiver, or Care coordination (not separately reported).     Each patient to whom he or she provides medical services by telemedicine is:  (1) informed of the relationship between the physician and patient and the respective role of any other health care provider with respect to management of the patient; and (2) notified that he or she may decline to receive medical services by telemedicine and may withdraw from such care at any time.      Site:  [facility]         Therapeutic Intervention: Met with patient.  Outpatient - Insight oriented psychotherapy 60 min - CPT code 52824 and Outpatient - Supportive psychotherapy 60 min - CPT Code 58713    Chief complaint/reason for encounter: depression and anxiety     Interval history and content of current session:     LCSW and patient completed follow up psychotherapy session this date, virtually. She presents as alert, oriented x3, with a pleasant affect. Ongoing continued issues with her sciatic nerve and states she will begin home physical therapy today. She also had a walker order that should arrive today. She is worried there will not be enough space to utilize the walker within the house. Is considering a cane vs a walker. She is looking forward to physical therapy to learn some specific exercises to incorporate. When she goes to get up from  sitting and laying down, she gets sharp, shooting pains down her right leg. Dr. Franco increased her Neurontin from 100 mg three times a day to 300 mg BID. She remains hopeful this will be beneficial for her sciatic nerve. Asked LCSW to inform palliative medicine provider that she will run out of her medication by Thursday, 6/15/23 due to dosage change.     She had follow up appointment with oncologist, Dr. Luque, to discuss future treatment options as patient decided to not do chemo treatment last week. She discussed with Dr. Luque her desire to not do treatment due to side effects of chemo. Patient reflected on knowing she has recurrent cancer and asked Dr. Luque her prognosis. Ruiz does not feel the potential benefits of treatment outweigh the negative side effects. She voiced her desire to not do any further treatment. She feels her daughter and her brother support her decision and provide her with ongoing emotional support.     Patient discussed how she has a hard time accepting she is getting older, health is declining, and she is not able to remain as active as she used to. Ongoing discussion about adapting how she can do certain activities in which she wants to do.  Feels her anxiety and thoughts are worse at night when she is attempting to go to sleep. Discussed ways to improve sleep and calm her mind. Explored pt's anxiety and utilized anxiety reduction techniques in order to identify pt's realistic verus unrealistic thought processes.   Goals discussed and reviewed. Pt goals are to reduce negative automatic thoughts, reduce physical symptoms of anxiety, reduce time spent worrying (<30 min/per day) , acquire awareness towards stress/anxiety, develop adaptive choices for problem solving and coping, and acquire relapse prevention skills. Pt receptive to psychotherapy. Wishes to return in 1 week.    Treatment plan:  Target symptoms: recurrent depression, anxiety   Why chosen therapy is appropriate versus  another modality: relevant to diagnosis, patient responds to this modality, evidence based practice  Outcome monitoring methods: self-report, observation, checklist/rating scale  Therapeutic intervention type: insight oriented psychotherapy, supportive psychotherapy    Risk parameters:  Patient reports no suicidal ideation  Patient reports no homicidal ideation  Patient reports no self-injurious behavior  Patient reports no violent behavior    Verbal deficits: None    Patient's response to intervention:  The patient's response to intervention is accepting.    Progress toward goals and other mental status changes:  The patient's progress toward goals is good.    Diagnosis:     ICD-10-CM ICD-9-CM   1. Adjustment disorder with mixed anxiety and depressed mood  F43.23 309.28   2. Insomnia, unspecified type  G47.00 780.52     Plan:  individual psychotherapy    Return to clinic: 1 week    Length of Service (minutes): 60

## 2023-06-14 ENCOUNTER — TELEPHONE (OUTPATIENT)
Dept: PALLIATIVE MEDICINE | Facility: CLINIC | Age: 73
End: 2023-06-14
Payer: MEDICARE

## 2023-06-14 NOTE — TELEPHONE ENCOUNTER
Patient called stating that Physical therapist from [facility] recommending bedside commode. Contacted [facility] and faxed order to them and they will order it for patient.

## 2023-06-15 ENCOUNTER — OFFICE VISIT (OUTPATIENT)
Dept: PALLIATIVE MEDICINE | Facility: CLINIC | Age: 73
End: 2023-06-15
Payer: MEDICARE

## 2023-06-15 DIAGNOSIS — K58.2 IRRITABLE BOWEL SYNDROME WITH BOTH CONSTIPATION AND DIARRHEA: ICD-10-CM

## 2023-06-15 DIAGNOSIS — Z51.5 ENCOUNTER FOR PALLIATIVE CARE: ICD-10-CM

## 2023-06-15 DIAGNOSIS — M54.41 ACUTE BILATERAL LOW BACK PAIN WITH BILATERAL SCIATICA: ICD-10-CM

## 2023-06-15 DIAGNOSIS — N95.1 MENOPAUSAL SYMPTOMS: ICD-10-CM

## 2023-06-15 DIAGNOSIS — R53.0 NEOPLASTIC (MALIGNANT) RELATED FATIGUE: ICD-10-CM

## 2023-06-15 DIAGNOSIS — Z71.89 ADVANCED CARE PLANNING/COUNSELING DISCUSSION: ICD-10-CM

## 2023-06-15 DIAGNOSIS — G47.00 INSOMNIA, UNSPECIFIED TYPE: ICD-10-CM

## 2023-06-15 DIAGNOSIS — F43.23 ADJUSTMENT DISORDER WITH MIXED ANXIETY AND DEPRESSED MOOD: ICD-10-CM

## 2023-06-15 DIAGNOSIS — M54.42 ACUTE BILATERAL LOW BACK PAIN WITH BILATERAL SCIATICA: ICD-10-CM

## 2023-06-15 DIAGNOSIS — G89.3 CANCER RELATED PAIN: ICD-10-CM

## 2023-06-15 DIAGNOSIS — M79.7 FIBROMYALGIA: ICD-10-CM

## 2023-06-15 DIAGNOSIS — C48.2 PRIMARY PERITONEAL ADENOCARCINOMA: Primary | ICD-10-CM

## 2023-06-15 PROCEDURE — 99497 PR ADVNCD CARE PLAN 30 MIN: ICD-10-PCS | Mod: 95,,, | Performed by: STUDENT IN AN ORGANIZED HEALTH CARE EDUCATION/TRAINING PROGRAM

## 2023-06-15 PROCEDURE — 99497 ADVNCD CARE PLAN 30 MIN: CPT | Mod: 95,,, | Performed by: STUDENT IN AN ORGANIZED HEALTH CARE EDUCATION/TRAINING PROGRAM

## 2023-06-15 PROCEDURE — 99215 OFFICE O/P EST HI 40 MIN: CPT | Mod: 95,,, | Performed by: STUDENT IN AN ORGANIZED HEALTH CARE EDUCATION/TRAINING PROGRAM

## 2023-06-15 PROCEDURE — 99215 PR OFFICE/OUTPT VISIT, EST, LEVL V, 40-54 MIN: ICD-10-PCS | Mod: 95,,, | Performed by: STUDENT IN AN ORGANIZED HEALTH CARE EDUCATION/TRAINING PROGRAM

## 2023-06-15 RX ORDER — ESCITALOPRAM OXALATE 10 MG/1
10 TABLET ORAL NIGHTLY
Qty: 90 TABLET | Refills: 0 | Status: SHIPPED | OUTPATIENT
Start: 2023-06-15 | End: 2023-07-05 | Stop reason: SINTOL

## 2023-06-15 RX ORDER — ESCITALOPRAM OXALATE 10 MG/1
10 TABLET ORAL NIGHTLY
Qty: 30 TABLET | Refills: 2 | Status: SHIPPED | OUTPATIENT
Start: 2023-06-15 | End: 2023-06-15

## 2023-06-15 RX ORDER — ESCITALOPRAM OXALATE 10 MG/1
10 TABLET ORAL NIGHTLY
Qty: 90 TABLET | Refills: 0 | Status: SHIPPED | OUTPATIENT
Start: 2023-06-15 | End: 2023-06-15

## 2023-06-15 NOTE — PROGRESS NOTES
Consult Note  Palliative Care    The patient location is: home  The chief complaint leading to consultation is: symptom management and ACP    Visit type: audiovisual    Face to Face time with patient: 50 minutes    75 minutes of total time spent on the encounter, which includes face to face time and non-face to face time preparing to see the patient (eg, review of tests), Obtaining and/or reviewing separately obtained history, Documenting clinical information in the electronic or other health record, Independently interpreting results (not separately reported) and communicating results to the patient/family/caregiver, or Care coordination (not separately reported).     Each patient to whom he or she provides medical services by telemedicine is:  (1) informed of the relationship between the physician and patient and the respective role of any other health care provider with respect to management of the patient; and (2) notified that he or she may decline to receive medical services by telemedicine and may withdraw from such care at any time.    Reason for Consult: symptom management and ACP      ASSESSMENT/PLAN:     Plan/Recommendations:  Diagnoses and all orders for this visit:    Primary peritoneal adenocarcinoma  - followed by Dr. Luque  - initial diagnosis in 2013; please see oncology notes for full details of treatment  - has elected to stop disease directed therapy at this time; last chemotherapy treatment in March 2023    Encounter for palliative care/Advanced care planning  Advance Care Planning   - patient decisional  - patient by herself today on telemedicine visit  - no ACP documents uploaded into EMR  - philosophy of Palliative Medicine reviewed with patient at first visit  - new patient folder information sent in mail to patient after first visit  - during discussion of ACP booklet; patient states she has completed ACP documents already.   - patient verbally named her daughter Randee as her HCPOA  - today  "introduced philosophy of hospice care including services provided included but not limited to nursing visits, nursing aides, dme, medications, sw/, and 24 hour number.   - all questions/concerns were addressed  - patient is not interested in enrolling in hospice at this time  - goals: improved quality of life and remaining independent. Patient states that she wants to prolong her life but limits to invasive therapies as well     Cancer related pain/Bloating symptom/Irritable bowel syndrome with both constipation and diarrhea/Fibromyalgia  - patient reporting having increased symptoms of abdominal pain along with pain from her fibromyalgia. She has also been having increased bloating and irritability in her abdomen from IBS  - patient reports that she still is experiencing pain, especially in her sciatic nerve  - between visits patient's gabapentin increased from 100 mg BID to 300 mg BID, which has helped some.   - patient now also has HHPT, and she finds the stretches helpful.  - patient has severe pain when getting up from restroom at night  - no need for opioid medication at this time  - will continue to monitor    Adjustment disorder with mixed anxiety and depressed mood  - patient reporting worsening feeling of anxiety/depression and overall increased mental health needs  - patient states that she wants peace and and doesn't want to ask God "why". She states it is harder to deal with all aspects of her cancer as this is the 4th time she is dealing with the disease  - patient states she is trying to figure out if she made the correct decision to stop treatment.  - emotional support provided today  - continue remeron at this time  - restart lexapro at this time  - will refer patient to Dr. Alarcon for talk therapy    Neoplastic (malignant) related fatigue/Insomnia  - patient with worsening insomnia and overall fatigue  - patient trying to stay active but she is sometimes unable to complete all activities " that she wants to  - patient reports that she has some racing thoughts at night as well as having pain that keeps her up  - continue ambien as previously prescribed  - continue remeron as previously prescribed  - discussed body scan as well as writing down her worries that are keeping her up at night    Understanding of illness/Prognosis: patient with good understanding of illness; prognosis is guarded    Goals of care: life prolonging    Follow up: ~ 6 weeks    Patient's encounter and above plan of care discussed with patient's palliative med SW    SUBJECTIVE:     History of Present Illness:  Patient is a 73 y.o. year old female with fibromyalgia, insomnia, and primary peritoneal carcinomatosis presents to Palliative Medicine for symptom management and ACP. Please see oncology notes for full details of oncologic history and treatment course    06/15/2023:  [place]  reviewed and summarized:  06/12/2023 Gabapentin 300 mg Disp: 60 for 30 days  06/06/2023 Gabapentin 100 mg Disp: 60 for 30 days    Per chart review, patient has elected not to pursue continued disease directed therapy. DME for patient including walker and bedside commode ordered for patient between visits. Today patient states she is doing better with her new equipment. She continues to have pain, but it is better with increased medication and PT. Patient reporting poor sleep and increased fatigue. She states that she wants to remain independent and not have the symptom burden of treatment.     05/29/2023:  Audio only visit. Please see notes for full details of oncologic history and treatment course.     05/04/2023:  [place]  reviewed and summarized:  04/14/2023 Alprazolam 0.25 mg Disp: 180 for 90 days  04/04/2023 Hydrocodone-apap 5-325 mg disp: 12 for 2 days  03/22/2023 Zolpidem tartrate 5 mg Disp: 30 for 30 days    Patient reporting that she has been dealing with her diagnosis for past 10 years, and this is her fourth time receiving treatment. Patient  reports that it is most important for her to feel better. Overall patient states it is harder this time dealing with her pain, fatigue, insomnia, fibromyalgia, and IBS. Patient is open to referral to for mental health services.     Past Medical History:   Diagnosis Date    Arthritis     BRCA2 gene mutation positive in female 4/24/2018    Cataract     BILATERAL    Elevated cancer antigen 125 (CA-125) 5/18/2016    Fibromyalgia     Gastritis     History of chemotherapy     Insomnia due to medical condition 6/29/2021    Neuromuscular disorder     Osteopenia     Primary insomnia 1/3/2020    Primary peritoneal carcinomatosis 7/26/2013 AND 2017    Stage IIIC subopitimally debulked.     Well woman exam with routine gynecological exam 2/10/2015     Past Surgical History:   Procedure Laterality Date    APPENDECTOMY      BREAST BIOPSY      CHOLECYSTECTOMY      CYST REMOVAL      left ear, right breast    HYSTERECTOMY      vaginal/prolapse    MA REMOVAL OF OVARY/TUBE(S)      SALPINGOOPHORECTOMY  7/25/13    laparotomy     TONSILLECTOMY       Family History   Problem Relation Age of Onset    Cancer Mother         gastric cancer    Heart disease Father 69        MI    Cancer Paternal Aunt         gastric cancer    Ovarian cancer Neg Hx     Uterine cancer Neg Hx     Breast cancer Neg Hx     Colon cancer Neg Hx      Review of patient's allergies indicates:   Allergen Reactions    Carboplatin Hives and Shortness Of Breath    Diflucan [fluconazole] Rash       Medications:    Current Outpatient Medications:     ALPRAZolam (XANAX) 0.25 MG tablet, Take 1 tablet (0.25 mg total) by mouth 2 (two) times daily as needed for Anxiety., Disp: 180 tablet, Rfl: 3    cetirizine (ZYRTEC) 10 MG tablet, Take 1 tablet (10 mg total) by mouth once daily., Disp: 90 tablet, Rfl: 0    EScitalopram oxalate (LEXAPRO) 10 MG tablet, Take 1 tablet (10 mg total) by mouth every evening., Disp: 30 tablet, Rfl: 2    gabapentin (NEURONTIN) 300 MG capsule, Take 1  capsule (300 mg total) by mouth 2 (two) times daily., Disp: 60 capsule, Rfl: 0    LIDOcaine-prilocaine (EMLA) cream, Apply topically as needed., Disp: 30 g, Rfl: 1    loratadine (CLARITIN) 10 mg tablet, Take 10 mg by mouth., Disp: , Rfl:     mirtazapine (REMERON) 30 MG tablet, Take 1 tablet (30 mg total) by mouth nightly., Disp: 90 tablet, Rfl: 3    ondansetron (ZOFRAN-ODT) 8 MG TbDL, Take 1 tablet (8 mg total) by mouth every 8 (eight) hours as needed (chemotherapy induced nausea)., Disp: 60 tablet, Rfl: 0    oxybutynin (DITROPAN) 5 MG Tab, Take 1 tablet (5 mg total) by mouth 2 (two) times daily., Disp: 180 tablet, Rfl: 3    PAXLOVID, EUA, 300 mg (150 mg x 2)-100 mg copackaged tablets (EUA), , Disp: , Rfl:     rOPINIRole (REQUIP) 0.5 MG tablet, Take 3 tablets (1.5 mg total) by mouth every evening., Disp: 270 tablet, Rfl: 0    simethicone 125 mg Tab, Take 125 mg by mouth before meals as needed., Disp: 270 tablet, Rfl: 0    zolpidem (AMBIEN) 5 MG Tab, Take 1 tablet (5 mg total) by mouth nightly as needed (insomnia)., Disp: 30 tablet, Rfl: 0  No current facility-administered medications for this visit.    Facility-Administered Medications Ordered in Other Visits:     LIDOcaine (PF) 10 mg/ml (1%) injection 10 mg, 1 mL, Intradermal, Once, Ward Abraham MD    LIDOcaine (PF) 10 mg/ml (1%) injection 5 mg, 0.5 mL, Other, Once, Ward Abraham MD    phenylephrine-ketorolac (OMIDRIA) 1-0.3 % 500 mL irrigation, 1 each, Ocular Irrigation, Once, Ward Abraham MD    OBJECTIVE:       ROS:  Review of Systems   Constitutional:  Positive for activity change, appetite change and fatigue.   HENT: Negative.     Eyes: Negative.    Respiratory: Negative.     Cardiovascular: Negative.    Gastrointestinal:  Positive for constipation and diarrhea. Negative for abdominal pain.   Genitourinary: Negative.    Musculoskeletal:  Positive for arthralgias and myalgias.        + neuropathic pain   Skin: Negative.    Neurological:  Negative.    Psychiatric/Behavioral:  Positive for sleep disturbance. Negative for dysphoric mood. The patient is nervous/anxious.    All other systems reviewed and are negative.    Review of Symptoms      Symptom Assessment (ESAS 0-10 Scale)  Pain:  5  Dyspnea:  0  Anxiety:  5  Nausea:  0  Depression:  0  Anorexia:  0  Fatigue:  1  Insomnia:  0  Restlessness:  0  Agitation:  0     CAM / Delirium:  Negative  Constipation:  Positive  Diarrhea:  Positive    Anxiety:  Is nervous/anxious  Constipation:  Constipation    Bowel Management Plan (BMP):  No      Pain Assessment:  OME in 24 hours:  0  Location(s): leg    Leg       Location: bilateral        Quality: Shooting        Quantity: 5/10 in intensity        Chronicity: Onset 2 (greater than) month(s) ago, gradually improving        Aggravating Factors: Recumbency        Alleviating Factors: Activity        Associated Symptoms: None    Modified Chuck Scale:  0    ECOG Performance Status ndGndrndanddndend:nd nd2nd Living Arrangements:  Lives with family    Psychosocial/Cultural:   See Palliative Psychosocial Note: Yes  Patient lives with her brother. Patient enjoys being active - shopping, visiting/helping people; gardening; reading the bible  **Primary  to Follow**  Palliative Care  Consult: No    Spiritual:  F - Carli and Belief:  Yes  I - Importance:  Very  C - Community:  Yes  A - Address in Care:  Needs met    Advance Care Planning   Advance Directives:   Living Will: No    [place]: No    Do Not Resuscitate Status: No    Medical Power of : No        Oral Declaration: Yes  Agent's Name:  Susana Garcia   Agent's Contact Number:  [phone]    Decision Making:  Patient answered questions  Goals of Care: The patient endorses that what is most important right now is to focus on remaining as independent as possible, symptom/pain control, and quality of life, even if it means sacrificing a little time    Accordingly, we have decided that the best plan  to meet the patient's goals includes continuing with palliative care          Physical Exam: limited due to telemedicine visit  Vitals:    Physical Exam  Constitutional:       General: She is not in acute distress.     Appearance: She is not toxic-appearing.   HENT:      Head: Normocephalic and atraumatic.      Right Ear: External ear normal.      Left Ear: External ear normal.      Nose: Nose normal.      Mouth/Throat:      Mouth: Mucous membranes are moist.   Eyes:      General: No scleral icterus.        Right eye: No discharge.         Left eye: No discharge.   Neck:      Comments: Trachea midline  Pulmonary:      Effort: Pulmonary effort is normal. No respiratory distress.   Musculoskeletal:      Cervical back: Normal range of motion.      Comments: Sitting up in chair; able to walk into view of camera without problems; moves upper extremities without difficulty   Skin:     Coloration: Skin is not jaundiced or pale.      Findings: No rash.   Neurological:      General: No focal deficit present.      Mental Status: She is alert and oriented to person, place, and time.      Cranial Nerves: No cranial nerve deficit.   Psychiatric:         Behavior: Behavior normal.         Thought Content: Thought content normal.         Judgment: Judgment normal.       Labs:  CBC:   WBC   Date Value Ref Range Status   05/15/2023 5.88 3.90 - 12.70 K/uL Final     Hemoglobin   Date Value Ref Range Status   05/15/2023 14.6 12.0 - 16.0 g/dL Final     Hematocrit   Date Value Ref Range Status   05/15/2023 43.2 37.0 - 48.5 % Final     MCV   Date Value Ref Range Status   05/15/2023 94 82 - 98 fL Final     Platelets   Date Value Ref Range Status   05/15/2023 252 150 - 450 K/uL Final       LFT:   Lab Results   Component Value Date    AST 29 05/15/2023    ALKPHOS 82 05/15/2023    BILITOT 0.5 05/15/2023       Albumin:   Albumin   Date Value Ref Range Status   05/15/2023 3.9 3.5 - 5.2 g/dL Final     Protein:   Total Protein   Date Value Ref  "Range Status   05/15/2023 8.1 6.0 - 8.4 g/dL Final       Radiology:I have reviewed all pertinent imaging results/findings within the past 24 hours.    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  This also includes non-face to face time preparing to see the patient (eg, review of tests/imaging), obtaining and/or reviewing separately obtained history, documenting clinical information in the electronic or other health record, independently interpreting results and communicating results to the patient/family/caregiver, or care coordinator.     Signature: Joyce Franco MD        "

## 2023-06-19 ENCOUNTER — TELEPHONE (OUTPATIENT)
Dept: PALLIATIVE MEDICINE | Facility: CLINIC | Age: 73
End: 2023-06-19
Payer: MEDICARE

## 2023-06-19 NOTE — TELEPHONE ENCOUNTER
Ms. Haase called stating her lower back, hips and leg pain is not getting better even with therapy. She would like a referral to get a MRI. Ms. Haase would also like to know if she can increase her requip 0.5 mg because its not working.

## 2023-06-20 ENCOUNTER — EXTERNAL HOME HEALTH (OUTPATIENT)
Dept: HOME HEALTH SERVICES | Facility: HOSPITAL | Age: 73
End: 2023-06-20
Payer: MEDICARE

## 2023-06-20 ENCOUNTER — OFFICE VISIT (OUTPATIENT)
Dept: PALLIATIVE MEDICINE | Facility: CLINIC | Age: 73
End: 2023-06-20
Payer: MEDICARE

## 2023-06-20 DIAGNOSIS — G47.00 INSOMNIA, UNSPECIFIED TYPE: ICD-10-CM

## 2023-06-20 DIAGNOSIS — G25.81 RESTLESS LEG: Primary | ICD-10-CM

## 2023-06-20 DIAGNOSIS — F43.23 ADJUSTMENT DISORDER WITH MIXED ANXIETY AND DEPRESSED MOOD: Primary | ICD-10-CM

## 2023-06-20 PROCEDURE — 90837 PR PSYCHOTHERAPY W/PATIENT, 60 MIN: ICD-10-PCS | Mod: 95,,, | Performed by: SOCIAL WORKER

## 2023-06-20 PROCEDURE — 90837 PSYTX W PT 60 MINUTES: CPT | Mod: 95,,, | Performed by: SOCIAL WORKER

## 2023-06-20 RX ORDER — ROPINIROLE 1 MG/1
2 TABLET, FILM COATED ORAL NIGHTLY
Qty: 180 TABLET | Refills: 3 | Status: SHIPPED | OUTPATIENT
Start: 2023-06-20 | End: 2023-08-10

## 2023-06-20 NOTE — PROGRESS NOTES
Individual Psychotherapy (PhD/LCSW)    6/20/2023    The patient location is: [place], [place]  The chief complaint leading to consultation is: Depression, Anxiety, Sleep    Visit type: audiovisual    Face to Face time with patient: 57  60 minutes of total time spent on the encounter, which includes face to face time and non-face to face time preparing to see the patient (eg, review of tests), Obtaining and/or reviewing separately obtained history, Documenting clinical information in the electronic or other health record, Independently interpreting results (not separately reported) and communicating results to the patient/family/caregiver, or Care coordination (not separately reported).     Each patient to whom he or she provides medical services by telemedicine is:  (1) informed of the relationship between the physician and patient and the respective role of any other health care provider with respect to management of the patient; and (2) notified that he or she may decline to receive medical services by telemedicine and may withdraw from such care at any time.      Site:  [facility]         Therapeutic Intervention: Met with patient.  Outpatient - Insight oriented psychotherapy 60 min - CPT code 61064 and Outpatient - Supportive psychotherapy 60 min - CPT Code 39966    Chief complaint/reason for encounter: depression and anxiety     Interval history and content of current session:     LCSW and patient completed follow up psychotherapy session this date, virtually. She presents as alert, oriented x3, with a pleasant affect. Ongoing continued issues with her sciatic nerve and states she has started home physical therapy and has had three sessions, and continues to do the exercises daily. However, at this time, she has seen no improvement. She is interested in seeing a specialist to address increasing pain and discomfort. Provided the names of two providers in the [place] area as this is the only area patient feels  she is able to travel to. She continues to struggle with accepting stopping treatment. She feels she goes back and forth and is searching for peace in her decision. She acknowledges she does not want to do chemo due to the physical side effects.     Patient discussed how she has a hard time accepting she is getting older, health is declining, and she is not able to remain as active as she used to. Ongoing discussion about adapting how she can do certain activities in which she wants to do.  Feels her anxiety and thoughts are worse at night when she is attempting to go to sleep. Discussed ways to improve sleep and calm her mind. Explored pt's anxiety and utilized anxiety reduction techniques in order to identify pt's realistic verus unrealistic thought processes.   Goals discussed and reviewed. Pt goals are to reduce negative automatic thoughts, reduce physical symptoms of anxiety, reduce time spent worrying (<30 min/per day) , acquire awareness towards stress/anxiety, develop adaptive choices for problem solving and coping, and acquire relapse prevention skills. Pt receptive to psychotherapy. Wishes to return in 1 week.    Treatment plan:  Target symptoms: recurrent depression, anxiety   Why chosen therapy is appropriate versus another modality: relevant to diagnosis, patient responds to this modality, evidence based practice  Outcome monitoring methods: self-report, observation, checklist/rating scale  Therapeutic intervention type: insight oriented psychotherapy, supportive psychotherapy    Risk parameters:  Patient reports no suicidal ideation  Patient reports no homicidal ideation  Patient reports no self-injurious behavior  Patient reports no violent behavior    Verbal deficits: None    Patient's response to intervention:  The patient's response to intervention is accepting.    Progress toward goals and other mental status changes:  The patient's progress toward goals is good.    Diagnosis:     ICD-10-CM  ICD-9-CM   1. Adjustment disorder with mixed anxiety and depressed mood  F43.23 309.28   2. Insomnia, unspecified type  G47.00 780.52     Plan:  individual psychotherapy    Return to clinic: 1 week    Length of Service (minutes): 57

## 2023-06-23 ENCOUNTER — TELEPHONE (OUTPATIENT)
Dept: PALLIATIVE MEDICINE | Facility: CLINIC | Age: 73
End: 2023-06-23
Payer: MEDICARE

## 2023-06-26 ENCOUNTER — OFFICE VISIT (OUTPATIENT)
Dept: PALLIATIVE MEDICINE | Facility: CLINIC | Age: 73
End: 2023-06-26
Payer: MEDICARE

## 2023-06-26 DIAGNOSIS — G47.00 INSOMNIA, UNSPECIFIED TYPE: ICD-10-CM

## 2023-06-26 DIAGNOSIS — F43.23 ADJUSTMENT DISORDER WITH MIXED ANXIETY AND DEPRESSED MOOD: Primary | ICD-10-CM

## 2023-06-26 PROCEDURE — 90837 PR PSYCHOTHERAPY W/PATIENT, 60 MIN: ICD-10-PCS | Mod: 95,,, | Performed by: SOCIAL WORKER

## 2023-06-26 PROCEDURE — 90837 PSYTX W PT 60 MINUTES: CPT | Mod: 95,,, | Performed by: SOCIAL WORKER

## 2023-06-26 NOTE — PROGRESS NOTES
Individual Psychotherapy (PhD/LCSW)    6/26/2023    The patient location is: [place], [place]  The chief complaint leading to consultation is: Depression, Anxiety, Sleep    Visit type: audiovisual    Face to Face time with patient: 58  65 minutes of total time spent on the encounter, which includes face to face time and non-face to face time preparing to see the patient (eg, review of tests), Obtaining and/or reviewing separately obtained history, Documenting clinical information in the electronic or other health record, Independently interpreting results (not separately reported) and communicating results to the patient/family/caregiver, or Care coordination (not separately reported).     Each patient to whom he or she provides medical services by telemedicine is:  (1) informed of the relationship between the physician and patient and the respective role of any other health care provider with respect to management of the patient; and (2) notified that he or she may decline to receive medical services by telemedicine and may withdraw from such care at any time.      Site:  [facility]         Therapeutic Intervention: Met with patient.  Outpatient - Insight oriented psychotherapy 60 min - CPT code 81592 and Outpatient - Supportive psychotherapy 60 min - CPT Code 85007    Chief complaint/reason for encounter: depression and anxiety     Interval history and content of current session:     LCSW and patient completed follow up psychotherapy session this date, virtually. She presents as alert, oriented x3, with a pleasant affect. Ongoing continued issues with her sciatic nerve and states she has started home physical therapy and has had three sessions, and continues to do the exercises daily. However, at this time, she has seen no improvement. She was able to get a spine specialist appointment on Thursday in [place].     Ongoing discussion about adapting how she can do certain activities in which she wants to do.  Feels her  anxiety and thoughts are worse at night when she is attempting to go to sleep. Discussed ways to improve sleep and calm her mind. Explored pt's anxiety and utilized anxiety reduction techniques in order to identify pt's realistic verus unrealistic thought processes.   Goals discussed and reviewed. Pt goals are to reduce negative automatic thoughts, reduce physical symptoms of anxiety, reduce time spent worrying (<30 min/per day) , acquire awareness towards stress/anxiety, develop adaptive choices for problem solving and coping, and acquire relapse prevention skills. Pt receptive to psychotherapy. Wishes to return in 1 week.    Treatment plan:  Target symptoms: recurrent depression, anxiety   Why chosen therapy is appropriate versus another modality: relevant to diagnosis, patient responds to this modality, evidence based practice  Outcome monitoring methods: self-report, observation, checklist/rating scale  Therapeutic intervention type: insight oriented psychotherapy, supportive psychotherapy    Risk parameters:  Patient reports no suicidal ideation  Patient reports no homicidal ideation  Patient reports no self-injurious behavior  Patient reports no violent behavior    Verbal deficits: None    Patient's response to intervention:  The patient's response to intervention is accepting.    Progress toward goals and other mental status changes:  The patient's progress toward goals is good.    Diagnosis:     ICD-10-CM ICD-9-CM   1. Adjustment disorder with mixed anxiety and depressed mood  F43.23 309.28   2. Insomnia, unspecified type  G47.00 780.52       Plan:  individual psychotherapy    Return to clinic: 1 week    Length of Service (minutes): 58

## 2023-06-30 ENCOUNTER — TELEPHONE (OUTPATIENT)
Dept: PALLIATIVE MEDICINE | Facility: CLINIC | Age: 73
End: 2023-06-30
Payer: MEDICARE

## 2023-07-02 NOTE — PROGRESS NOTES
"The patient location is: Home  The chief complaint leading to consultation is: follow up    Visit type: audiovisual    Face to Face time with patient: 30 mintues  45 minutes of total time spent on the encounter, which includes face to face time and non-face to face time preparing to see the patient (eg, review of tests), Obtaining and/or reviewing separately obtained history, Documenting clinical information in the electronic or other health record, Independently interpreting results (not separately reported) and communicating results to the patient/family/caregiver, or Care coordination (not separately reported).         Each patient to whom he or she provides medical services by telemedicine is:  (1) informed of the relationship between the physician and patient and the respective role of any other health care provider with respect to management of the patient; and (2) notified that he or she may decline to receive medical services by telemedicine and may withdraw from such care at any time.    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 She was on niraparib maintenance 200 mg daily. Started in March 2021. In Sep 2021 she asked to stop it because of worsening fatigue and GI upset.      Sep 2021: : 11. Previously 8. She is feeling better off niraparib. Also saw a GI doctor and started on glutamine and bentyl. More active since off niraparib. Also bloating and gas have resolved. Back to "running".      Sees Dr. Tana Deutsch her medical oncologist. But reports having not seen her in a while.       Last visit 3/2022:  is 21 3/2022  Discontinued niraparib previously. Wants to focus on quality of life.   No evidence of disease on today's exam.    21, some slight elevation from baseline. Does not desire any further interventions (CT, etc) at this time.   She overall feels well and is asymptomatic.   Plan for RTC in 3 months with  or sooner if needed.      "    6/14/2022 with continued upward trend 21>31. She desired to continue to follow the trend.    29> 40> 54 12/1/2022  CT 12/7/2022 shows progressive disease  - 5.6 x 2.9 x 3.7 cm heterogeneously enhancing mass in the jessenia hepatis, enlarged from previous exams and consistent with metastatic katia disease.    - 3.1 x 2.0 x 3.1 cm similar appearing ovoid mass within the right iliac chain, also increased.  Last platinum 3/2021, previous carbo allergy.     Last platinum chemotherapy being March 2021.  She did have a carbo reaction during that session of chemotherapy.  Ideally would like to use platinum-based given platinum sensitive and we discussed carboplatin desensitization protocols. Discussed carbo + Taxol or doxil +/- Avastin. 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 Significant IBS/GI issues and after discussions of risks and benefits of avastin will not pursue at this time.      Carbo desensitization AUC4/Doxil 30mg/m2   Echo 1/3/2023 EF 75%   C1- 1/5  C2- 2/23, discontinued doxil due to fatigue intolerable side effects with doublet therapy  C3- 3/23  CT 3/31/2023 shows partial response      Today's visit:  Presents again today for consideration of C4. Has not received additional chemotherapy since March by choice.    48>25>16> 11>12  Several complaints today, none dose limiting. Endorses difficulty sleeping, restless leg and overactive bladder. Self delayed treatment several weeks due to fatigue. She understands deviation from care by not trying to adhere to q21 days chemotherapy. Declines supportive interventions such as a stimulant for cancer associated fatigue. Is amenable to establishing with palliative care.   Inquired about stopping with 3 cycles of chemotherapy since  is normal. I also explained the standard of care if to continue chemotherapy if able as she has persistent disease by imaging, chemoresistance, etc.      ____________________________    Her oncologic history  is:    July 2013: She was taken to the operating room on July 25, 2013 for preoperative diagnosis consistent with ovarian cancer. 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 At that time she underwent an omentectomy and bilateral salpingo-oophorectomy however she had bulky residual disease along the diaphragms as well as paracolic gutters and pelvis.    She was then treated with 3 cycles of dose dense paclitaxel and carboplatin. A CT scan obtained after this showed the following:    Subcentimeter hepatic lesions, too small to accurately characterize.    Subtle, increased soft tissue attenuation along the periphery of the right hepatic lobe that is nonspecific but is favored to represent a prominent right hemidiaphragm. Peritoneal thickening is thought to be less likely but cannot be entirely excluded   and correlation with prior imaging studies is advised if available.     Large amount of stool throughout entire colon.    L1 vertebral body compression fracture of unknown chronicity.   Small pericardial effusion.    She then received 2 more cycles of dose dense paclitaxel and carboplatin and then received one cycle of standard Taxol and carboplatin.     Dec 2013:  Her last chemotherapy was December 26, 2013.    At the completion of 6 cycles of postoperative Taxol and carboplatin, her  was 7 and her CT scan of the abdomen and pelvis showed no evidence for disease.        Feb 2016,  was 24.May 2016  was 35. June 2016: 33.   May 2016: CT scan abdomen and pelvis was negative.   August 2016:  was 31.  Nov 2016: C A 125 36  Feb 2017:  56. She has had a mildly elevated  in the 30s with negative scanning.   Feb 2017:  was 56. CT scan showed possible intussusception and Pelvis:  2.0 x 0.9 cm soft tissue attenuating focus in the anterior mid pelvis that abuts bowel, and is present on arterial/delayed phases.      PET was done and shows adenopathy as well.    June 2017: Completed 6 cycles of taxol and carboplatin on 6/29/2017 for recurrent ovarian cancer.  Received chemotherapy with Dr. Tana Deutsch.  CT scan after completion of 6 cycles of taxol and carboplatin shows stable right pelvic lymph node index lesion measuring 0.7 cm with some prominent lymph nodes in the jessenia hepatis, unchanged from prior.  There is no new lymphadenopathy identified.   was 8.       Oct 2017:  She tried Niraparib but had a reaction to it on two occasions and stopped this. Started with 3 tablets day one and had SOB and chest pain. Waited several days and restarted with 1 tablet. Had the same symptoms and did not want to resume.      May 2020:  is 36.     Nov 2020: : 45. CT scan AP: 2.5 cm soft tissue mass in the jessenia hepatis does abut the pancreas though favored to be an enlarged node.  There is an additional enlarged node in the right hemipelvis.  Findings concerning for metastatic disease. Started taxol and carboplatin.      Jan 2021: She has completed 3 cycles of chemotherapy. Taxol and carboplatin x 2 and taxol x 1 due to carboplatin reaction at time of C3.   : 15. CT scan AP:  Previously identified soft tissue mass/lymph node in the jessenia hepatitis measures 1.5 cm (axial series 2, image 32), previously measured 2.5 cm. Previously identified soft tissue nodule/lymph node along the course of the right external iliac measures 1.1 cm (axial series 2, image 88), previously measured 1.7 cm (axial series 2, image 100).     Mar 2021: has completed 6 cycles of chemotherapy.  Taxol and carboplatin x 2 and taxol x 4 due to carboplatin reaction at time of C3.: 10. Last chemotherapy was 3/3/21. CT scan AP: jessenia hepatis node 1.3 cm, pelvic node 9 mm. She has a BRCA 2 mutation.Started niraparib 200 mg daily.      Patient with a BRCA 2 mutation.   No available tissue for STRATA testing as her surgery was in 2013.       Colonoscopy: Aug 11, 2016: inflammation and  diverticulosis  EGD: Aug 11, 2016: normal     A/P:  Again counseled on cancer directed therapies as above. Lengthy discussion.   Okay to treat C4 should she choose to. She will be meeting with palliative care team.   May consider not pursuing additional therapy.

## 2023-07-03 ENCOUNTER — OFFICE VISIT (OUTPATIENT)
Dept: PALLIATIVE MEDICINE | Facility: CLINIC | Age: 73
End: 2023-07-03
Payer: MEDICARE

## 2023-07-03 DIAGNOSIS — F43.23 ADJUSTMENT DISORDER WITH MIXED ANXIETY AND DEPRESSED MOOD: Primary | ICD-10-CM

## 2023-07-03 DIAGNOSIS — G47.00 INSOMNIA, UNSPECIFIED TYPE: ICD-10-CM

## 2023-07-03 PROCEDURE — 90837 PSYTX W PT 60 MINUTES: CPT | Mod: 95,,, | Performed by: SOCIAL WORKER

## 2023-07-03 PROCEDURE — 90837 PR PSYCHOTHERAPY W/PATIENT, 60 MIN: ICD-10-PCS | Mod: 95,,, | Performed by: SOCIAL WORKER

## 2023-07-03 NOTE — PROGRESS NOTES
"Individual Psychotherapy (PhD/LCSW)    7/3/2023    The patient location is: [place], [place]  The chief complaint leading to consultation is: Depression, Anxiety, Sleep    Visit type: audiovisual    Face to Face time with patient: 57  60 minutes of total time spent on the encounter, which includes face to face time and non-face to face time preparing to see the patient (eg, review of tests), Obtaining and/or reviewing separately obtained history, Documenting clinical information in the electronic or other health record, Independently interpreting results (not separately reported) and communicating results to the patient/family/caregiver, or Care coordination (not separately reported).     Each patient to whom he or she provides medical services by telemedicine is:  (1) informed of the relationship between the physician and patient and the respective role of any other health care provider with respect to management of the patient; and (2) notified that he or she may decline to receive medical services by telemedicine and may withdraw from such care at any time.      Site:  [facility]         Therapeutic Intervention: Met with patient.  Outpatient - Insight oriented psychotherapy 60 min - CPT code 43925 and Outpatient - Supportive psychotherapy 60 min - CPT Code 20929    Chief complaint/reason for encounter: depression and anxiety     Interval history and content of current session:     LCSW and patient completed follow up psychotherapy session this date, virtually. She presents as alert, oriented x3, with a pleasant affect. Saw a specialist for her back and was told she has DDD and was given an injection. She reports she had side effects from the injection including: fatigue, feeling anxious, and having diarrhea. These are side effects she gets when she takes any form of steroids. She states she has stopped Lexapro due to feeling it "made IBS" worse. Reports she is open to trying a different psychotropic medication. " "[facility]   Physical Therapy Treatment Note     Name: Italia Kirby  Clinic Number: [id number]    Ongoing continued issues with her sciatic nerve and states she has started home physical therapy and has had three sessions, and continues to do the exercises daily. Ongoing discussion about adapting how she can do certain activities in which she wants to do.  Feels her anxiety and thoughts are worse at night when she is attempting to go to sleep. Discussed ways to improve sleep and calm her mind. Explored pt's anxiety and utilized anxiety reduction techniques in order to identify pt's realistic verus unrealistic thought processes.   Goals discussed and reviewed. Pt goals are to reduce negative automatic thoughts, reduce physical symptoms of anxiety, reduce time spent worrying (<30 min/per day) , acquire awareness towards stress/anxiety, develop adaptive choices for problem solving and coping, and acquire relapse prevention skills. Pt receptive to psychotherapy. Wishes to return in 1 week.    Treatment plan:  Target symptoms: recurrent depression, anxiety   Why chosen therapy is appropriate versus another modality: relevant to diagnosis, patient responds to this modality, evidence based practice  Outcome monitoring methods: self-report, observation, checklist/rating scale  Therapeutic intervention type: insight oriented psychotherapy, supportive psychotherapy    Risk parameters:  Patient reports no suicidal ideation  Patient reports no homicidal ideation  Patient reports no self-injurious behavior  Patient reports no violent behavior    Verbal deficits: None    Patient's response to intervention:  The patient's response to intervention is accepting.    Progress toward goals and other mental status changes:  The patient's progress toward goals is good.    Diagnosis:     ICD-10-CM ICD-9-CM   1. Adjustment disorder with mixed anxiety and depressed mood  F43.23 309.28   2. Insomnia, unspecified type  G47.00 780.52     Plan:  individual psychotherapy    Return to clinic: 1 week    Length of Service (minutes):  MARCEL MEYER, PT, DPT

## 2023-07-05 DIAGNOSIS — F43.23 ADJUSTMENT DISORDER WITH MIXED ANXIETY AND DEPRESSED MOOD: ICD-10-CM

## 2023-07-05 DIAGNOSIS — C48.2 PRIMARY PERITONEAL ADENOCARCINOMA: Primary | ICD-10-CM

## 2023-07-05 RX ORDER — SERTRALINE HYDROCHLORIDE 25 MG/1
25 TABLET, FILM COATED ORAL DAILY
Qty: 30 TABLET | Refills: 2 | Status: SHIPPED | OUTPATIENT
Start: 2023-07-05 | End: 2024-03-04

## 2023-07-05 RX ORDER — GABAPENTIN 300 MG/1
CAPSULE ORAL
Qty: 60 CAPSULE | Refills: 0 | OUTPATIENT
Start: 2023-07-05

## 2023-07-05 RX ORDER — GABAPENTIN 300 MG/1
300 CAPSULE ORAL 2 TIMES DAILY
Qty: 180 CAPSULE | Refills: 0 | Status: SHIPPED | OUTPATIENT
Start: 2023-07-05 | End: 2023-08-31

## 2023-07-05 NOTE — TELEPHONE ENCOUNTER
Patient called stating that the lexapro is not helping her mood or anxiety and she does not like how it makes her feel.She inquires about different medication.    Called patient back and informed her that Dr. Franco has sent in a new medication for her (zoloft). Verbalizes understanding.

## 2023-07-07 ENCOUNTER — TELEPHONE (OUTPATIENT)
Dept: PALLIATIVE MEDICINE | Facility: CLINIC | Age: 73
End: 2023-07-07
Payer: MEDICARE

## 2023-07-10 ENCOUNTER — OFFICE VISIT (OUTPATIENT)
Dept: PALLIATIVE MEDICINE | Facility: CLINIC | Age: 73
End: 2023-07-10
Payer: MEDICARE

## 2023-07-10 DIAGNOSIS — G47.00 INSOMNIA, UNSPECIFIED TYPE: ICD-10-CM

## 2023-07-10 DIAGNOSIS — C56.9 MALIGNANT NEOPLASM OF OVARY, UNSPECIFIED LATERALITY: ICD-10-CM

## 2023-07-10 DIAGNOSIS — F43.23 ADJUSTMENT DISORDER WITH MIXED ANXIETY AND DEPRESSED MOOD: Primary | ICD-10-CM

## 2023-07-10 PROCEDURE — 90837 PSYTX W PT 60 MINUTES: CPT | Mod: 95,,, | Performed by: SOCIAL WORKER

## 2023-07-10 PROCEDURE — 90837 PR PSYCHOTHERAPY W/PATIENT, 60 MIN: ICD-10-PCS | Mod: 95,,, | Performed by: SOCIAL WORKER

## 2023-07-10 RX ORDER — ALPRAZOLAM 0.25 MG/1
TABLET ORAL
Qty: 180 TABLET | Refills: 0 | Status: SHIPPED | OUTPATIENT
Start: 2023-07-10 | End: 2023-10-09

## 2023-07-10 NOTE — PROGRESS NOTES
"Individual Psychotherapy (PhD/LCSW)    7/10/2023    The patient location is: [place], [place]  The chief complaint leading to consultation is: Depression, Anxiety, Sleep    Visit type: audiovisual    Face to Face time with patient: 58  60 minutes of total time spent on the encounter, which includes face to face time and non-face to face time preparing to see the patient (eg, review of tests), Obtaining and/or reviewing separately obtained history, Documenting clinical information in the electronic or other health record, Independently interpreting results (not separately reported) and communicating results to the patient/family/caregiver, or Care coordination (not separately reported).     Each patient to whom he or she provides medical services by telemedicine is:  (1) informed of the relationship between the physician and patient and the respective role of any other health care provider with respect to management of the patient; and (2) notified that he or she may decline to receive medical services by telemedicine and may withdraw from such care at any time.      Site:  [facility]         Therapeutic Intervention: Met with patient.  Outpatient - Insight oriented psychotherapy 60 min - CPT code 48451 and Outpatient - Supportive psychotherapy 60 min - CPT Code 61807    Chief complaint/reason for encounter: depression and anxiety     Interval history and content of current session:     LCSW and patient completed follow up psychotherapy session this date, virtually. She presents as alert, oriented x3, with a pleasant affect. She continues to have difficulty with back pain and sciatic nerve pain. She continues to do the home physical therapy exercises which do provide her temporary relief.     Ongoing reflection on her relationship with her daughter and son over the past ten years. She has actively been working on strengthening her communication. She feels her children are responding with "coldness" Explored pt's " anxiety and utilized anxiety reduction techniques in order to identify pt's realistic verus unrealistic thought processes.   Goals discussed and reviewed. Pt goals are to reduce negative automatic thoughts, reduce physical symptoms of anxiety, reduce time spent worrying (<30 min/per day) , acquire awareness towards stress/anxiety, develop adaptive choices for problem solving and coping, and acquire relapse prevention skills. Pt receptive to psychotherapy. Wishes to return in 2 week.    Treatment plan:  Target symptoms: recurrent depression, anxiety   Why chosen therapy is appropriate versus another modality: relevant to diagnosis, patient responds to this modality, evidence based practice  Outcome monitoring methods: self-report, observation, checklist/rating scale  Therapeutic intervention type: insight oriented psychotherapy, supportive psychotherapy    Risk parameters:  Patient reports no suicidal ideation  Patient reports no homicidal ideation  Patient reports no self-injurious behavior  Patient reports no violent behavior    Verbal deficits: None    Patient's response to intervention:  The patient's response to intervention is accepting.    Progress toward goals and other mental status changes:  The patient's progress toward goals is good.    Diagnosis:     ICD-10-CM ICD-9-CM   1. Adjustment disorder with mixed anxiety and depressed mood  F43.23 309.28   2. Insomnia, unspecified type  G47.00 780.52       Plan:  individual psychotherapy    Return to clinic: 2 weeks    Length of Service (minutes): 58

## 2023-07-18 ENCOUNTER — TELEPHONE (OUTPATIENT)
Dept: GYNECOLOGIC ONCOLOGY | Facility: CLINIC | Age: 73
End: 2023-07-18
Payer: MEDICARE

## 2023-07-18 DIAGNOSIS — R26.2 IMPAIRED AMBULATION: ICD-10-CM

## 2023-07-18 DIAGNOSIS — C56.9 MALIGNANT NEOPLASM OF OVARY, UNSPECIFIED LATERALITY: Primary | ICD-10-CM

## 2023-07-18 NOTE — TELEPHONE ENCOUNTER
Returned call and LM  Order for cane placed    ----- Message from Cam Verma MA sent at 7/18/2023  1:20 PM CDT -----  Hi, the patient is returning serina call she said that she need a cane not a walker. Thanks Cam

## 2023-07-18 NOTE — TELEPHONE ENCOUNTER
----- Message from Alona Ford MA sent at 7/18/2023  3:28 PM CDT -----  This patient left a voicemail stating that Dr Luque's NP called stating that she was requesting a walker. The patient stated that she does not need a walker and that she wants a Cane instead

## 2023-07-19 DIAGNOSIS — R26.2 IMPAIRED AMBULATION: Primary | ICD-10-CM

## 2023-07-19 PROBLEM — M54.9 BACK PAIN: Status: ACTIVE | Noted: 2023-07-19

## 2023-07-19 PROBLEM — S22.080A COMPRESSION FRACTURE OF T12 VERTEBRA: Status: ACTIVE | Noted: 2023-07-19

## 2023-07-19 PROBLEM — S32.040A COMPRESSION FRACTURE OF L4 VERTEBRA: Status: ACTIVE | Noted: 2023-07-19

## 2023-08-03 ENCOUNTER — TELEPHONE (OUTPATIENT)
Dept: PALLIATIVE MEDICINE | Facility: CLINIC | Age: 73
End: 2023-08-03
Payer: MEDICARE

## 2023-08-04 ENCOUNTER — OFFICE VISIT (OUTPATIENT)
Dept: PALLIATIVE MEDICINE | Facility: CLINIC | Age: 73
End: 2023-08-04
Payer: MEDICARE

## 2023-08-04 DIAGNOSIS — G47.00 INSOMNIA, UNSPECIFIED TYPE: ICD-10-CM

## 2023-08-04 DIAGNOSIS — F43.23 ADJUSTMENT DISORDER WITH MIXED ANXIETY AND DEPRESSED MOOD: Primary | ICD-10-CM

## 2023-08-04 PROCEDURE — 90837 PSYTX W PT 60 MINUTES: CPT | Mod: 95,,, | Performed by: SOCIAL WORKER

## 2023-08-04 PROCEDURE — 90837 PR PSYCHOTHERAPY W/PATIENT, 60 MIN: ICD-10-PCS | Mod: 95,,, | Performed by: SOCIAL WORKER

## 2023-08-04 NOTE — PROGRESS NOTES
"Individual Psychotherapy (PhD/LCSW)    8/4/2023    The patient location is: [place], [place]  The chief complaint leading to consultation is: Depression, Anxiety, Sleep    Visit type: audiovisual    Face to Face time with patient: 58  60 minutes of total time spent on the encounter, which includes face to face time and non-face to face time preparing to see the patient (eg, review of tests), Obtaining and/or reviewing separately obtained history, Documenting clinical information in the electronic or other health record, Independently interpreting results (not separately reported) and communicating results to the patient/family/caregiver, or Care coordination (not separately reported).     Each patient to whom he or she provides medical services by telemedicine is:  (1) informed of the relationship between the physician and patient and the respective role of any other health care provider with respect to management of the patient; and (2) notified that he or she may decline to receive medical services by telemedicine and may withdraw from such care at any time.      Site:  [facility]         Therapeutic Intervention: Met with patient.  Outpatient - Insight oriented psychotherapy 60 min - CPT code 44144 and Outpatient - Supportive psychotherapy 60 min - CPT Code 02200    Chief complaint/reason for encounter: depression and anxiety     Interval history and content of current session:     LCSW and patient completed follow up psychotherapy session this date, virtually. She presents as alert, oriented x3, with a pleasant affect. She states she is "doing better". She had a procedure on her back two weeks ago and has received significant relief as of the past few days. This has made mobility easier. She had follow up appointment with her orthopedist and she has been cleared to begin lite exercise. She will begin to go to physical therapy outpatient, rather than in-home. She is looking forward to this as she feels it will be " more structured and rigorous. She states she was researching and read that Zoloft impacts bone loss. Due to reading this information, she weaned herself off 4 days ago. She inquired about switching to Wellbutrin, which she has not tried it the past but feels it has less negative impact. She will discuss this with palliative medicine provider at next appointment.   She feels her RLS remains present and was asking if she could increase her requip prescription. Dr. Luque, gyn onc, is the prescribing provider. Encouraged Ruiz to contact Dr. Luque's office with this question.     Explored pt's anxiety and utilized anxiety reduction techniques in order to identify pt's realistic verus unrealistic thought processes.   Goals discussed and reviewed. Pt goals are to reduce negative automatic thoughts, reduce physical symptoms of anxiety, reduce time spent worrying (<30 min/per day) , acquire awareness towards stress/anxiety, develop adaptive choices for problem solving and coping, and acquire relapse prevention skills. Pt receptive to psychotherapy. Wishes to return in 2 week.    Treatment plan:  Target symptoms: recurrent depression, anxiety   Why chosen therapy is appropriate versus another modality: relevant to diagnosis, patient responds to this modality, evidence based practice  Outcome monitoring methods: self-report, observation, checklist/rating scale  Therapeutic intervention type: insight oriented psychotherapy, supportive psychotherapy    Risk parameters:  Patient reports no suicidal ideation  Patient reports no homicidal ideation  Patient reports no self-injurious behavior  Patient reports no violent behavior    Verbal deficits: None    Patient's response to intervention:  The patient's response to intervention is accepting.    Progress toward goals and other mental status changes:  The patient's progress toward goals is good.    Diagnosis:     ICD-10-CM ICD-9-CM   1. Adjustment disorder with mixed anxiety and  depressed mood  F43.23 309.28   2. Insomnia, unspecified type  G47.00 780.52     Plan:  individual psychotherapy    Return to clinic: 2 weeks    Length of Service (minutes): 58

## 2023-08-09 ENCOUNTER — TELEPHONE (OUTPATIENT)
Dept: GYNECOLOGIC ONCOLOGY | Facility: CLINIC | Age: 73
End: 2023-08-09
Payer: MEDICARE

## 2023-08-09 ENCOUNTER — PATIENT MESSAGE (OUTPATIENT)
Dept: GYNECOLOGIC ONCOLOGY | Facility: CLINIC | Age: 73
End: 2023-08-09
Payer: MEDICARE

## 2023-08-09 NOTE — TELEPHONE ENCOUNTER
----- Message from Davina Mc sent at 8/9/2023  1:15 PM CDT -----  Name of Who is Calling: HAASE, MURLENE MARY [[id number]]            What is the request in detail: Patient is requesting call back about prev mesg              Can the clinic reply by [facility]: yes              [facility] [phone]

## 2023-08-10 ENCOUNTER — PATIENT MESSAGE (OUTPATIENT)
Dept: GYNECOLOGIC ONCOLOGY | Facility: CLINIC | Age: 73
End: 2023-08-10
Payer: MEDICARE

## 2023-08-10 DIAGNOSIS — G25.81 RESTLESS LEG: Primary | ICD-10-CM

## 2023-08-10 RX ORDER — ROPINIROLE 4 MG/1
4 TABLET, FILM COATED ORAL NIGHTLY
Qty: 90 TABLET | Refills: 3 | Status: SHIPPED | OUTPATIENT
Start: 2023-08-10 | End: 2024-08-09

## 2023-08-16 ENCOUNTER — PATIENT MESSAGE (OUTPATIENT)
Dept: GYNECOLOGIC ONCOLOGY | Facility: CLINIC | Age: 73
End: 2023-08-16
Payer: MEDICARE

## 2023-08-18 ENCOUNTER — OFFICE VISIT (OUTPATIENT)
Dept: PALLIATIVE MEDICINE | Facility: CLINIC | Age: 73
End: 2023-08-18
Payer: MEDICARE

## 2023-08-18 DIAGNOSIS — F43.23 ADJUSTMENT DISORDER WITH MIXED ANXIETY AND DEPRESSED MOOD: Primary | ICD-10-CM

## 2023-08-18 DIAGNOSIS — G47.00 INSOMNIA, UNSPECIFIED TYPE: ICD-10-CM

## 2023-08-18 PROCEDURE — 90837 PSYTX W PT 60 MINUTES: CPT | Mod: 95,,, | Performed by: SOCIAL WORKER

## 2023-08-18 PROCEDURE — 90837 PR PSYCHOTHERAPY W/PATIENT, 60 MIN: ICD-10-PCS | Mod: 95,,, | Performed by: SOCIAL WORKER

## 2023-08-18 NOTE — PROGRESS NOTES
"Individual Psychotherapy (PhD/LCSW)    8/18/2023    The patient location is: [place], [place]  The chief complaint leading to consultation is: Depression, Anxiety, Sleep    Visit type: audiovisual    Face to Face time with patient: 56  60 minutes of total time spent on the encounter, which includes face to face time and non-face to face time preparing to see the patient (eg, review of tests), Obtaining and/or reviewing separately obtained history, Documenting clinical information in the electronic or other health record, Independently interpreting results (not separately reported) and communicating results to the patient/family/caregiver, or Care coordination (not separately reported).     Each patient to whom he or she provides medical services by telemedicine is:  (1) informed of the relationship between the physician and patient and the respective role of any other health care provider with respect to management of the patient; and (2) notified that he or she may decline to receive medical services by telemedicine and may withdraw from such care at any time.      Site:  [facility]         Therapeutic Intervention: Met with patient.  Outpatient - Insight oriented psychotherapy 60 min - CPT code 00511 and Outpatient - Supportive psychotherapy 60 min - CPT Code 02827    Chief complaint/reason for encounter: depression and anxiety     Interval history and content of current session:     LCSW and patient completed follow up psychotherapy session this date, virtually. She presents as alert, oriented x3, with a pleasant affect. She states she is "in pain" regarding her back and feels her IBS has been acting up the past week. She continues to go to physical therapy twice a week and while pain remains present, she recognizes improvement in her mobility.     She states she was researching and read that Zoloft impacts bone loss. Due to reading this information, she weaned herself off 4 days ago. She inquired about switching " to Wellbutrin, which she has not tried it the past but feels it has less negative impact. She will discuss this with palliative medicine provider at next appointment. She feels her RLS remains present and increased her requip prescription to 4 mg.      Explored pt's anxiety and utilized anxiety reduction techniques in order to identify pt's realistic verus unrealistic thought processes.   Goals discussed and reviewed. Pt goals are to reduce negative automatic thoughts, reduce physical symptoms of anxiety, reduce time spent worrying (<30 min/per day) , acquire awareness towards stress/anxiety, develop adaptive choices for problem solving and coping, and acquire relapse prevention skills. Pt receptive to psychotherapy. Wishes to return in 3 weeks.    Treatment plan:  Target symptoms: recurrent depression, anxiety   Why chosen therapy is appropriate versus another modality: relevant to diagnosis, patient responds to this modality, evidence based practice  Outcome monitoring methods: self-report, observation, checklist/rating scale  Therapeutic intervention type: insight oriented psychotherapy, supportive psychotherapy    Risk parameters:  Patient reports no suicidal ideation  Patient reports no homicidal ideation  Patient reports no self-injurious behavior  Patient reports no violent behavior    Verbal deficits: None    Patient's response to intervention:  The patient's response to intervention is accepting.    Progress toward goals and other mental status changes:  The patient's progress toward goals is good.    Diagnosis:     ICD-10-CM ICD-9-CM   1. Adjustment disorder with mixed anxiety and depressed mood  F43.23 309.28   2. Insomnia, unspecified type  G47.00 780.52     Plan:  individual psychotherapy    Return to clinic: 3 weeks    Length of Service (minutes): 56

## 2023-08-30 ENCOUNTER — TELEPHONE (OUTPATIENT)
Dept: PALLIATIVE MEDICINE | Facility: CLINIC | Age: 73
End: 2023-08-30
Payer: MEDICARE

## 2023-08-31 ENCOUNTER — OFFICE VISIT (OUTPATIENT)
Dept: PALLIATIVE MEDICINE | Facility: CLINIC | Age: 73
End: 2023-08-31
Payer: MEDICARE

## 2023-08-31 DIAGNOSIS — R53.0 NEOPLASTIC (MALIGNANT) RELATED FATIGUE: ICD-10-CM

## 2023-08-31 DIAGNOSIS — M79.7 FIBROMYALGIA: ICD-10-CM

## 2023-08-31 DIAGNOSIS — M54.42 ACUTE BILATERAL LOW BACK PAIN WITH BILATERAL SCIATICA: ICD-10-CM

## 2023-08-31 DIAGNOSIS — F43.23 ADJUSTMENT DISORDER WITH MIXED ANXIETY AND DEPRESSED MOOD: ICD-10-CM

## 2023-08-31 DIAGNOSIS — R14.0 BLOATING SYMPTOM: ICD-10-CM

## 2023-08-31 DIAGNOSIS — G47.00 INSOMNIA, UNSPECIFIED TYPE: ICD-10-CM

## 2023-08-31 DIAGNOSIS — M54.41 ACUTE BILATERAL LOW BACK PAIN WITH BILATERAL SCIATICA: ICD-10-CM

## 2023-08-31 DIAGNOSIS — K58.2 IRRITABLE BOWEL SYNDROME WITH BOTH CONSTIPATION AND DIARRHEA: ICD-10-CM

## 2023-08-31 DIAGNOSIS — Z71.89 ADVANCED CARE PLANNING/COUNSELING DISCUSSION: ICD-10-CM

## 2023-08-31 DIAGNOSIS — G89.3 CANCER RELATED PAIN: ICD-10-CM

## 2023-08-31 DIAGNOSIS — Z51.5 ENCOUNTER FOR PALLIATIVE CARE: ICD-10-CM

## 2023-08-31 DIAGNOSIS — C48.2 PRIMARY PERITONEAL ADENOCARCINOMA: Primary | ICD-10-CM

## 2023-08-31 PROCEDURE — 99215 PR OFFICE/OUTPT VISIT, EST, LEVL V, 40-54 MIN: ICD-10-PCS | Mod: 95,,, | Performed by: STUDENT IN AN ORGANIZED HEALTH CARE EDUCATION/TRAINING PROGRAM

## 2023-08-31 PROCEDURE — 99417 PROLNG OP E/M EACH 15 MIN: CPT | Mod: 95,,, | Performed by: STUDENT IN AN ORGANIZED HEALTH CARE EDUCATION/TRAINING PROGRAM

## 2023-08-31 PROCEDURE — 99215 OFFICE O/P EST HI 40 MIN: CPT | Mod: 95,,, | Performed by: STUDENT IN AN ORGANIZED HEALTH CARE EDUCATION/TRAINING PROGRAM

## 2023-08-31 PROCEDURE — 99417 PR PROLONGED SVC, OUTPT, W/WO DIRECT PT CONTACT,  EA ADDTL 15 MIN: ICD-10-PCS | Mod: 95,,, | Performed by: STUDENT IN AN ORGANIZED HEALTH CARE EDUCATION/TRAINING PROGRAM

## 2023-08-31 RX ORDER — DOXEPIN 3 MG/1
3 TABLET, FILM COATED ORAL NIGHTLY PRN
Qty: 90 TABLET | Refills: 0 | Status: SHIPPED | OUTPATIENT
Start: 2023-08-31 | End: 2023-10-16

## 2023-08-31 RX ORDER — GABAPENTIN 600 MG/1
600 TABLET ORAL 2 TIMES DAILY
Qty: 180 TABLET | Refills: 0 | Status: SHIPPED | OUTPATIENT
Start: 2023-08-31 | End: 2023-11-24

## 2023-08-31 NOTE — PROGRESS NOTES
Consult Note  Palliative Care    The patient location is: home  The chief complaint leading to consultation is: symptom management and ACP    Visit type: audiovisual    Face to Face time with patient: 53 minutes    70 minutes of total time spent on the encounter, which includes face to face time and non-face to face time preparing to see the patient (eg, review of tests), Obtaining and/or reviewing separately obtained history, Documenting clinical information in the electronic or other health record, Independently interpreting results (not separately reported) and communicating results to the patient/family/caregiver, or Care coordination (not separately reported).     Each patient to whom he or she provides medical services by telemedicine is:  (1) informed of the relationship between the physician and patient and the respective role of any other health care provider with respect to management of the patient; and (2) notified that he or she may decline to receive medical services by telemedicine and may withdraw from such care at any time.    Reason for Consult: symptom management and ACP      ASSESSMENT/PLAN:     Plan/Recommendations:  Diagnoses and all orders for this visit:    Primary peritoneal adenocarcinoma  - followed by Dr. Luque  - initial diagnosis in 2013; please see oncology notes for full details of treatment  - has elected to stop disease directed therapy at this time; last chemotherapy treatment in March 2023    Encounter for palliative care/Advanced care planning  Advance Care Planning   - patient decisional  - patient by herself today on telemedicine visit  - no ACP documents uploaded into EMR  - philosophy of Palliative Medicine reviewed with patient at first visit  - new patient folder information sent in mail to patient after first visit  - during discussion of ACP booklet; patient states she has completed ACP documents already.   - patient verbally named her daughter Randee as her HCPOA  - today  "introduced philosophy of hospice care including services provided included but not limited to nursing visits, nursing aides, dme, medications, sw/, and 24 hour number. All questions/concerns were addressed  - patient is not interested in enrolling in hospice at this time  - goals: improved quality of life and remaining independent. Patient states that she wants to prolong her life but limits to invasive therapies     Cancer related pain/Bloating symptom/Irritable bowel syndrome with both constipation and diarrhea/Fibromyalgia  - patient reporting having increased symptoms of abdominal pain along with pain from her fibromyalgia. She has also been having increased bloating and irritability in her abdomen from IBS  - patient reports that she still is experiencing pain, especially in her sciatic nerve  - patient recently underwent back surgery ~ 6 weeks ago with some relief of back pain  - patient's surgery team has increased her gabapentin to 600 mg BID, refill sent today  - patient holding PT and exercises per surgery team  - no need for opioid medication at this time; instructed patient to discuss with her surgery team about if she can use meloxicam that she previously received  - patient with no good bowel movement in four days; discussed using x-lax vs miralax to help with constipation  - instructed patient to set up follow up with GI doctor, but patient states she will defer at this time  - will continue to monitor    Adjustment disorder with mixed anxiety and depressed mood  - patient reporting worsening feeling of anxiety/depression and overall increased mental health needs  - patient states that she wants peace and and doesn't want to ask God "why". She states it is harder to deal with all aspects of her cancer as this is the 4th time she is dealing with the disease  - patient states she is trying to figure out if she made the correct decision to stop treatment.  - emotional support provided today  - " patient follows with EDOUARD Alarcon  - per Dr. Alarcon's note: patient has weaned herself off of zoloft  - continue xanax 0.25 mg as prescribed by another provider; defer all refills and adjustments to prescribing physician    Neoplastic (malignant) related fatigue/Insomnia  - patient with worsening insomnia and overall fatigue  - patient trying to stay active but she is sometimes unable to complete all activities that she wants to  - patient reports that she has some racing thoughts at night as well as having pain that keeps her up  - ambien not helpful   - stop ambien today  - start doxepin 3 mg qhs prn  - discussed body scan as well as writing down her worries that are keeping her up at night    Understanding of illness/Prognosis: patient with good understanding of illness; prognosis is guarded    Goals of care: life prolonging    Follow up: ~ 12 weeks    Patient's encounter and above plan of care discussed with patient's palliative med DSW    SUBJECTIVE:     History of Present Illness:  Patient is a 73 y.o. year old female with fibromyalgia, insomnia, and primary peritoneal carcinomatosis presents to Palliative Medicine for symptom management and ACP. Please see oncology notes for full details of oncologic history and treatment course    08/31/2023:  LA  reviewed and summarized:  07/20/2023 Hydrocodone-apap  mg Disp: 28 for 7 days  07/11/2023 Gabapentin 300 mg Disp: 180 for 90 days  07/11/2023 Alprazolam 0.25 mg Disp: 180 for 90 days    Patient presents today by herself on telemedicine visit. Patient recently had back surgery about 6 weeks ago. Patient continues to have moderate pain despite surgery. Patient increased gabapentin to 600 mg BID after surgery team recommendation. Patient having continued anxiety. She has days that are better than others, but she wants to focus on staying positive. She states ambien does not help with sleep. She has also been more fatigued. She is not able to do PT per surgery  recommendations. Patient also with continued RLS symptoms. Patient also feeling more constipated and ongoing issues with IBS.     06/15/2023:  LA  reviewed and summarized:  06/12/2023 Gabapentin 300 mg Disp: 60 for 30 days  06/06/2023 Gabapentin 100 mg Disp: 60 for 30 days    Per chart review, patient has elected not to pursue continued disease directed therapy. DME for patient including walker and bedside commode ordered for patient between visits. Today patient states she is doing better with her new equipment. She continues to have pain, but it is better with increased medication and PT. Patient reporting poor sleep and increased fatigue. She states that she wants to remain independent and not have the symptom burden of treatment.     05/29/2023:  Audio only visit. Please see notes for full details of oncologic history and treatment course.     05/04/2023:  LA  reviewed and summarized:  04/14/2023 Alprazolam 0.25 mg Disp: 180 for 90 days  04/04/2023 Hydrocodone-apap 5-325 mg disp: 12 for 2 days  03/22/2023 Zolpidem tartrate 5 mg Disp: 30 for 30 days    Patient reporting that she has been dealing with her diagnosis for past 10 years, and this is her fourth time receiving treatment. Patient reports that it is most important for her to feel better. Overall patient states it is harder this time dealing with her pain, fatigue, insomnia, fibromyalgia, and IBS. Patient is open to referral to for mental health services.     Past Medical History:   Diagnosis Date    Arthritis     Back pain     BRCA2 gene mutation positive in female 04/24/2018    Cataract     BILATERAL    Elevated cancer antigen 125 (CA-125) 05/18/2016    Fibromyalgia     Gastritis     History of chemotherapy     Insomnia due to medical condition 06/29/2021    Neuromuscular disorder     Osteopenia     Primary insomnia 01/03/2020    Primary peritoneal carcinomatosis 7/26/2013 AND 2017    Stage IIIC subopitimally debulked.     Well woman exam with  routine gynecological exam 02/10/2015     Past Surgical History:   Procedure Laterality Date    APPENDECTOMY      BREAST BIOPSY      CHOLECYSTECTOMY      CYST REMOVAL      left ear, right breast    FIXATION KYPHOPLASTY N/A 7/19/2023    Procedure: KYPHOPLASTY T12 & L4 with BX;  Surgeon: Sudeep Arevalo MD;  Location: [facility];  Service: Orthopedics;  Laterality: N/A;    HYSTERECTOMY      vaginal/prolapse    TX REMOVAL OF OVARY/TUBE(S)      SALPINGOOPHORECTOMY  7/25/13    laparotomy     TONSILLECTOMY       Family History   Problem Relation Age of Onset    Cancer Mother         gastric cancer    Heart disease Father 69        MI    Cancer Paternal Aunt         gastric cancer    Ovarian cancer Neg Hx     Uterine cancer Neg Hx     Breast cancer Neg Hx     Colon cancer Neg Hx      Review of patient's allergies indicates:   Allergen Reactions    Carboplatin Hives and Shortness Of Breath    Diflucan [fluconazole] Rash       Medications:    Current Outpatient Medications:     ALPRAZolam (XANAX) 0.25 MG tablet, Take 1 tablet by mouth twice daily as needed for anxiety, Disp: 180 tablet, Rfl: 0    cetirizine (ZYRTEC) 10 MG tablet, Take 1 tablet (10 mg total) by mouth once daily., Disp: 90 tablet, Rfl: 0    gabapentin (NEURONTIN) 300 MG capsule, Take 1 capsule (300 mg total) by mouth 2 (two) times daily., Disp: 180 capsule, Rfl: 0    LIDOcaine-prilocaine (EMLA) cream, Apply topically as needed., Disp: 30 g, Rfl: 1    ondansetron (ZOFRAN-ODT) 8 MG TbDL, Take 1 tablet (8 mg total) by mouth every 8 (eight) hours as needed (chemotherapy induced nausea)., Disp: 60 tablet, Rfl: 0    oxybutynin (DITROPAN) 5 MG Tab, Take 1 tablet (5 mg total) by mouth 2 (two) times daily., Disp: 180 tablet, Rfl: 3    rOPINIRole (REQUIP) 4 MG tablet, Take 1 tablet (4 mg total) by mouth nightly., Disp: 90 tablet, Rfl: 3    sertraline (ZOLOFT) 25 MG tablet, Take 1 tablet (25 mg total) by mouth once daily., Disp: 30 tablet, Rfl: 2    zolpidem (AMBIEN) 5 MG  Tab, Take 1 tablet (5 mg total) by mouth nightly as needed (insomnia)., Disp: 30 tablet, Rfl: 0  No current facility-administered medications for this visit.    Facility-Administered Medications Ordered in Other Visits:     LIDOcaine (PF) 10 mg/ml (1%) injection 10 mg, 1 mL, Intradermal, Once, Ward Abraham MD    phenylephrine-ketorolac (OMIDRIA) 1-0.3 % 500 mL irrigation, 1 each, Ocular Irrigation, Once, Ward Abraham MD    OBJECTIVE:       ROS:  Review of Systems   Constitutional:  Positive for activity change, appetite change and fatigue.   HENT: Negative.     Eyes: Negative.    Respiratory: Negative.     Cardiovascular: Negative.    Gastrointestinal:  Positive for constipation and diarrhea. Negative for abdominal pain.   Genitourinary: Negative.    Musculoskeletal:  Positive for arthralgias, back pain and myalgias.        + neuropathic pain   Skin: Negative.    Neurological: Negative.    Psychiatric/Behavioral:  Positive for sleep disturbance. Negative for dysphoric mood. The patient is nervous/anxious.    All other systems reviewed and are negative.      Review of Symptoms      Symptom Assessment (ESAS 0-10 Scale)  Pain:  6  Dyspnea:  0  Anxiety:  6  Nausea:  0  Depression:  0  Anorexia:  0  Fatigue:  7  Insomnia:  8  Restlessness:  0  Agitation:  0     CAM / Delirium:  Negative  Constipation:  Positive  Diarrhea:  Positive    Anxiety:  Is nervous/anxious  Constipation:  Constipation    Bowel Management Plan (BMP):  No      Pain Assessment:  OME in 24 hours:  0  Location(s): leg    Leg       Location: bilateral        Quality: Shooting        Quantity: 6/10 in intensity        Chronicity: Onset 4 (greater than) month(s) ago, unchanged        Aggravating Factors: Recumbency        Alleviating Factors: Activity        Associated Symptoms: None    Modified Chuck Scale:  0    ECOG Performance Status ndGndrndanddndend:nd nd2nd Living Arrangements:  Lives with family    Psychosocial/Cultural:   See Palliative  Psychosocial Note: Yes  Patient lives with her brother. Patient enjoys being active - shopping, visiting/helping people; gardening; reading the bible  **Primary  to Follow**  Palliative Care  Consult: No    Spiritual:  F - Carli and Belief:  Yes  I - Importance:  Very  C - Community:  Yes  A - Address in Care:  Needs met      Advance Care Planning   Advance Directives:   Living Will: No    LaPOST: No    Do Not Resuscitate Status: No    Medical Power of : No        Oral Declaration: Yes  Agent's Name:  Susana Garcia   Agent's Contact Number:  [phone]    Decision Making:  Patient answered questions  Goals of Care: What is most important right now is to focus on remaining as independent as possible, symptom/pain control, quality of life, even if it means sacrificing a little time. Accordingly, we have decided that the best plan to meet the patient's goals includes continuing with palliative care.          Physical Exam: limited due to telemedicine visit  Vitals:    Physical Exam  Constitutional:       General: She is not in acute distress.     Appearance: She is not toxic-appearing.   HENT:      Head: Normocephalic and atraumatic.      Right Ear: External ear normal.      Left Ear: External ear normal.      Nose: Nose normal.      Mouth/Throat:      Mouth: Mucous membranes are moist.   Eyes:      General: No scleral icterus.        Right eye: No discharge.         Left eye: No discharge.   Neck:      Comments: Trachea midline  Pulmonary:      Effort: Pulmonary effort is normal. No respiratory distress.   Musculoskeletal:      Cervical back: Normal range of motion.      Comments: Sitting up in chair; able to walk into view of camera without problems; moves upper extremities without difficulty   Skin:     Coloration: Skin is not jaundiced or pale.      Findings: No rash.   Neurological:      General: No focal deficit present.      Mental Status: She is alert and oriented to person,  "place, and time.      Cranial Nerves: No cranial nerve deficit.   Psychiatric:         Behavior: Behavior normal.         Thought Content: Thought content normal.         Judgment: Judgment normal.         Labs:  CBC:   WBC   Date Value Ref Range Status   07/20/2023 7.20 3.90 - 12.70 K/uL Final     Hemoglobin   Date Value Ref Range Status   07/20/2023 11.3 (L) 12.0 - 16.0 g/dL Final     Hematocrit   Date Value Ref Range Status   07/20/2023 33.9 (L) 37.0 - 48.5 % Final     MCV   Date Value Ref Range Status   07/20/2023 95 82 - 98 fL Final     Platelets   Date Value Ref Range Status   07/20/2023 242 150 - 450 K/uL Final       LFT:   Lab Results   Component Value Date    AST 41 (H) 07/19/2023    ALKPHOS 113 07/19/2023    BILITOT 0.5 07/19/2023       Albumin:   Albumin   Date Value Ref Range Status   07/19/2023 4.4 3.5 - 5.2 g/dL Final     Protein:   Total Protein   Date Value Ref Range Status   07/19/2023 8.1 6.3 - 8.2 g/dL Final       Radiology:I have reviewed all pertinent imaging results/findings within the past 24 hours.    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  This also includes non-face to face time preparing to see the patient (eg, review of tests/imaging), obtaining and/or reviewing separately obtained history, documenting clinical information in the electronic or other health record, independently interpreting results and communicating results to the patient/family/caregiver, or care coordinator.     Signature: Joyce Franco MD        "

## 2023-10-09 DIAGNOSIS — C56.9 MALIGNANT NEOPLASM OF OVARY, UNSPECIFIED LATERALITY: ICD-10-CM

## 2023-10-09 RX ORDER — ALPRAZOLAM 0.25 MG/1
TABLET ORAL
Qty: 180 TABLET | Refills: 0 | Status: SHIPPED | OUTPATIENT
Start: 2023-10-09 | End: 2024-01-05

## 2023-10-16 DIAGNOSIS — C48.2 PRIMARY PERITONEAL ADENOCARCINOMA: Primary | ICD-10-CM

## 2023-10-16 DIAGNOSIS — G47.00 INSOMNIA, UNSPECIFIED TYPE: ICD-10-CM

## 2023-10-16 RX ORDER — DOXEPIN 6 MG/1
6 TABLET, FILM COATED ORAL NIGHTLY PRN
Qty: 90 TABLET | Refills: 2 | Status: SHIPPED | OUTPATIENT
Start: 2023-10-16 | End: 2024-01-18 | Stop reason: SDUPTHER

## 2023-11-23 DIAGNOSIS — G89.3 CANCER RELATED PAIN: ICD-10-CM

## 2023-11-23 DIAGNOSIS — C48.2 PRIMARY PERITONEAL ADENOCARCINOMA: ICD-10-CM

## 2023-11-24 RX ORDER — GABAPENTIN 600 MG/1
600 TABLET ORAL 2 TIMES DAILY
Qty: 180 TABLET | Refills: 0 | Status: SHIPPED | OUTPATIENT
Start: 2023-11-24 | End: 2023-11-27 | Stop reason: SDUPTHER

## 2023-11-27 DIAGNOSIS — C48.2 PRIMARY PERITONEAL ADENOCARCINOMA: ICD-10-CM

## 2023-11-27 DIAGNOSIS — G89.3 CANCER RELATED PAIN: ICD-10-CM

## 2023-11-27 RX ORDER — GABAPENTIN 600 MG/1
600 TABLET ORAL 2 TIMES DAILY
Qty: 180 TABLET | Refills: 0 | Status: SHIPPED | OUTPATIENT
Start: 2023-11-27 | End: 2023-11-28 | Stop reason: SDUPTHER

## 2023-11-28 DIAGNOSIS — G89.3 CANCER RELATED PAIN: ICD-10-CM

## 2023-11-28 DIAGNOSIS — C48.2 PRIMARY PERITONEAL ADENOCARCINOMA: ICD-10-CM

## 2023-11-28 RX ORDER — GABAPENTIN 600 MG/1
600 TABLET ORAL 2 TIMES DAILY
Qty: 180 TABLET | Refills: 0 | Status: SHIPPED | OUTPATIENT
Start: 2023-11-28

## 2023-12-05 ENCOUNTER — TELEPHONE (OUTPATIENT)
Dept: PALLIATIVE MEDICINE | Facility: CLINIC | Age: 73
End: 2023-12-05
Payer: MEDICARE

## 2024-01-05 ENCOUNTER — TELEPHONE (OUTPATIENT)
Dept: GYNECOLOGIC ONCOLOGY | Facility: CLINIC | Age: 74
End: 2024-01-05
Payer: MEDICARE

## 2024-01-05 DIAGNOSIS — C56.9 MALIGNANT NEOPLASM OF OVARY, UNSPECIFIED LATERALITY: Primary | ICD-10-CM

## 2024-01-05 RX ORDER — ALPRAZOLAM 0.5 MG/1
0.5 TABLET ORAL 2 TIMES DAILY PRN
Qty: 180 TABLET | Refills: 0 | Status: SHIPPED | OUTPATIENT
Start: 2024-01-05 | End: 2024-04-04

## 2024-01-05 NOTE — TELEPHONE ENCOUNTER
Returned call to patient. No answer. Left voicemail.     Rx sent to pharmacy as requested.     ----- Message from Hema Dent RN sent at 1/4/2024 11:11 AM CST -----  Regarding: FW: pt call/refill    ----- Message -----  From: Nikkie Aaron  Sent: 1/4/2024  10:53 AM CST  To: [facility] Staff  Subject: pt call/refill                                   Name of Who is Calling: pt        What is the request in detail: pt is due for a refill on 1/9  for xanax, would like to know if she can get an increase and a 90 day supply because insurance pays better that way, please advise.        Can the clinic reply by [facility]: yes          [facility] Back [facility]: [phone]      [facility] [address] [place] [id number]  Phone: [phone] Fax: [phone]

## 2024-01-18 DIAGNOSIS — C48.2 PRIMARY PERITONEAL ADENOCARCINOMA: ICD-10-CM

## 2024-01-18 DIAGNOSIS — G47.00 INSOMNIA, UNSPECIFIED TYPE: ICD-10-CM

## 2024-01-18 RX ORDER — DOXEPIN 6 MG/1
6 TABLET, FILM COATED ORAL NIGHTLY PRN
Qty: 90 TABLET | Refills: 2 | Status: SHIPPED | OUTPATIENT
Start: 2024-01-18 | End: 2024-03-04

## 2024-03-04 ENCOUNTER — OFFICE VISIT (OUTPATIENT)
Dept: PALLIATIVE MEDICINE | Facility: CLINIC | Age: 74
End: 2024-03-04
Payer: MEDICARE

## 2024-03-04 DIAGNOSIS — G47.00 INSOMNIA, UNSPECIFIED TYPE: ICD-10-CM

## 2024-03-04 DIAGNOSIS — M79.7 FIBROMYALGIA: ICD-10-CM

## 2024-03-04 DIAGNOSIS — F43.23 ADJUSTMENT DISORDER WITH MIXED ANXIETY AND DEPRESSED MOOD: ICD-10-CM

## 2024-03-04 DIAGNOSIS — Z71.89 ADVANCED CARE PLANNING/COUNSELING DISCUSSION: ICD-10-CM

## 2024-03-04 DIAGNOSIS — G89.3 CANCER RELATED PAIN: ICD-10-CM

## 2024-03-04 DIAGNOSIS — M80.00XA OSTEOPOROSIS WITH CURRENT PATHOLOGICAL FRACTURE, UNSPECIFIED OSTEOPOROSIS TYPE, INITIAL ENCOUNTER: ICD-10-CM

## 2024-03-04 DIAGNOSIS — M54.41 ACUTE BILATERAL LOW BACK PAIN WITH BILATERAL SCIATICA: ICD-10-CM

## 2024-03-04 DIAGNOSIS — K58.2 IRRITABLE BOWEL SYNDROME WITH BOTH CONSTIPATION AND DIARRHEA: ICD-10-CM

## 2024-03-04 DIAGNOSIS — M54.42 ACUTE BILATERAL LOW BACK PAIN WITH BILATERAL SCIATICA: ICD-10-CM

## 2024-03-04 DIAGNOSIS — Z51.5 ENCOUNTER FOR PALLIATIVE CARE: ICD-10-CM

## 2024-03-04 DIAGNOSIS — R53.0 NEOPLASTIC (MALIGNANT) RELATED FATIGUE: ICD-10-CM

## 2024-03-04 DIAGNOSIS — C48.2 PRIMARY PERITONEAL ADENOCARCINOMA: Primary | ICD-10-CM

## 2024-03-04 PROCEDURE — 99497 ADVNCD CARE PLAN 30 MIN: CPT | Mod: 95,,, | Performed by: STUDENT IN AN ORGANIZED HEALTH CARE EDUCATION/TRAINING PROGRAM

## 2024-03-04 PROCEDURE — 99215 OFFICE O/P EST HI 40 MIN: CPT | Mod: 95,,, | Performed by: STUDENT IN AN ORGANIZED HEALTH CARE EDUCATION/TRAINING PROGRAM

## 2024-03-04 RX ORDER — MIRTAZAPINE 15 MG/1
15 TABLET, FILM COATED ORAL NIGHTLY
Qty: 90 TABLET | Refills: 2 | Status: SHIPPED | OUTPATIENT
Start: 2024-03-04

## 2024-03-04 NOTE — PROGRESS NOTES
Consult Note  Palliative Care    The patient location is: home  The chief complaint leading to consultation is: symptom management and ACP    Visit type: audiovisual    Face to Face time with patient: 42 minutes    64 minutes of total time spent on the encounter, which includes face to face time and non-face to face time preparing to see the patient (eg, review of tests), Obtaining and/or reviewing separately obtained history, Documenting clinical information in the electronic or other health record, Independently interpreting results (not separately reported) and communicating results to the patient/family/caregiver, or Care coordination (not separately reported).     Each patient to whom he or she provides medical services by telemedicine is:  (1) informed of the relationship between the physician and patient and the respective role of any other health care provider with respect to management of the patient; and (2) notified that he or she may decline to receive medical services by telemedicine and may withdraw from such care at any time.    Reason for Consult: symptom management and ACP      ASSESSMENT/PLAN:     Plan/Recommendations:  Diagnoses and all orders for this visit:    Primary peritoneal adenocarcinoma  - followed by Dr. Luque; has not seen in long time. Patient has upcoming appointment this Thursday with Dr. Obi Barrientos at [facility]   - initial diagnosis in 2013; please see oncology notes for full details of treatment  - has elected to stop disease directed therapy at this time; last chemotherapy treatment in March 2023  - per patient she feels that she does not have cancer anymore. She reports not being in denial, but that she does not feel sick and chooses to believe that she does not have cancer anymore.     Encounter for palliative care/Advanced care planning  Advance Care Planning   - patient decisional  - patient by herself today on telemedicine visit  - no ACP documents  uploaded into EMR  - philosophy of Palliative Medicine and new patient folder information sent in mail to patient after first visit. Patient reports never receiving the packet in the mail. Will ask Pall Med MA to mail new patient folder and ACP packet to Ms. Haase today.   - patient verbally named her daughter Randee Robison as her HCPOA  - previously introduced philosophy of hospice care including services provided included but not limited to nursing visits, nursing aides, dme, medications, sw/, and 24 hour number. All questions/concerns were addressed  - patient is not interested in enrolling in hospice at this time  - goals: improved quality of life and remaining independent. Patient states that she wants to prolong her life but limits to invasive therapies     Cancer related pain/Irritable bowel syndrome with both constipation and diarrhea/Fibromyalgia/Osteoporosis/fractures  - patient reporting moderate pain in her bones and muscles. Patient states she has broken more bones since last visit  - patient reports that she still is experiencing pain, especially in her sciatic nerve  - continue gabapentin 600 mg TID; this provides moderate relief  - patient stopped PT on her own  - patient uses OTC APAP and ibuprofen with some relief. Discussed that she should follow instructions on the bottle and not overuse due to side effects that can occur including liver damage, stomach issues, and kidney problems.  - patient following with orthopedist for osteoporosis and broken bones. She has elected to not pursue treatment of osteoporosis or surgery at this time.   - patient still having fluctuation in bowel movements so her abdomen will also become more bloated at times or bother her. Encouraged to follow up with GI specialist  - will continue to monitor    Adjustment disorder with mixed anxiety and depressed mood  - patient reporting moderate to severe anxiety today; she states that she always feels more anxious around  "doctors appointments  - she states she is usually trying to stay in a positive frame of mind. She does not increased thoughts/stress at night, which affects her sleep  - previously patient states that she wants peace and and doesn't want to ask God "why". She states it is harder to deal with all aspects of her cancer as this is the 4th time she is dealing with the disease  - emotional support provided today  - patient follows with EDOUARD Alarcon  - per Dr. Alarcon's note: patient has weaned herself off of zoloft  - start mirtazapine 15 mg qhs for sleep/mood  - continue xanax 0.25 mg as prescribed by another provider; defer all refills and adjustments to prescribing physician    Neoplastic (malignant) related fatigue/Insomnia  - patient with worsening insomnia and overall fatigue  - patient trying to stay active but she is sometimes unable to complete all activities that she wants to. She spoke of her plans for activities that she is doing. She tries to work out throughout the day as well  - patient reports that she has some racing thoughts at night as well as having pain that keeps her up  - ambien not helpful; doxepin not helpful  - start mirtazapine 15 mg qhs  - discussed body scan as well as writing down her worries that are keeping her up at night    Understanding of illness/Prognosis: patient with good understanding of illness; prognosis is guarded    Goals of care: life prolonging    Follow up: ~ 12 weeks    Patient's encounter and above plan of care discussed with patient's palliative med DSW    SUBJECTIVE:     History of Present Illness:  Patient is a 73 y.o. year old female with fibromyalgia, insomnia, and primary peritoneal carcinomatosis presents to Palliative Medicine for symptom management and ACP. Please see oncology notes for full details of oncologic history and treatment course    03/04/2024:  LA  reviewed and summarized:  02/28/2024 Gabapentin 600 mg Disp: 180 for 90 days  01/07/2024 Alprazolam 0.5 mg " Disp: 180 for 90 days     History of Present Illness    CHIEF COMPLAINT:  - Follow-up on overall health status  - Management of broken bones  - Insomnia  - Pain management    HISTORY OBTAINED FROM:  - Patient    HPI:  Patient has not been seen by either oncology or this clinic since August 2023. The patient reports having more broken bones and is scheduled to see a bone specialist named Gayle in [place] on the 17th of this month. She tries to lift weights when possible. The patient is able to take care of herself and is looking forward to improving her health. She experiences insomnia, with some nights being better than others. The patient has decided not to undergo surgery for the broken bones and has stopped physical therapy due to difficulty attending sessions. She takes gabapentin, which helps with pain management, and occasionally takes Tylenol. Patient reports increased IBS symptoms and is considering trying an OTC product called Reinier. She acknowledges the importance of drinking more water and plans to increase her water intake. The patient has a port that has been in place for 11 years and is considering having it removed, as she does not feel like she has cancer anymore. She is now seeing a new doctor for her port flush. The patient experiences anxiety and stress, particularly when seeing doctors. The patient denies any plans to undergo surgery for her broken bones or to resume cancer treatments.    GOALS OF CARE/ADVANCED DIRECTIVES:  - Desire to remain independent and prolong life  - Values quality of life over quantity  - Will stop cancer treatments  - Daughter, Susana Garcia, is the patient's healthcare power of     ACTIVITIES OF DAILY LIVING:  - Able to take care of herself independently  - Experiencing insomnia  - Stopped physical therapy due to difficulty in attending sessions  - Continues to do exercises at home  - Lifting weights as part of exercise routine  - Experienced broken bones and  advised to avoid certain activities  - Has a port for medical treatments for 11 years  - Takes bladder medication as needed  - Plan to get more involved in Confucianist and social activities once physical condition improves    SOCIAL HISTORY:  - Plan to get more involved in Confucianist, indicating Anab[employer]ist affiliation           08/31/2023:  LA  reviewed and summarized:  07/20/2023 Hydrocodone-apap  mg Disp: 28 for 7 days  07/11/2023 Gabapentin 300 mg Disp: 180 for 90 days  07/11/2023 Alprazolam 0.25 mg Disp: 180 for 90 days    Patient presents today by herself on telemedicine visit. Patient recently had back surgery about 6 weeks ago. Patient continues to have moderate pain despite surgery. Patient increased gabapentin to 600 mg BID after surgery team recommendation. Patient having continued anxiety. She has days that are better than others, but she wants to focus on staying positive. She states ambien does not help with sleep. She has also been more fatigued. She is not able to do PT per surgery recommendations. Patient also with continued RLS symptoms. Patient also feeling more constipated and ongoing issues with IBS.     06/15/2023:  LA  reviewed and summarized:  06/12/2023 Gabapentin 300 mg Disp: 60 for 30 days  06/06/2023 Gabapentin 100 mg Disp: 60 for 30 days    Per chart review, patient has elected not to pursue continued disease directed therapy. DME for patient including walker and bedside commode ordered for patient between visits. Today patient states she is doing better with her new equipment. She continues to have pain, but it is better with increased medication and PT. Patient reporting poor sleep and increased fatigue. She states that she wants to remain independent and not have the symptom burden of treatment.     05/29/2023:  Audio only visit. Please see notes for full details of oncologic history and treatment course.     05/04/2023:  LA  reviewed and summarized:  04/14/2023 Alprazolam 0.25 mg  Disp: 180 for 90 days  04/04/2023 Hydrocodone-apap 5-325 mg disp: 12 for 2 days  03/22/2023 Zolpidem tartrate 5 mg Disp: 30 for 30 days    Patient reporting that she has been dealing with her diagnosis for past 10 years, and this is her fourth time receiving treatment. Patient reports that it is most important for her to feel better. Overall patient states it is harder this time dealing with her pain, fatigue, insomnia, fibromyalgia, and IBS. Patient is open to referral to for mental health services.     Past Medical History:   Diagnosis Date    Arthritis     Back pain     BRCA2 gene mutation positive in female 04/24/2018    Cataract     BILATERAL    Elevated cancer antigen 125 (CA-125) 05/18/2016    Fibromyalgia     Gastritis     History of chemotherapy     Insomnia due to medical condition 06/29/2021    Neuromuscular disorder     Osteopenia     Primary insomnia 01/03/2020    Primary peritoneal carcinomatosis 7/26/2013 AND 2017    Stage IIIC subopitimally debulked.     Well woman exam with routine gynecological exam 02/10/2015     Past Surgical History:   Procedure Laterality Date    APPENDECTOMY      BREAST BIOPSY      CHOLECYSTECTOMY      CYST REMOVAL      left ear, right breast    FIXATION KYPHOPLASTY N/A 7/19/2023    Procedure: KYPHOPLASTY T12 & L4 with BX;  Surgeon: Sudeep Arevalo MD;  Location: [facility] OR;  Service: Orthopedics;  Laterality: N/A;    HYSTERECTOMY      vaginal/prolapse    KY REMOVAL OF OVARY/TUBE(S)      SALPINGOOPHORECTOMY  7/25/13    laparotomy     TONSILLECTOMY       Family History   Problem Relation Age of Onset    Cancer Mother         gastric cancer    Heart disease Father 69        MI    Cancer Paternal Aunt         gastric cancer    Ovarian cancer Neg Hx     Uterine cancer Neg Hx     Breast cancer Neg Hx     Colon cancer Neg Hx      Review of patient's allergies indicates:   Allergen Reactions    Carboplatin Hives and Shortness Of Breath    Diflucan [fluconazole] Rash        Medications:    Current Outpatient Medications:     ALPRAZolam (XANAX) 0.5 MG tablet, Take 1 tablet (0.5 mg total) by mouth 2 (two) times daily as needed for Anxiety., Disp: 180 tablet, Rfl: 0    cetirizine (ZYRTEC) 10 MG tablet, Take 1 tablet (10 mg total) by mouth once daily., Disp: 90 tablet, Rfl: 0    doxepin (SILENOR) 6 mg Tab, Take 6 mg by mouth nightly as needed (insomnia)., Disp: 90 tablet, Rfl: 2    gabapentin (NEURONTIN) 600 MG tablet, Take 1 tablet (600 mg total) by mouth 2 (two) times daily., Disp: 180 tablet, Rfl: 0    LIDOcaine-prilocaine (EMLA) cream, Apply topically as needed., Disp: 30 g, Rfl: 1    ondansetron (ZOFRAN-ODT) 8 MG TbDL, Take 1 tablet (8 mg total) by mouth every 8 (eight) hours as needed (chemotherapy induced nausea). (Patient not taking: Reported on 8/31/2023), Disp: 60 tablet, Rfl: 0    oxybutynin (DITROPAN) 5 MG Tab, Take 1 tablet (5 mg total) by mouth 2 (two) times daily., Disp: 180 tablet, Rfl: 3    rOPINIRole (REQUIP) 4 MG tablet, Take 1 tablet (4 mg total) by mouth nightly., Disp: 90 tablet, Rfl: 3    sertraline (ZOLOFT) 25 MG tablet, Take 1 tablet (25 mg total) by mouth once daily. (Patient not taking: Reported on 8/31/2023), Disp: 30 tablet, Rfl: 2  No current facility-administered medications for this visit.    Facility-Administered Medications Ordered in Other Visits:     LIDOcaine (PF) 10 mg/ml (1%) injection 10 mg, 1 mL, Intradermal, Once, Ward Abraham MD    phenylephrine-ketorolac (OMIDRIA) 1-0.3 % 500 mL irrigation, 1 each, Ocular Irrigation, Once, Ward Abraham MD    OBJECTIVE:       ROS:  Review of Systems   Constitutional:  Positive for activity change, appetite change and fatigue.   HENT: Negative.     Eyes: Negative.    Respiratory: Negative.     Cardiovascular: Negative.    Gastrointestinal:  Positive for constipation and diarrhea. Negative for abdominal pain.   Genitourinary: Negative.    Musculoskeletal:  Positive for arthralgias, back pain and  myalgias.        + neuropathic pain   Skin: Negative.    Neurological: Negative.    Psychiatric/Behavioral:  Positive for sleep disturbance. Negative for dysphoric mood. The patient is nervous/anxious.    All other systems reviewed and are negative.      Review of Symptoms      Symptom Assessment (ESAS 0-10 Scale)  Pain:  6  Dyspnea:  0  Anxiety:  7  Nausea:  0  Depression:  0  Anorexia:  0  Fatigue:  5  Insomnia:  8  Restlessness:  0  Agitation:  0     CAM / Delirium:  Negative  Constipation:  Positive  Diarrhea:  Positive    Anxiety:  Is nervous/anxious  Constipation:  Constipation    Bowel Management Plan (BMP):  No      Pain Assessment:  OME in 24 hours:  0  Location(s): generalized            LEG Chronicity Choices: greater than.  Generalized       Location: generalized        Quality: Aching        Quantity: 6/10 in intensity        Chronicity: Onset 1 year(s) ago, gradually worsening        Aggravating Factors: Activity        Alleviating Factors: Acetaminophen and NSAIDs        Associated Symptoms: None    Modified Chuck Scale:  0    ECOG Performance Status ndGndrndanddndend:nd nd2nd Living Arrangements:  Lives with family    Psychosocial/Cultural:   See Palliative Psychosocial Note: Yes  Patient lives with her brother. Patient enjoys being active - shopping, visiting/helping people; gardening; reading the bible  **Primary  to Follow**  Palliative Care  Consult: No    Spiritual:  F - Carli and Belief:  Yes  I - Importance:  Very  C - Community:  Yes  A - Address in Care:  Needs met      Advance Care Planning   Advance Directives:   Living Will: No    LaPOST: No    Do Not Resuscitate Status: No    Medical Power of : No        Oral Declaration: Yes  Agent's Name:  Randee Robison   Agent's Contact Number:  [phone]    Decision Making:  Patient answered questions  Goals of Care: What is most important right now is to focus on remaining as independent as possible, symptom/pain control, quality  of life, even if it means sacrificing a little time. Accordingly, we have decided that the best plan to meet the patient's goals includes continuing with palliative care and monitoring.          Physical Exam: limited due to telemedicine visit  Vitals:    Physical Exam  Constitutional:       General: She is not in acute distress.     Appearance: She is not toxic-appearing.   HENT:      Head: Normocephalic and atraumatic.      Right Ear: External ear normal.      Left Ear: External ear normal.      Nose: Nose normal.      Mouth/Throat:      Mouth: Mucous membranes are moist.   Eyes:      General: No scleral icterus.        Right eye: No discharge.         Left eye: No discharge.   Neck:      Comments: Trachea midline  Pulmonary:      Effort: Pulmonary effort is normal. No respiratory distress.   Musculoskeletal:      Cervical back: Normal range of motion.      Comments: Sitting up in chair; moves upper extremities without difficulty   Skin:     Coloration: Skin is not jaundiced or pale.      Findings: No rash.   Neurological:      General: No focal deficit present.      Mental Status: She is alert and oriented to person, place, and time.      Cranial Nerves: No cranial nerve deficit.   Psychiatric:         Behavior: Behavior normal.         Thought Content: Thought content normal.         Judgment: Judgment normal.         Labs:  CBC:   WBC   Date Value Ref Range Status   07/20/2023 7.20 3.90 - 12.70 K/uL Final     Hemoglobin   Date Value Ref Range Status   07/20/2023 11.3 (L) 12.0 - 16.0 g/dL Final     Hematocrit   Date Value Ref Range Status   07/20/2023 33.9 (L) 37.0 - 48.5 % Final     MCV   Date Value Ref Range Status   07/20/2023 95 82 - 98 fL Final     Platelets   Date Value Ref Range Status   07/20/2023 242 150 - 450 K/uL Final       LFT:   Lab Results   Component Value Date    AST 41 (H) 07/19/2023    ALKPHOS 113 07/19/2023    BILITOT 0.5 07/19/2023       Albumin:   Albumin   Date Value Ref Range Status  "  07/19/2023 4.4 3.5 - 5.2 g/dL Final     Protein:   Total Protein   Date Value Ref Range Status   07/19/2023 8.1 6.3 - 8.2 g/dL Final       Radiology:I have reviewed all pertinent imaging results/findings within the past 24 hours.    10/26/2023 MRI Thoracic spine: "1. Abnormal marrow signal intensity involving the T7 and T8 vertebral bodies extending into the pedicles, suspicious for metastatic disease in this patient with history of ovarian cancer. 2. Multilevel compression fractures."    10/26/2023 MRI lumbar spine: "1. Old compression fracture at the edge of the film at T11. 2. Since the previous study there is been a kyphoplasty of the fracture known on the previous study at T12 3. Old compression fracture of L1 with a central disc protrusion at L1-2 with facet changes and moderate left and mild right neural foramina narrowing and mild spinal stenosis 4. Old mild compression fracture of the superior anterior aspect of L3 unchanged and new kyphoplasty of the known compression fracture of L4 since the previous study. 5. Small central disc bulges throughout contributing to mild to moderate narrowing of the neural foramina"    I spent a total of 48 minutes of total of 64 minutes on the day of the visit.This includes face to face time in discussion of symptom assessment, coordination of care and emotional support.  This also includes non-face to face time preparing to see the patient (eg, review of tests/imaging), obtaining and/or reviewing separately obtained history, documenting clinical information in the electronic or other health record, independently interpreting results and communicating results to the patient/family/caregiver, or care coordinator.     A total of 16 min of total of 64 minutes was spent on advance care planning, goals of care discussion, emotional support, formulating and communicating prognosis and goals of care, exploring burden/benefit of various approaches of treatment. This discussion " occurred on a fully voluntary basis with the verbal consent of the patient and/or family    This note was generated with the assistance of ambient listening technology. Verbal consent was obtained by the patient and accompanying visitor(s) for the recording of patient appointment to facilitate this note. I attest to having reviewed and edited the generated note for accuracy, though some syntax or spelling errors may persist. Please contact the author of this note for any clarification.    Signature: Joyce Franco MD    Advance Care Planning     Date: 03/04/2024    Power of   I initiated the process of voluntary advance care planning today and explained the importance of this process to the patient.  I introduced the concept of advance directives to the patient, as well. Then the patient received detailed information about the importance of designating a Health Care Power of  (HCPOA). She was also instructed to communicate with this person about their wishes for future healthcare, should she become sick and lose decision-making capacity. The patient has not previously appointed a HCPOA. After our discussion, the patient has decided to complete a HCPOA and has appointed her daughter, health care agent:  Randee Robison  & health care agent number:  [phone]  I spent a total time of 16 minutes discussing this issue with the patient.

## 2024-04-03 DIAGNOSIS — C56.9 MALIGNANT NEOPLASM OF OVARY, UNSPECIFIED LATERALITY: ICD-10-CM

## 2024-04-03 NOTE — TELEPHONE ENCOUNTER
Refill Routing Note   Medication(s) are not appropriate for processing by [facility] for the following reason(s):        Outside of protocol    [facility] action(s):  Route               Appointments  past 12m or future 3m with PCP    Date Provider   Last Visit   6/7/2023 Rhonda Luque MD   Next Visit   Visit date not found Rhonda Luque MD   ED visits in past 90 days: 0        Note composed:12:17 PM 04/03/2024

## 2024-04-04 RX ORDER — ALPRAZOLAM 0.5 MG/1
0.5 TABLET ORAL 2 TIMES DAILY
Qty: 180 TABLET | Refills: 0 | Status: SHIPPED | OUTPATIENT
Start: 2024-04-04

## 2024-07-02 DIAGNOSIS — C56.9 MALIGNANT NEOPLASM OF OVARY, UNSPECIFIED LATERALITY: ICD-10-CM

## 2024-07-02 RX ORDER — ALPRAZOLAM 0.5 MG/1
0.5 TABLET ORAL 2 TIMES DAILY
Qty: 180 TABLET | Refills: 0 | Status: SHIPPED | OUTPATIENT
Start: 2024-07-02

## 2024-07-18 ENCOUNTER — PATIENT MESSAGE (OUTPATIENT)
Dept: PALLIATIVE MEDICINE | Facility: CLINIC | Age: 74
End: 2024-07-18
Payer: MEDICARE

## 2024-07-18 ENCOUNTER — PATIENT MESSAGE (OUTPATIENT)
Dept: GYNECOLOGIC ONCOLOGY | Facility: CLINIC | Age: 74
End: 2024-07-18
Payer: MEDICARE

## 2024-07-18 DIAGNOSIS — F43.23 ADJUSTMENT DISORDER WITH MIXED ANXIETY AND DEPRESSED MOOD: ICD-10-CM

## 2024-07-18 DIAGNOSIS — C48.2 PRIMARY PERITONEAL ADENOCARCINOMA: ICD-10-CM

## 2024-07-18 DIAGNOSIS — G89.3 CANCER RELATED PAIN: ICD-10-CM

## 2024-07-18 DIAGNOSIS — G25.81 RESTLESS LEG: ICD-10-CM

## 2024-07-18 DIAGNOSIS — G47.00 INSOMNIA, UNSPECIFIED TYPE: ICD-10-CM

## 2024-07-18 RX ORDER — HYDROCODONE BITARTRATE AND ACETAMINOPHEN 10; 325 MG/1; MG/1
1 TABLET ORAL EVERY 6 HOURS PRN
Qty: 60 TABLET | Refills: 0 | Status: CANCELLED | OUTPATIENT
Start: 2024-07-18 | End: 2024-08-02

## 2024-07-18 RX ORDER — GABAPENTIN 600 MG/1
600 TABLET ORAL 2 TIMES DAILY
Qty: 180 TABLET | Refills: 0 | Status: SHIPPED | OUTPATIENT
Start: 2024-07-18

## 2024-07-18 RX ORDER — ROPINIROLE 4 MG/1
4 TABLET, FILM COATED ORAL NIGHTLY
Qty: 90 TABLET | Refills: 3 | Status: SHIPPED | OUTPATIENT
Start: 2024-07-18 | End: 2025-07-18

## 2024-07-18 RX ORDER — MIRTAZAPINE 15 MG/1
15 TABLET, FILM COATED ORAL NIGHTLY
Qty: 90 TABLET | Refills: 2 | Status: SHIPPED | OUTPATIENT
Start: 2024-07-18

## 2024-08-14 DIAGNOSIS — N32.81 OVERACTIVE BLADDER: ICD-10-CM

## 2024-08-14 RX ORDER — OXYBUTYNIN CHLORIDE 5 MG/1
5 TABLET ORAL 2 TIMES DAILY
Qty: 180 TABLET | Refills: 3 | Status: SHIPPED | OUTPATIENT
Start: 2024-08-14 | End: 2025-08-14

## 2024-08-19 ENCOUNTER — PATIENT MESSAGE (OUTPATIENT)
Dept: PALLIATIVE MEDICINE | Facility: CLINIC | Age: 74
End: 2024-08-19

## 2024-08-19 ENCOUNTER — OFFICE VISIT (OUTPATIENT)
Dept: PALLIATIVE MEDICINE | Facility: CLINIC | Age: 74
End: 2024-08-19
Payer: MEDICARE

## 2024-08-19 DIAGNOSIS — F43.23 ADJUSTMENT DISORDER WITH MIXED ANXIETY AND DEPRESSED MOOD: ICD-10-CM

## 2024-08-19 DIAGNOSIS — Z71.89 ADVANCED CARE PLANNING/COUNSELING DISCUSSION: ICD-10-CM

## 2024-08-19 DIAGNOSIS — M80.00XA OSTEOPOROSIS WITH CURRENT PATHOLOGICAL FRACTURE, UNSPECIFIED OSTEOPOROSIS TYPE, INITIAL ENCOUNTER: ICD-10-CM

## 2024-08-19 DIAGNOSIS — K58.2 IRRITABLE BOWEL SYNDROME WITH BOTH CONSTIPATION AND DIARRHEA: ICD-10-CM

## 2024-08-19 DIAGNOSIS — G89.3 CANCER RELATED PAIN: ICD-10-CM

## 2024-08-19 DIAGNOSIS — R53.0 NEOPLASTIC (MALIGNANT) RELATED FATIGUE: ICD-10-CM

## 2024-08-19 DIAGNOSIS — C48.2 PRIMARY PERITONEAL ADENOCARCINOMA: Primary | ICD-10-CM

## 2024-08-19 DIAGNOSIS — M54.42 ACUTE BILATERAL LOW BACK PAIN WITH BILATERAL SCIATICA: ICD-10-CM

## 2024-08-19 DIAGNOSIS — G47.00 INSOMNIA, UNSPECIFIED TYPE: ICD-10-CM

## 2024-08-19 DIAGNOSIS — M54.41 ACUTE BILATERAL LOW BACK PAIN WITH BILATERAL SCIATICA: ICD-10-CM

## 2024-08-19 DIAGNOSIS — Z51.5 ENCOUNTER FOR PALLIATIVE CARE: ICD-10-CM

## 2024-08-19 PROCEDURE — 99215 OFFICE O/P EST HI 40 MIN: CPT | Mod: 95,,, | Performed by: STUDENT IN AN ORGANIZED HEALTH CARE EDUCATION/TRAINING PROGRAM

## 2024-08-19 RX ORDER — FLUOXETINE 10 MG/1
10 CAPSULE ORAL DAILY
Qty: 90 CAPSULE | Refills: 0 | Status: SHIPPED | OUTPATIENT
Start: 2024-08-19 | End: 2024-08-19

## 2024-08-19 RX ORDER — FLUOXETINE 10 MG/1
10 CAPSULE ORAL DAILY
Qty: 90 CAPSULE | Refills: 0 | Status: SHIPPED | OUTPATIENT
Start: 2024-08-19

## 2024-08-19 NOTE — PROGRESS NOTES
Consult Note  Palliative Care    The patient location is: home  The chief complaint leading to consultation is: symptom management and ACP    Visit type: audiovisual    Face to Face time with patient: 38 minutes    45 minutes of total time spent on the encounter, which includes face to face time and non-face to face time preparing to see the patient (eg, review of tests), Obtaining and/or reviewing separately obtained history, Documenting clinical information in the electronic or other health record, Independently interpreting results (not separately reported) and communicating results to the patient/family/caregiver, or Care coordination (not separately reported).     Each patient to whom he or she provides medical services by telemedicine is:  (1) informed of the relationship between the physician and patient and the respective role of any other health care provider with respect to management of the patient; and (2) notified that he or she may decline to receive medical services by telemedicine and may withdraw from such care at any time.    Reason for Consult: symptom management and ACP      ASSESSMENT/PLAN:     Plan/Recommendations:  Diagnoses and all orders for this visit:    Primary peritoneal adenocarcinoma  - followed by Dr. Luque; has not seen in long time. Patient has upcoming appointment this Thursday with Dr. Obi Barrientos at [facility]   - initial diagnosis in 2013; please see oncology notes for full details of treatment  - has elected to stop disease directed therapy at this time; last chemotherapy treatment in March 2023  - at previous visit per patient she feels that she does not have cancer anymore. She reports not being in denial, but that she does not feel sick and chooses to believe that she does not have cancer anymore.   - today discussed with patient about if she were to follow up with Dr. Luque for testing/imaging to see state of the cancer and would that change anything  for her decision wise. Patient states no, she would still not purse any cancer directed therapies.     Encounter for palliative care/Advanced care planning  Advance Care Planning   - patient decisional  - patient by herself today on telemedicine visit  - no ACP documents uploaded into EMR  - philosophy of Palliative Medicine and new patient folder information sent in mail to patient after first visit. Patient reports never receiving the packet in the mail. Will ask Pall Med MA to mail new patient folder and ACP packet to Ms. Haase today.   - patient verbally named her daughter Randee Robison as her HCPOA  - previously introduced philosophy of hospice care including services provided included but not limited to nursing visits, nursing aides, dme, medications, sw/, and 24 hour number. All questions/concerns were addressed  - patient is not interested in enrolling in hospice at this time  - goals: improved quality of life and remaining independent. Patient states that she wants to prolong her life but limits to invasive therapies     Cancer related pain/Irritable bowel syndrome with both constipation and diarrhea/Fibromyalgia/Osteoporosis/fractures  - patient reporting moderate pain in her bones and muscles. Patient states she has broken more bones since last visit  - patient reports that she still is experiencing pain, especially in her sciatic nerve  - continue gabapentin 600 mg TID; this provides moderate relief  - continue pain medication per another provider. Discussed in detail today as well as previously that this clinic is NOT able to prescribe controlled substances to her (eg. Pain medication, certain sleep aides, etc) as patient continues to decline to come to [place] to be seen in person due to transportation challenges.   - patient stopped PT on her own  - patient uses OTC APAP and ibuprofen with some relief. Discussed that she should follow instructions on the bottle and not overuse due to side  "effects that can occur including liver damage, stomach issues, and kidney problems.  - patient following with orthopedist for osteoporosis and broken bones. She has elected to not pursue treatment of osteoporosis or surgery at this time.   - patient still having fluctuation in bowel movements so her abdomen will also become more bloated at times or bother her. Encouraged to follow up with GI specialist  - will continue to monitor    Adjustment disorder with mixed anxiety and depressed mood  - patient reporting moderate to severe anxiety today; she states that she always feels more anxious around doctors appointments  - she states she is usually trying to stay in a positive frame of mind. She does not increased thoughts/stress at night, which affects her sleep  - previously patient states that she wants peace and and doesn't want to ask God "why". She states it is harder to deal with all aspects of her cancer as this is the 4th time she is dealing with the disease  - emotional support provided today  - patient previously followed with EDOUARD Alarcon  - per Dr. Alarcon's note: patient has weaned herself off of zoloft  - patient did not find benefit from mirtazapine 15 mg qhs for sleep/mood  - start fluoxetine 10 mg today  - continue xanax 0.25 mg as prescribed by another provider; defer all refills and adjustments to prescribing physician    Neoplastic (malignant) related fatigue/Insomnia  - patient with worsening insomnia and overall fatigue  - patient trying to stay active but she is sometimes unable to complete all activities that she wants to. She spoke of her plans for activities that she is doing. She tries to work out throughout the day as well  - patient reports that she has some racing thoughts at night as well as having pain that keeps her up  - ambien not helpful; doxepin not helpful; mirtazapine 15 mg qhs not helpful  - will work on trial of fluoxetine (per patient request) to help with anxiety which may help " with insomnia as well  - discussed body scan as well as writing down her worries that are keeping her up at night    Understanding of illness/Prognosis: patient with good understanding of illness; prognosis is guarded    Goals of care: life prolonging    Follow up: ~ 12 weeks    SUBJECTIVE:     History of Present Illness:  Patient is a 74 y.o. year old female with fibromyalgia, insomnia, and primary peritoneal carcinomatosis presents to Palliative Medicine for symptom management and ACP. Please see oncology notes for full details of oncologic history and treatment course    08/19/2024:  LA  reviewed and summarized:  07/09/2024 Hydrocodone-apap 5-325 mg Disp: 28 for 7 days  07/03/2024 Alprazolam 0.5 mg Disp: 180 for 90 days     History of Present Illness    CHIEF COMPLAINT:  - Bone fractures  - Irritable bowel syndrome  - Sleep issues    HISTORY OBTAINED FROM:  - Patient    HPI:  The patient reports recently breaking more bones in her back, near the bra strap area. She has been seeing Gayle, a bone specialist, for treatment and pain management. Gayle has advised her on weight-lifting exercises and limitations to maintain bone strength. The patient is due to see Gayle again in October.    The patient's irritable bowel syndrome (IBS) has been causing significant trouble, including bloating. She has tried various medications including amitriptyline and OTC antacids without success. The patient expresses concern about whether her cancer treatments over the past 11 years might be contributing to her digestive issues.    Sleep issues continue to be a problem for the patient. She reports being awake most nights, engaging in activities like cleaning, watching TV, and even cooking due to inability to sleep. Her mind races with various thoughts, contributing to her restlessness.    The patient also reports ongoing bladder issues, having been diagnosed with a small, weak bladder for many years. She is currently taking  "Ditropan as prescribed by Dr. Luque, which helps sometimes but she still experiences urinary leakage.    The patient mentions having osteoporosis, describing her bones as being "like tissue paper." She tries to stay active within her limitations, including walking in her hallway and using light weights. She avoids lifting anything over 25-30 lbs or reaching above her head due to pain.    The patient denies feeling depressed but acknowledges experiencing anxiety and stress.    GOALS OF CARE/ADVANCED DIRECTIVES:  - Desire for a simple, peaceful life  - Decided against further cancer treatments  - Chooses not to pursue further diagnostic tests for cancer  - Understanding of prognosis and limitations  - Lives with 81-year-old brother  - Daughter is potential source of transportation  - Values independence, doing housework, cooking, shopping, and visiting with friends    ACTIVITIES OF DAILY LIVING:  - Able to take care of herself  - Visits with friends  - Plan to attend [employer]  - Exercises with weights as tolerated  - Avoids lifting more than 25-30 lbs and lifting items over her head due to bone fragility  - History of broken bones  - Experiences bladder issues  - Difficulty sleeping  - Able to walk around inside her home  - Lives with her brother    SOCIAL HISTORY:  - Att[employer]  - Plan to go to [employer] more regularly  - Considers [employer] members as her "[employer] family"  - Participates in monthly [employer] meal gatherings         Please see previous notes for full details of encounter on 05/04/2023; 05/29/2023; 06/15/2023; 08/31/2023; 03/04/2024;     Past Medical History:   Diagnosis Date    Arthritis     Back pain     BRCA2 gene mutation positive in female 04/24/2018    Cataract     BILATERAL    Elevated cancer antigen 125 (CA-125) 05/18/2016    Fibromyalgia     Gastritis     History of chemotherapy     Insomnia due to medical condition 06/29/2021    Neuromuscular disorder     Osteopenia     Primary insomnia 01/03/2020    " Primary peritoneal carcinomatosis 7/26/2013 AND 2017    Stage IIIC subopitimally debulked.     Well woman exam with routine gynecological exam 02/10/2015     Past Surgical History:   Procedure Laterality Date    APPENDECTOMY      BREAST BIOPSY      CHOLECYSTECTOMY      CYST REMOVAL      left ear, right breast    FIXATION KYPHOPLASTY N/A 7/19/2023    Procedure: KYPHOPLASTY T12 & L4 with BX;  Surgeon: Sudeep Arevalo MD;  Location: [facility];  Service: Orthopedics;  Laterality: N/A;    HYSTERECTOMY      vaginal/prolapse    ID REMOVAL OF OVARY/TUBE(S)      SALPINGOOPHORECTOMY  7/25/13    laparotomy     TONSILLECTOMY       Family History   Problem Relation Name Age of Onset    Cancer Mother          gastric cancer    Heart disease Father  69        MI    Cancer Paternal Aunt          gastric cancer    Ovarian cancer Neg Hx      Uterine cancer Neg Hx      Breast cancer Neg Hx      Colon cancer Neg Hx       Review of patient's allergies indicates:   Allergen Reactions    Carboplatin Hives and Shortness Of Breath    Diflucan [fluconazole] Rash       Medications:    Current Outpatient Medications:     ALPRAZolam (XANAX) 0.5 MG tablet, Take 1 tablet by mouth twice daily as needed for anxiety, Disp: 180 tablet, Rfl: 0    cetirizine (ZYRTEC) 10 MG tablet, Take 1 tablet (10 mg total) by mouth once daily., Disp: 90 tablet, Rfl: 0    gabapentin (NEURONTIN) 600 MG tablet, Take 1 tablet (600 mg total) by mouth 2 (two) times daily., Disp: 180 tablet, Rfl: 0    LIDOcaine-prilocaine (EMLA) cream, Apply topically as needed., Disp: 30 g, Rfl: 1    mirtazapine (REMERON) 15 MG tablet, Take 1 tablet (15 mg total) by mouth every evening., Disp: 90 tablet, Rfl: 2    ondansetron (ZOFRAN-ODT) 8 MG TbDL, Take 1 tablet (8 mg total) by mouth every 8 (eight) hours as needed (chemotherapy induced nausea). (Patient not taking: Reported on 8/31/2023), Disp: 60 tablet, Rfl: 0    oxybutynin (DITROPAN) 5 MG Tab, Take 1 tablet (5 mg total) by mouth 2  (two) times daily., Disp: 180 tablet, Rfl: 3    rOPINIRole (REQUIP) 4 MG tablet, Take 1 tablet (4 mg total) by mouth nightly., Disp: 90 tablet, Rfl: 3  No current facility-administered medications for this visit.    Facility-Administered Medications Ordered in Other Visits:     LIDOcaine (PF) 10 mg/ml (1%) injection 10 mg, 1 mL, Intradermal, Once, Ward Abraham MD    phenylephrine-ketorolac (OMIDRIA) 1-0.3 % 500 mL irrigation, 1 each, Ocular Irrigation, Once, Ward Abraham MD    OBJECTIVE:       ROS:  Review of Systems   Constitutional:  Positive for activity change, appetite change and fatigue.   HENT: Negative.     Eyes: Negative.    Respiratory: Negative.     Cardiovascular: Negative.    Gastrointestinal:  Positive for constipation and diarrhea. Negative for abdominal pain.   Genitourinary: Negative.    Musculoskeletal:  Positive for arthralgias, back pain and myalgias.        + neuropathic pain   Skin: Negative.    Neurological: Negative.    Psychiatric/Behavioral:  Positive for sleep disturbance. Negative for dysphoric mood. The patient is nervous/anxious.    All other systems reviewed and are negative.      Review of Symptoms      Symptom Assessment (ESAS 0-10 Scale)  Pain:  4  Dyspnea:  0  Anxiety:  5  Nausea:  0  Depression:  0  Anorexia:  0  Fatigue:  6  Insomnia:  10  Restlessness:  0  Agitation:  0     CAM / Delirium:  Negative  Constipation:  Positive  Diarrhea:  Positive    Anxiety:  Is nervous/anxious  Constipation:  Constipation    Bowel Management Plan (BMP):  No      Pain Assessment:  OME in 24 hours:  0  Location(s): generalized            LEG Chronicity Choices: greater than.  Generalized       Location: generalized        Quality: Aching        Quantity: 4/10 in intensity        Chronicity: Onset 1 year(s) ago, gradually worsening        Aggravating Factors: Activity        Alleviating Factors: Acetaminophen and NSAIDs        Associated Symptoms: None    Modified Chuck Scale:   0    ECOG Performance Status ndGndrndanddndend:nd nd2nd Living Arrangements:  Lives with family    Psychosocial/Cultural:   See Palliative Psychosocial Note: Yes  Patient lives with her brother. Patient enjoys being active - shopping, visiting/helping people; gardening; reading the bible  **Primary  to Follow**  Palliative Care  Consult: No    Spiritual:  F - Carli and Belief:  Yes  I - Importance:  Very  C - Community:  Yes  A - Address in Care:  Needs met      Advance Care Planning   Advance Directives:   Living Will: No    LaPOST: No    Do Not Resuscitate Status: No    Medical Power of : No        Oral Declaration: Yes  Agent's Name:  Randee Robison   Agent's Contact Number:  [phone]    Decision Making:  Patient answered questions  Goals of Care: Advance Care Planning    Date: 08/19/2024    [facility]  I engaged the patient in a voluntary conversation about advance care planning and we specifically addressed what the goals of care would be moving forward, in light of the patient's change in clinical status, specifically primary peritoneal adenocarcinoma.  We did specifically address the patient's likely prognosis, which is fair .  We explored the patient's values and preferences for future care.  The patient endorses that what is most important right now is to focus on avoiding the hospital, remaining as independent as possible, symptom/pain control, and improvement in condition but with limits to invasive therapies    Accordingly, we have decided that the best plan to meet the patient's goals includes no further escalation in treatment          Physical Exam: limited due to telemedicine visit  Vitals:    Physical Exam  Constitutional:       General: She is not in acute distress.     Appearance: She is not toxic-appearing.   HENT:      Head: Normocephalic and atraumatic.      Right Ear: External ear normal.      Left Ear: External ear normal.      Nose: Nose normal.      Mouth/Throat:      Mouth:  "Mucous membranes are moist.   Eyes:      General: No scleral icterus.        Right eye: No discharge.         Left eye: No discharge.   Neck:      Comments: Trachea midline  Pulmonary:      Effort: Pulmonary effort is normal. No respiratory distress.   Musculoskeletal:      Cervical back: Normal range of motion.      Comments: Sitting up in chair; moves upper extremities without difficulty   Skin:     Coloration: Skin is not jaundiced or pale.      Findings: No rash.   Neurological:      General: No focal deficit present.      Mental Status: She is alert and oriented to person, place, and time.      Cranial Nerves: No cranial nerve deficit.   Psychiatric:         Behavior: Behavior normal.         Thought Content: Thought content normal.         Judgment: Judgment normal.         Labs:  CBC:   WBC   Date Value Ref Range Status   07/20/2023 7.20 3.90 - 12.70 K/uL Final     Hemoglobin   Date Value Ref Range Status   07/20/2023 11.3 (L) 12.0 - 16.0 g/dL Final     Hematocrit   Date Value Ref Range Status   07/20/2023 33.9 (L) 37.0 - 48.5 % Final     MCV   Date Value Ref Range Status   07/20/2023 95 82 - 98 fL Final     Platelets   Date Value Ref Range Status   07/20/2023 242 150 - 450 K/uL Final       LFT:   Lab Results   Component Value Date    AST 41 (H) 07/19/2023    ALKPHOS 113 07/19/2023    BILITOT 0.5 07/19/2023       Albumin:   Albumin   Date Value Ref Range Status   07/19/2023 4.4 3.5 - 5.2 g/dL Final     Protein:   Total Protein   Date Value Ref Range Status   07/19/2023 8.1 6.3 - 8.2 g/dL Final       Radiology:I have reviewed all pertinent imaging results/findings within the past 24 hours.    10/26/2023 MRI Thoracic spine: "1. Abnormal marrow signal intensity involving the T7 and T8 vertebral bodies extending into the pedicles, suspicious for metastatic disease in this patient with history of ovarian cancer. 2. Multilevel compression fractures."    10/26/2023 MRI lumbar spine: "1. Old compression fracture at " "the edge of the film at T11. 2. Since the previous study there is been a kyphoplasty of the fracture known on the previous study at T12 3. Old compression fracture of L1 with a central disc protrusion at L1-2 with facet changes and moderate left and mild right neural foramina narrowing and mild spinal stenosis 4. Old mild compression fracture of the superior anterior aspect of L3 unchanged and new kyphoplasty of the known compression fracture of L4 since the previous study. 5. Small central disc bulges throughout contributing to mild to moderate narrowing of the neural foramina"    I spent a total of 45 minutes on the day of the visit.This includes face to face time in discussion of symptom assessment, coordination of care and emotional support.  This also includes non-face to face time preparing to see the patient (eg, review of tests/imaging), obtaining and/or reviewing separately obtained history, documenting clinical information in the electronic or other health record, independently interpreting results and communicating results to the patient/family/caregiver, or care coordinator.     This note was generated with the assistance of ambient listening technology. Verbal consent was obtained by the patient and accompanying visitor(s) for the recording of patient appointment to facilitate this note. I attest to having reviewed and edited the generated note for accuracy, though some syntax or spelling errors may persist. Please contact the author of this note for any clarification.    Signature: Joyce Franco MD    Advance Care Planning     Date: 03/04/2024    Power of   I initiated the process of voluntary advance care planning today and explained the importance of this process to the patient.  I introduced the concept of advance directives to the patient, as well. Then the patient received detailed information about the importance of designating a Health Care Power of  (HCPOA). She was also " instructed to communicate with this person about their wishes for future healthcare, should she become sick and lose decision-making capacity. The patient has not previously appointed a HCPOA. After our discussion, the patient has decided to complete a HCPOA and has appointed her daughter, health care agent:  Randee Robison  & health care agent number:  [phone]  I spent a total time of 16 minutes discussing this issue with the patient.

## 2024-09-17 ENCOUNTER — E-VISIT (OUTPATIENT)
Dept: GYNECOLOGIC ONCOLOGY | Facility: CLINIC | Age: 74
End: 2024-09-17
Payer: MEDICARE

## 2024-09-17 DIAGNOSIS — M89.8X9 BONE PAIN: Primary | ICD-10-CM

## 2024-09-18 DIAGNOSIS — G89.3 CANCER RELATED PAIN: ICD-10-CM

## 2024-09-18 DIAGNOSIS — C48.2 PRIMARY PERITONEAL ADENOCARCINOMA: ICD-10-CM

## 2024-09-18 PROBLEM — M89.8X9 BONE PAIN: Status: ACTIVE | Noted: 2024-09-18

## 2024-09-18 NOTE — TELEPHONE ENCOUNTER
Patient called to inquire about pain meds specifically narcotics. This nurse explained to her that based on her previous virtual visit with Dr. Franco that Dr. Franco can't prescribe any pain medications unless she is seen in clinic per our protocol. Patient states that she can't come in clinic due to transportation issues. Nurse informed her that she understands but we can reach out to KIANA Luque MD and her  to schedule transportation- the patient refuses. Patient asked if there is something that isn't narcotic that she can receive. Patient has gabapentin 600 mg that nurse can fill. KIANA Luque MD notified and RAUL Barajas RN aware. No new orders.

## 2024-09-19 DIAGNOSIS — C48.2 PRIMARY PERITONEAL ADENOCARCINOMA: ICD-10-CM

## 2024-09-19 DIAGNOSIS — G89.3 CANCER RELATED PAIN: ICD-10-CM

## 2024-09-19 RX ORDER — GABAPENTIN 600 MG/1
600 TABLET ORAL 2 TIMES DAILY
Qty: 180 TABLET | Refills: 0 | OUTPATIENT
Start: 2024-09-19 | End: 2024-12-18

## 2024-09-19 RX ORDER — GABAPENTIN 600 MG/1
600 TABLET ORAL 2 TIMES DAILY
Qty: 180 TABLET | Refills: 0 | Status: SHIPPED | OUTPATIENT
Start: 2024-09-19

## 2024-09-23 ENCOUNTER — TELEPHONE (OUTPATIENT)
Dept: PALLIATIVE MEDICINE | Facility: CLINIC | Age: 74
End: 2024-09-23
Payer: MEDICARE

## 2024-09-23 ENCOUNTER — PATIENT MESSAGE (OUTPATIENT)
Dept: PALLIATIVE MEDICINE | Facility: CLINIC | Age: 74
End: 2024-09-23
Payer: MEDICARE

## 2024-10-03 DIAGNOSIS — C56.9 MALIGNANT NEOPLASM OF OVARY, UNSPECIFIED LATERALITY: ICD-10-CM

## 2024-10-03 RX ORDER — ALPRAZOLAM 0.5 MG/1
0.5 TABLET ORAL 2 TIMES DAILY
Qty: 60 TABLET | Refills: 0 | Status: SHIPPED | OUTPATIENT
Start: 2024-10-03

## 2024-10-03 RX ORDER — ALPRAZOLAM 0.5 MG/1
0.5 TABLET ORAL 2 TIMES DAILY
Qty: 180 TABLET | Refills: 0 | Status: CANCELLED | OUTPATIENT
Start: 2024-10-03 | End: 2025-01-01

## 2024-10-04 ENCOUNTER — PATIENT MESSAGE (OUTPATIENT)
Dept: GYNECOLOGIC ONCOLOGY | Facility: CLINIC | Age: 74
End: 2024-10-04
Payer: MEDICARE

## 2024-10-31 DIAGNOSIS — C56.9 MALIGNANT NEOPLASM OF OVARY, UNSPECIFIED LATERALITY: ICD-10-CM

## 2024-10-31 RX ORDER — ALPRAZOLAM 0.5 MG/1
0.5 TABLET ORAL 2 TIMES DAILY
Qty: 60 TABLET | Refills: 0 | Status: SHIPPED | OUTPATIENT
Start: 2024-10-31

## 2024-11-15 ENCOUNTER — TELEPHONE (OUTPATIENT)
Dept: PALLIATIVE MEDICINE | Facility: CLINIC | Age: 74
End: 2024-11-15
Payer: MEDICARE

## 2024-12-02 ENCOUNTER — OFFICE VISIT (OUTPATIENT)
Dept: PALLIATIVE MEDICINE | Facility: CLINIC | Age: 74
End: 2024-12-02
Payer: MEDICARE

## 2024-12-02 DIAGNOSIS — Z71.89 ADVANCED CARE PLANNING/COUNSELING DISCUSSION: ICD-10-CM

## 2024-12-02 DIAGNOSIS — F43.23 ADJUSTMENT DISORDER WITH MIXED ANXIETY AND DEPRESSED MOOD: ICD-10-CM

## 2024-12-02 DIAGNOSIS — G47.00 INSOMNIA, UNSPECIFIED TYPE: ICD-10-CM

## 2024-12-02 DIAGNOSIS — M79.7 FIBROMYALGIA: ICD-10-CM

## 2024-12-02 DIAGNOSIS — R53.0 NEOPLASTIC (MALIGNANT) RELATED FATIGUE: ICD-10-CM

## 2024-12-02 DIAGNOSIS — Z51.5 ENCOUNTER FOR PALLIATIVE CARE: ICD-10-CM

## 2024-12-02 DIAGNOSIS — M80.00XA OSTEOPOROSIS WITH CURRENT PATHOLOGICAL FRACTURE, UNSPECIFIED OSTEOPOROSIS TYPE, INITIAL ENCOUNTER: ICD-10-CM

## 2024-12-02 DIAGNOSIS — M54.42 ACUTE BILATERAL LOW BACK PAIN WITH BILATERAL SCIATICA: ICD-10-CM

## 2024-12-02 DIAGNOSIS — G89.3 CANCER RELATED PAIN: ICD-10-CM

## 2024-12-02 DIAGNOSIS — K58.2 IRRITABLE BOWEL SYNDROME WITH BOTH CONSTIPATION AND DIARRHEA: ICD-10-CM

## 2024-12-02 DIAGNOSIS — M54.41 ACUTE BILATERAL LOW BACK PAIN WITH BILATERAL SCIATICA: ICD-10-CM

## 2024-12-02 DIAGNOSIS — C48.2 PRIMARY PERITONEAL ADENOCARCINOMA: Primary | ICD-10-CM

## 2024-12-02 PROCEDURE — 99215 OFFICE O/P EST HI 40 MIN: CPT | Mod: 95,,, | Performed by: STUDENT IN AN ORGANIZED HEALTH CARE EDUCATION/TRAINING PROGRAM

## 2024-12-02 RX ORDER — CELECOXIB 200 MG/1
200 CAPSULE ORAL DAILY
Qty: 90 CAPSULE | Refills: 2 | Status: SHIPPED | OUTPATIENT
Start: 2024-12-02

## 2024-12-02 RX ORDER — FLUOXETINE HYDROCHLORIDE 20 MG/1
20 CAPSULE ORAL DAILY
Qty: 90 CAPSULE | Refills: 2 | Status: SHIPPED | OUTPATIENT
Start: 2024-12-02

## 2024-12-02 NOTE — PROGRESS NOTES
Consult Note  Palliative Care    The patient location is: home  The chief complaint leading to consultation is: symptom management and ACP    Visit type: audiovisual    Face to Face time with patient: 37 minutes    44 minutes of total time spent on the encounter, which includes face to face time and non-face to face time preparing to see the patient (eg, review of tests), Obtaining and/or reviewing separately obtained history, Documenting clinical information in the electronic or other health record, Independently interpreting results (not separately reported) and communicating results to the patient/family/caregiver, or Care coordination (not separately reported).     Each patient to whom he or she provides medical services by telemedicine is:  (1) informed of the relationship between the physician and patient and the respective role of any other health care provider with respect to management of the patient; and (2) notified that he or she may decline to receive medical services by telemedicine and may withdraw from such care at any time.    Reason for Consult: symptom management and ACP      ASSESSMENT/PLAN:     Plan/Recommendations:  Diagnoses and all orders for this visit:    Primary peritoneal adenocarcinoma  - followed by Dr. Luque; has not seen in long time  - initial diagnosis in 2013; please see oncology notes for full details of treatment  - has elected to stop disease directed therapy at this time; last chemotherapy treatment in March 2023  - at previous visit per patient she feels that she does not have cancer anymore. She reports not being in denial, but that she does not feel sick and chooses to believe that she does not have cancer anymore.   - discussed with patient today as well as at previous visits about if she were to follow up with Dr. Luque for testing/imaging to see state of the cancer and would that change anything for her decision wise. Patient states no, she would still not purse any cancer  directed therapies.     Encounter for palliative care/Advanced care planning  Advance Care Planning   - patient decisional  - patient by herself today on telemedicine visit  - no ACP documents uploaded into EMR  - philosophy of Palliative Medicine and new patient folder information sent in mail to patient after first visit. Patient reports never receiving the packet in the mail. Will ask Pall Med MA to mail new patient folder and ACP packet to Ms. Haase today. Will also try to upload into patient portal for patient to review  - patient verbally named her daughter Randee Robison as her HCPOA  - previously introduced philosophy of hospice care including services provided included but not limited to nursing visits, nursing aides, dme, medications, sw/, and 24 hour number. All questions/concerns were addressed.   - patient is not interested in enrolling in hospice at this time  - goals: improved quality of life and remaining independent. Patient states that she wants to prolong her life but limits to invasive therapies     Cancer related pain/Irritable bowel syndrome with both constipation and diarrhea/Fibromyalgia/Osteoporosis/fractures  - patient reporting moderate pain in her bones and muscles. Patient states she has increased pain with taking care of her brother, who had a recent fall  - patient reports that she still is experiencing pain, especially in her back, neck, and shoulders  - continue gabapentin 600 mg TID; this provides moderate relief  - continue pain medication per another provider. Discussed in detail today as well as previously that this clinic is NOT able to prescribe controlled substances to her (eg. Pain medication, certain sleep aides, etc) as patient continues to decline to come to [place] to be seen in person due to transportation challenges.   - will work on trying to find another provider closer to her for in person evaluation and management  - patient stopped PT on her own  - patient  "uses OTC APAP and ibuprofen with some relief. Discussed that she should follow instructions on the bottle and not overuse due to side effects that can occur including liver damage, stomach issues, and kidney problems. Will stop OTC medication today  - start celecoxib 200 mg daily today  - patient following with orthopedist for osteoporosis and broken bones. She has elected to not pursue treatment of osteoporosis or surgery at this time.   - patient still having fluctuation in bowel movements so her abdomen will also become more bloated at times or bother her. Encouraged to follow up with GI specialist for management of IBS  - will continue to monitor    Adjustment disorder with mixed anxiety and depressed mood  - patient reporting moderate to severe anxiety today; she states that she is feeling more anxious around finances as well as feeling more on edge with taking care of her brother. 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 She feels that she is now on call 24 hours a day to help her brother, which is taking a toll on her health.   - she states she is usually trying to stay in a positive frame of mind. She does have increased thoughts/stress at night, which affects her sleep  - previously patient states that she wants peace and and doesn't want to ask God "why". She states it is harder to deal with all aspects of her cancer as this is the 4th time she is dealing with the disease  - emotional support provided today  - patient previously followed with EDOUARD Alarcon  - per Dr. Alarcon's note: patient has weaned herself off of zoloft  - patient did not find benefit from mirtazapine 15 mg qhs for sleep/mood  - initially found benefit with fluoxetine 10 mg but does not find it as effective now. Will increase dose to 20 mg daily  - continue xanax 0.25 mg as prescribed by another provider; defer all refills and adjustments to prescribing physician    Neoplastic (malignant) related " "fatigue/Insomnia  - patient with worsening insomnia and overall fatigue  - patient trying to stay active but she is sometimes unable to complete all activities that she wants to. She spoke of wanting to feel better and more like she did previously  - patient reports that she has some racing thoughts at night as well as having pain that keeps her up  - ambien not helpful; doxepin not helpful; mirtazapine 15 mg qhs not helpful  - continue increased fluoxetine to help with anxiety which may help with insomnia as well  - discussed body scan as well as writing down her worries that are keeping her up at night    Understanding of illness/Prognosis: patient with good understanding of illness; prognosis is guarded    Goals of care: life prolonging    Follow up: ~ 12 weeks    SUBJECTIVE:     History of Present Illness:  Patient is a 74 y.o. year old female with fibromyalgia, insomnia, and primary peritoneal carcinomatosis presents to Palliative Medicine for symptom management and ACP. Please see oncology notes for full details of oncologic history and treatment course    12/02/2024:  LA  reviewed and summarized:  11/01/2024 Alprazolam 0.5 mg Disp: 60 for 30 days    History of Present Illness    CHIEF COMPLAINT:  - Patient presents virtually today for follow-up on pain management, sleep issues, and medication adjustment related to caring for her brother.    HISTORY OBTAINED FROM:  - Patient    HPI:  Patient reports worsening pain and exhaustion due to taking care of her brother who recently fell. She has been unable to sleep properly as she is constantly checking her phone for calls or texts from her brother asking for help, even though they live together. Patient mentions having ongoing bone issues exacerbated by the physical demands of caring for her brother. She expresses feeling mental and emotional strain from being "on call 24-7." Patient was previously prescribed fluoxetine, which initially provided some benefit in " "helping her calm down, but the effect has diminished over time. She reports that the medication is not effectively helping her sleep. Patient also mentions a history of chronic issues, including sciatic nerve problems, which have made her daily activities challenging.    Patient denies finding benefit from her current dose of fluoxetine for sleep or anxiety management.    GOALS OF CARE/ADVANCED DIRECTIVES:  - Expresses a clear desire not to pursue further aggressive treatments  - Demonstrates understanding of her condition and prognosis, acknowledging that her previous 13-year health journey has permanently changed her  - Goal is to focus on feeling better and living a life she enjoys, rather than pursuing additional diagnostic tests or treatments  - Shows a preference for quality of life over quantity  - Currently lives with her brother and assists in his care, despite her own health challenges  - Expresses a desire to maintain her current living situation and continue doing what she's been doing, indicating a preference for stability and familiar surroundings    ACTIVITIES OF DAILY LIVING:  - Currently taking care of her brother who recently fell, indicating some level of physical capability to assist others  - Difficulty sleeping due to constantly checking her phone for calls or texts from her brother, even when they are together  - Reports being exhausted, which may impact her ability to perform daily activities  - Mentions having "bone issues" which may affect mobility and ability to perform certain tasks          Please see previous notes for full details of encounter on 05/04/2023; 05/29/2023; 06/15/2023; 08/31/2023; 03/04/2024; 08/19/2024;     Past Medical History:   Diagnosis Date    Arthritis     Back pain     BRCA2 gene mutation positive in female 04/24/2018    Cataract     BILATERAL    Elevated cancer antigen 125 (CA-125) 05/18/2016    Fibromyalgia     Gastritis     History of chemotherapy     Insomnia " due to medical condition 06/29/2021    Neuromuscular disorder     Osteopenia     Primary insomnia 01/03/2020    Primary peritoneal carcinomatosis 7/26/2013 AND 2017    Stage IIIC subopitimally debulked.     Well woman exam with routine gynecological exam 02/10/2015     Past Surgical History:   Procedure Laterality Date    APPENDECTOMY      BREAST BIOPSY      CHOLECYSTECTOMY      CYST REMOVAL      left ear, right breast    FIXATION KYPHOPLASTY N/A 7/19/2023    Procedure: KYPHOPLASTY T12 & L4 with BX;  Surgeon: Sudeep Arevalo MD;  Location: [facility] OR;  Service: Orthopedics;  Laterality: N/A;    HYSTERECTOMY      vaginal/prolapse    TN REMOVAL OF OVARY/TUBE(S)      SALPINGOOPHORECTOMY  7/25/13    laparotomy     TONSILLECTOMY       Family History   Problem Relation Name Age of Onset    Cancer Mother          gastric cancer    Heart disease Father  69        MI    Cancer Paternal Aunt          gastric cancer    Ovarian cancer Neg Hx      Uterine cancer Neg Hx      Breast cancer Neg Hx      Colon cancer Neg Hx       Review of patient's allergies indicates:   Allergen Reactions    Carboplatin Hives and Shortness Of Breath    Diflucan [fluconazole] Rash       Medications:    Current Outpatient Medications:     ALPRAZolam (XANAX) 0.5 MG tablet, Take 1 tablet by mouth twice daily as needed for anxiety, Disp: 60 tablet, Rfl: 0    cetirizine (ZYRTEC) 10 MG tablet, Take 1 tablet (10 mg total) by mouth once daily., Disp: 90 tablet, Rfl: 0    FLUoxetine 10 MG capsule, Take 1 capsule by mouth once daily, Disp: 90 capsule, Rfl: 0    gabapentin (NEURONTIN) 600 MG tablet, Take 1 tablet (600 mg total) by mouth 2 (two) times daily., Disp: 180 tablet, Rfl: 0    LIDOcaine-prilocaine (EMLA) cream, Apply topically as needed., Disp: 30 g, Rfl: 1    oxybutynin (DITROPAN) 5 MG Tab, Take 1 tablet (5 mg total) by mouth 2 (two) times daily., Disp: 180 tablet, Rfl: 3    rOPINIRole (REQUIP) 4 MG tablet, Take 1 tablet (4 mg total) by mouth nightly.,  Disp: 90 tablet, Rfl: 3  No current facility-administered medications for this visit.    Facility-Administered Medications Ordered in Other Visits:     LIDOcaine (PF) 10 mg/ml (1%) injection 10 mg, 1 mL, Intradermal, Once, Ward Abraham MD    phenylephrine-ketorolac (OMIDRIA) 1-0.3 % 500 mL irrigation, 1 each, Ocular Irrigation, Once, Ward Abraham MD    OBJECTIVE:       ROS:  Review of Systems   Constitutional:  Positive for activity change and fatigue. Negative for appetite change.   HENT: Negative.     Eyes: Negative.    Respiratory: Negative.     Cardiovascular: Negative.    Gastrointestinal:  Positive for constipation and diarrhea. Negative for abdominal pain.   Genitourinary: Negative.    Musculoskeletal:  Positive for arthralgias, back pain and myalgias.        + neuropathic pain   Skin: Negative.    Neurological: Negative.    Psychiatric/Behavioral:  Positive for sleep disturbance. Negative for dysphoric mood. The patient is nervous/anxious.    All other systems reviewed and are negative.      Review of Symptoms      Symptom Assessment (ESAS 0-10 Scale)  Pain:  6  Dyspnea:  0  Anxiety:  7  Nausea:  0  Depression:  0  Anorexia:  0  Fatigue:  8  Insomnia:  8  Restlessness:  0  Agitation:  0     CAM / Delirium:  Negative  Constipation:  Positive  Diarrhea:  Positive    Anxiety:  Is nervous/anxious  Constipation:  Constipation    Bowel Management Plan (BMP):  No      Pain Assessment:  OME in 24 hours:  0  Location(s): generalized    Generalized       Location: generalized        Quality: Aching        Quantity: 6/10 in intensity        Chronicity: Onset 1 (greater than) year(s) ago, gradually worsening        Aggravating Factors: Activity        Alleviating Factors: Acetaminophen and NSAIDs        Associated Symptoms: None    Modified Chuck Scale:  0    ECOG Performance Status ndGndrndanddndend:nd nd2nd Living Arrangements:  Lives with family    Psychosocial/Cultural:   See Palliative Psychosocial Note:  Yes  Patient lives with her brother. Patient enjoys being active - shopping, visiting/helping people; gardening; reading the bible  **Primary  to Follow**  Palliative Care  Consult: No    Spiritual:  F - Carli and Belief:  Yes  I - Importance:  Very  C - Community:  Yes  A - Address in Care:  Needs met      Advance Care Planning   Advance Directives:   Living Will: No    LaPOST: No    Do Not Resuscitate Status: No    Medical Power of : No        Oral Declaration: Yes  Agent's Name:  Randee Robison   Agent's Contact Number:  [phone]    Decision Making:  Patient answered questions  Goals of Care: Advance Care Planning    Date: 12/02/2024    [facility]  I engaged the patient in a voluntary conversation about advance care planning and we specifically addressed what the goals of care would be moving forward, in light of the patient's change in clinical status, specifically peritoneal carcinoma.  We did specifically address the patient's likely prognosis, which is poor.  We explored the patient's values and preferences for future care.  The patient endorses that what is most important right now is to focus on remaining as independent as possible, symptom/pain control, and comfort and QOL     Accordingly, we have decided that the best plan to meet the patient's goals includes no further escalation in treatment          Physical Exam: limited due to telemedicine visit  Vitals: BP:  (virtual) (12/02/24 0911)  Physical Exam  Constitutional:       General: She is not in acute distress.     Appearance: She is not toxic-appearing.   HENT:      Head: Normocephalic and atraumatic.      Right Ear: External ear normal.      Left Ear: External ear normal.      Nose: Nose normal.      Mouth/Throat:      Mouth: Mucous membranes are moist.   Eyes:      General: No scleral icterus.        Right eye: No discharge.         Left eye: No discharge.   Neck:      Comments: Trachea midline  Pulmonary:      Effort:  "Pulmonary effort is normal. No respiratory distress.   Musculoskeletal:      Cervical back: Normal range of motion.      Comments: Sitting up in chair; moves upper extremities without difficulty   Skin:     Coloration: Skin is not jaundiced or pale.      Findings: No rash.   Neurological:      General: No focal deficit present.      Mental Status: She is alert and oriented to person, place, and time.      Cranial Nerves: No cranial nerve deficit.   Psychiatric:         Behavior: Behavior normal.         Thought Content: Thought content normal.         Judgment: Judgment normal.         Labs:  CBC:   WBC   Date Value Ref Range Status   07/20/2023 7.20 3.90 - 12.70 K/uL Final     Hemoglobin   Date Value Ref Range Status   07/20/2023 11.3 (L) 12.0 - 16.0 g/dL Final     Hematocrit   Date Value Ref Range Status   07/20/2023 33.9 (L) 37.0 - 48.5 % Final     MCV   Date Value Ref Range Status   07/20/2023 95 82 - 98 fL Final     Platelets   Date Value Ref Range Status   07/20/2023 242 150 - 450 K/uL Final       LFT:   Lab Results   Component Value Date    AST 41 (H) 07/19/2023    ALKPHOS 113 07/19/2023    BILITOT 0.5 07/19/2023       Albumin:   Albumin   Date Value Ref Range Status   07/19/2023 4.4 3.5 - 5.2 g/dL Final     Protein:   Total Protein   Date Value Ref Range Status   07/19/2023 8.1 6.3 - 8.2 g/dL Final       Radiology:I have reviewed all pertinent imaging results/findings within the past 24 hours.    10/26/2023 MRI Thoracic spine: "1. Abnormal marrow signal intensity involving the T7 and T8 vertebral bodies extending into the pedicles, suspicious for metastatic disease in this patient with history of ovarian cancer. 2. Multilevel compression fractures."    10/26/2023 MRI lumbar spine: "1. Old compression fracture at the edge of the film at T11. 2. Since the previous study there is been a kyphoplasty of the fracture known on the previous study at T12 3. Old compression fracture of L1 with a central disc " "protrusion at L1-2 with facet changes and moderate left and mild right neural foramina narrowing and mild spinal stenosis 4. Old mild compression fracture of the superior anterior aspect of L3 unchanged and new kyphoplasty of the known compression fracture of L4 since the previous study. 5. Small central disc bulges throughout contributing to mild to moderate narrowing of the neural foramina"    I spent a total of 44 minutes on the day of the visit.This includes face to face time in discussion of symptom assessment, coordination of care and emotional support.  This also includes non-face to face time preparing to see the patient (eg, review of tests/imaging), obtaining and/or reviewing separately obtained history, documenting clinical information in the electronic or other health record, independently interpreting results and communicating results to the patient/family/caregiver, or care coordinator.     This note was generated with the assistance of ambient listening technology. Verbal consent was obtained by the patient and accompanying visitor(s) for the recording of patient appointment to facilitate this note. I attest to having reviewed and edited the generated note for accuracy, though some syntax or spelling errors may persist. Please contact the author of this note for any clarification.    Signature: Joyce Franco MD    Advance Care Planning     Date: 03/04/2024    Power of   I initiated the process of voluntary advance care planning today and explained the importance of this process to the patient.  I introduced the concept of advance directives to the patient, as well. Then the patient received detailed information about the importance of designating a Health Care Power of  (HCPOA). She was also instructed to communicate with this person about their wishes for future healthcare, should she become sick and lose decision-making capacity. The patient has not previously appointed a " HCPOA. After our discussion, the patient has decided to complete a HCPOA and has appointed her daughter, health care agent:  Randee Robison  & health care agent number:  [phone]  I spent a total time of 16 minutes discussing this issue with the patient.

## 2024-12-03 ENCOUNTER — TELEPHONE (OUTPATIENT)
Dept: GYNECOLOGIC ONCOLOGY | Facility: CLINIC | Age: 74
End: 2024-12-03
Payer: MEDICARE

## 2024-12-03 DIAGNOSIS — C56.9 MALIGNANT NEOPLASM OF OVARY, UNSPECIFIED LATERALITY: ICD-10-CM

## 2024-12-03 RX ORDER — ALPRAZOLAM 0.5 MG/1
0.5 TABLET ORAL 2 TIMES DAILY
Qty: 60 TABLET | Refills: 0 | Status: SHIPPED | OUTPATIENT
Start: 2024-12-03

## 2024-12-03 NOTE — TELEPHONE ENCOUNTER
----- Message from Aracelis sent at 12/3/2024  2:21 PM CST -----  Regarding: refill  Type: RX Refill Request     Who Called:pt      RX Name and Strength:ALPRAZolam (XANAX) 0.5 MG tablet      Preferred Pharmacy with phone number:[facility] [address] [place] [address]   Phone: [phone]           Would the patient rather a call back or a response via [facility]?call     Best Call Back Number:[phone]      Additional Information:

## 2024-12-31 DIAGNOSIS — C56.9 MALIGNANT NEOPLASM OF OVARY, UNSPECIFIED LATERALITY: ICD-10-CM

## 2024-12-31 RX ORDER — ALPRAZOLAM 0.5 MG/1
0.5 TABLET ORAL 2 TIMES DAILY
Qty: 60 TABLET | Refills: 0 | Status: SHIPPED | OUTPATIENT
Start: 2024-12-31

## 2025-01-30 DIAGNOSIS — C56.9 MALIGNANT NEOPLASM OF OVARY, UNSPECIFIED LATERALITY: ICD-10-CM

## 2025-01-30 RX ORDER — ALPRAZOLAM 0.5 MG/1
0.5 TABLET ORAL 2 TIMES DAILY
Qty: 60 TABLET | Refills: 0 | Status: SHIPPED | OUTPATIENT
Start: 2025-01-30

## 2025-02-14 PROBLEM — B96.89 BV (BACTERIAL VAGINOSIS): Status: ACTIVE | Noted: 2025-02-14

## 2025-02-14 PROBLEM — G25.81 RLS (RESTLESS LEGS SYNDROME): Status: ACTIVE | Noted: 2025-02-14

## 2025-02-14 PROBLEM — N76.0 BV (BACTERIAL VAGINOSIS): Status: ACTIVE | Noted: 2025-02-14

## 2025-02-14 PROBLEM — M81.8 DISUSE OSTEOPOROSIS: Status: ACTIVE | Noted: 2025-02-14

## 2025-02-14 PROBLEM — M81.0 AGE RELATED OSTEOPOROSIS: Status: ACTIVE | Noted: 2025-02-14

## 2025-02-25 DIAGNOSIS — C56.9 MALIGNANT NEOPLASM OF OVARY, UNSPECIFIED LATERALITY: ICD-10-CM

## 2025-02-25 DIAGNOSIS — C48.2 PRIMARY PERITONEAL ADENOCARCINOMA: ICD-10-CM

## 2025-02-25 RX ORDER — ALPRAZOLAM 0.5 MG/1
0.5 TABLET ORAL 2 TIMES DAILY
Qty: 60 TABLET | Refills: 0 | Status: SHIPPED | OUTPATIENT
Start: 2025-02-25

## 2025-02-25 RX ORDER — LIDOCAINE AND PRILOCAINE 25; 25 MG/G; MG/G
CREAM TOPICAL
Qty: 30 G | Refills: 1 | Status: SHIPPED | OUTPATIENT
Start: 2025-02-25

## 2025-02-25 NOTE — TELEPHONE ENCOUNTER
Refill Routing Note   Medication(s) are not appropriate for processing by [facility] for the following reason(s):        Outside of protocol    [facility] action(s):  Route             Appointments  past 12m or future 3m with PCP    Date Provider   Last Visit   6/7/2023 Rhonda Luque MD   Next Visit   Visit date not found Rhonda Luque MD   ED visits in past 90 days: 0        Note composed:8:49 AM 02/25/2025

## 2025-03-14 DIAGNOSIS — C48.2 PRIMARY PERITONEAL ADENOCARCINOMA: ICD-10-CM

## 2025-03-14 DIAGNOSIS — G89.3 CANCER RELATED PAIN: ICD-10-CM

## 2025-03-14 RX ORDER — GABAPENTIN 600 MG/1
600 TABLET ORAL 2 TIMES DAILY
Qty: 180 TABLET | Refills: 0 | Status: SHIPPED | OUTPATIENT
Start: 2025-03-14

## 2025-03-24 DIAGNOSIS — C48.2 PRIMARY PERITONEAL ADENOCARCINOMA: ICD-10-CM

## 2025-03-25 DIAGNOSIS — C48.2 PRIMARY PERITONEAL ADENOCARCINOMA: ICD-10-CM

## 2025-03-25 RX ORDER — LIDOCAINE AND PRILOCAINE 25; 25 MG/G; MG/G
CREAM TOPICAL
Qty: 30 G | Refills: 1 | Status: SHIPPED | OUTPATIENT
Start: 2025-03-25

## 2025-03-25 RX ORDER — LIDOCAINE AND PRILOCAINE 25; 25 MG/G; MG/G
CREAM TOPICAL
Qty: 30 G | Refills: 1 | Status: SHIPPED | OUTPATIENT
Start: 2025-03-25 | End: 2025-03-25

## 2025-03-25 NOTE — TELEPHONE ENCOUNTER
Refill Routing Note   Medication(s) are not appropriate for processing by [facility] for the following reason(s):        Outside of protocol  Clarification of medication (Rx) details    [facility] action(s):  Route    Pharmacy comment: max how many times per day.          Appointments  past 12m or future 3m with PCP    Date Provider   Last Visit   6/7/2023 Rhonda Luque MD   Next Visit   Visit date not found Rhonda Luque MD   ED visits in past 90 days: 0        Note composed:11:42 AM 03/25/2025

## 2025-03-25 NOTE — TELEPHONE ENCOUNTER
Refill Routing Note   Medication(s) are not appropriate for processing by [facility] for the following reason(s):        Outside of protocol    [facility] action(s):  Route             Appointments  past 12m or future 3m with PCP    Date Provider   Last Visit   6/7/2023 Rhonda Luque MD   Next Visit   Visit date not found Rhonda Luque MD   ED visits in past 90 days: 0        Note composed:7:08 AM 03/25/2025

## 2025-03-31 ENCOUNTER — TELEPHONE (OUTPATIENT)
Dept: GYNECOLOGIC ONCOLOGY | Facility: CLINIC | Age: 75
End: 2025-03-31
Payer: MEDICARE

## 2025-03-31 DIAGNOSIS — C56.9 MALIGNANT NEOPLASM OF OVARY, UNSPECIFIED LATERALITY: ICD-10-CM

## 2025-03-31 RX ORDER — ALPRAZOLAM 0.5 MG/1
0.5 TABLET ORAL 2 TIMES DAILY
Qty: 60 TABLET | Refills: 0 | Status: SHIPPED | OUTPATIENT
Start: 2025-03-31

## 2025-04-28 ENCOUNTER — TELEPHONE (OUTPATIENT)
Dept: PALLIATIVE MEDICINE | Facility: CLINIC | Age: 75
End: 2025-04-28
Payer: MEDICARE

## 2025-04-28 DIAGNOSIS — C56.9 MALIGNANT NEOPLASM OF OVARY, UNSPECIFIED LATERALITY: ICD-10-CM

## 2025-04-28 DIAGNOSIS — N32.81 OVERACTIVE BLADDER: ICD-10-CM

## 2025-04-28 RX ORDER — ALPRAZOLAM 0.5 MG/1
0.5 TABLET ORAL 2 TIMES DAILY
Qty: 60 TABLET | Refills: 0 | Status: CANCELLED | OUTPATIENT
Start: 2025-04-28

## 2025-04-28 RX ORDER — OXYBUTYNIN CHLORIDE 5 MG/1
5 TABLET ORAL 2 TIMES DAILY
Qty: 180 TABLET | Refills: 3 | Status: CANCELLED | OUTPATIENT
Start: 2025-04-28 | End: 2026-04-28

## 2025-04-28 NOTE — TELEPHONE ENCOUNTER
Refill or New Rx:oxybutynin (DITROPAN) 5 MG Tab    RX Name and Strength:ALPRAZolam (XANAX) 0.5 MG tablet    How is the patient currently taking it? (ex. 1XDay):    Is this a 30 day or 90 day RX:    Preferred Pharmacy with phone number:[facility] - [address]

## 2025-04-29 ENCOUNTER — TELEPHONE (OUTPATIENT)
Dept: GYNECOLOGIC ONCOLOGY | Facility: CLINIC | Age: 75
End: 2025-04-29
Payer: MEDICARE

## 2025-04-29 ENCOUNTER — PATIENT MESSAGE (OUTPATIENT)
Dept: GYNECOLOGIC ONCOLOGY | Facility: CLINIC | Age: 75
End: 2025-04-29
Payer: MEDICARE

## 2025-04-29 DIAGNOSIS — C56.9 MALIGNANT NEOPLASM OF OVARY, UNSPECIFIED LATERALITY: ICD-10-CM

## 2025-04-29 DIAGNOSIS — N32.81 OVERACTIVE BLADDER: ICD-10-CM

## 2025-04-29 RX ORDER — OXYBUTYNIN CHLORIDE 5 MG/1
5 TABLET ORAL 2 TIMES DAILY
Qty: 60 TABLET | Refills: 3 | Status: SHIPPED | OUTPATIENT
Start: 2025-04-29 | End: 2025-08-27

## 2025-04-29 RX ORDER — ALPRAZOLAM 0.5 MG/1
0.5 TABLET ORAL 2 TIMES DAILY
Qty: 60 TABLET | Refills: 0 | Status: SHIPPED | OUTPATIENT
Start: 2025-04-29

## 2025-04-30 ENCOUNTER — TELEPHONE (OUTPATIENT)
Dept: GYNECOLOGIC ONCOLOGY | Facility: CLINIC | Age: 75
End: 2025-04-30
Payer: MEDICARE

## 2025-04-30 NOTE — TELEPHONE ENCOUNTER
----- Message from Ezequiel sent at 4/30/2025  4:03 PM CDT -----  Regarding: Consult/Advisory  Contact: [phone]  Consult/Advisory Name Of Caller:  Murlene Haase  Contact Preference:  [phone] Nature of call: Pt is calling because [facility] is saying they haven't received her  ALPRAZolam (XANAX) 0.5 MG tablet script. She is trying to see if it can be called in to [facility]. Needs script called in today. Please let her know in the portal once it is done.  ----- Message -----  From: Ezequiel Figueroa  Sent: 4/30/2025   4:10 PM CDT  To: [facility] Staff  Subject: Consult/Advisory                                 Consult/Advisory Name Of Caller:  Murlene Haase  Contact Preference:  [phone] Nature of call: Pt is calling because [facility] is saying they haven't received her  ALPRAZolam (XANAX) 0.5 MG tablet script. She is trying to see if it can be called in to [facility]. Needs script called in today.  ----- Message -----  From: Ezequiel Figueroa  Sent: 4/30/2025   4:08 PM CDT  To: [facility] Staff  Subject: Consult/Advisory                                 Consult/Advisory Name Of Caller:  Murlene Haase  Contact Preference:  [phone] Nature of call: Pt is calling because [facility] is saying they haven't received her  ALPRAZolam (XANAX) 0.5 MG tablet script. She is trying to see if it can be called in to [facility].

## 2025-05-01 ENCOUNTER — TELEPHONE (OUTPATIENT)
Dept: GYNECOLOGIC ONCOLOGY | Facility: CLINIC | Age: 75
End: 2025-05-01
Payer: MEDICARE

## 2025-05-01 ENCOUNTER — OFFICE VISIT (OUTPATIENT)
Dept: PALLIATIVE MEDICINE | Facility: CLINIC | Age: 75
End: 2025-05-01
Payer: MEDICARE

## 2025-05-01 DIAGNOSIS — M79.7 FIBROMYALGIA: ICD-10-CM

## 2025-05-01 DIAGNOSIS — Z71.89 ADVANCED CARE PLANNING/COUNSELING DISCUSSION: ICD-10-CM

## 2025-05-01 DIAGNOSIS — M80.00XA OSTEOPOROSIS WITH CURRENT PATHOLOGICAL FRACTURE, UNSPECIFIED OSTEOPOROSIS TYPE, INITIAL ENCOUNTER: ICD-10-CM

## 2025-05-01 DIAGNOSIS — K58.2 IRRITABLE BOWEL SYNDROME WITH BOTH CONSTIPATION AND DIARRHEA: ICD-10-CM

## 2025-05-01 DIAGNOSIS — M54.42 ACUTE BILATERAL LOW BACK PAIN WITH BILATERAL SCIATICA: ICD-10-CM

## 2025-05-01 DIAGNOSIS — M54.41 ACUTE BILATERAL LOW BACK PAIN WITH BILATERAL SCIATICA: ICD-10-CM

## 2025-05-01 DIAGNOSIS — R53.0 NEOPLASTIC (MALIGNANT) RELATED FATIGUE: ICD-10-CM

## 2025-05-01 DIAGNOSIS — C48.2 PRIMARY PERITONEAL ADENOCARCINOMA: Primary | ICD-10-CM

## 2025-05-01 DIAGNOSIS — F43.23 ADJUSTMENT DISORDER WITH MIXED ANXIETY AND DEPRESSED MOOD: ICD-10-CM

## 2025-05-01 DIAGNOSIS — Z51.5 ENCOUNTER FOR PALLIATIVE CARE: ICD-10-CM

## 2025-05-01 DIAGNOSIS — G47.00 INSOMNIA, UNSPECIFIED TYPE: ICD-10-CM

## 2025-05-01 RX ORDER — LIDOCAINE 50 MG/G
OINTMENT TOPICAL 3 TIMES DAILY
Qty: 60 G | Refills: 2 | Status: SHIPPED | OUTPATIENT
Start: 2025-05-01

## 2025-05-01 RX ORDER — LIDOCAINE 50 MG/G
OINTMENT TOPICAL
Qty: 60 G | Refills: 2 | Status: SHIPPED | OUTPATIENT
Start: 2025-05-01 | End: 2025-05-01

## 2025-05-01 NOTE — PROGRESS NOTES
Consult Note  Palliative Care    The patient location is: home  The chief complaint leading to consultation is: symptom management and ACP    Visit type: audiovisual    Face to Face time with patient: 36 minutes    > 40 minutes of total time spent on the encounter, which includes face to face time and non-face to face time preparing to see the patient (eg, review of tests), Obtaining and/or reviewing separately obtained history, Documenting clinical information in the electronic or other health record, Independently interpreting results (not separately reported) and communicating results to the patient/family/caregiver, or Care coordination (not separately reported).     Each patient to whom he or she provides medical services by telemedicine is:  (1) informed of the relationship between the physician and patient and the respective role of any other health care provider with respect to management of the patient; and (2) notified that he or she may decline to receive medical services by telemedicine and may withdraw from such care at any time.    Reason for Consult: symptom management and ACP      ASSESSMENT/PLAN:     Plan/Recommendations:  Diagnoses and all orders for this visit:    Primary peritoneal adenocarcinoma  - followed by Dr. Luque; has not seen in long time  - initial diagnosis in 2013; please see oncology notes for full details of treatment  - has elected to stop disease directed therapy at this time; last chemotherapy treatment in March 2023  - at previous visit per patient she feels that she does not have cancer anymore. She reports not being in denial, but that she does not feel sick and chooses to believe that she does not have cancer anymore.   - discussed with patient today as well as at previous visits about if she were to follow up with Dr. Luque for testing/imaging to see state of the cancer and would that change anything for her decision wise. Patient states no, she would still not purse any  cancer directed therapies.   - today patient wanting affirmation that she made the correct choice; after ongoing discussion, patient notes she did make the correct choice for herself    Encounter for palliative care/Advance Care Planning   - patient decisional and herself today on telemedicine visit  - no ACP documents uploaded into EMR  - philosophy of Palliative Medicine and new patient folder information sent in mail to patient after first visit. Patient reports never receiving the packet in the mail. Will ask [facility] to mail new patient folder and ACP packet to Ms. Haase today. Will also try to upload into patient portal for patient to review  - patient verbally named her daughter Randee Robison as her HCPOA  - previously introduced philosophy of hospice care including services provided included but not limited to nursing visits, nursing aides, dme, medications, sw/, and 24 hour number. All questions/concerns were addressed.   - patient is not interested in enrolling in hospice at this time  - goals: improved quality of life and remaining independent. Patient states that she wants to prolong her life but limits to invasive therapies     Cancer related pain/Irritable bowel syndrome with both constipation and diarrhea/Fibromyalgia/Osteoporosis/fractures  - patient reporting moderate pain in her bones and muscles.   - imaging showing old compression fractures  - patient reports that she still is experiencing pain, especially in her back, neck, and shoulders  - patient has not been using gabapentin consistently  - discussed using 1/2 tab in AM and 1/2 tab midday then 1 whole tab at night. Patient noting that whole tab of gabapentin can make her feel increased drowsiness  - continue pain medication per another provider. Discussed in detail today as well as previously that this clinic is NOT able to prescribe controlled substances to her (eg. Pain medication, certain sleep aides, etc) as patient continues to  "decline to come to [place] to be seen in person due to transportation challenges.   - patient receiving hydrocodone-apap from local PCP  - prescription for 5% lidocaine sent today; discussed insurance might not cover  - patient stopped PT on her own  - patient uses OTC APAP and ibuprofen with some relief. Discussed that she should follow instructions on the bottle and not overuse due to side effects that can occur including liver damage, stomach issues, and kidney problems.  - patient following with orthopedist for osteoporosis and broken bones. She has elected to not pursue treatment of osteoporosis or surgery at this time.   - patient still having fluctuation in bowel movements so her abdomen will also become more bloated at times or bother her. Encouraged to follow up with GI specialist for management of IBS  - will continue to monitor    Adjustment disorder with mixed anxiety and depressed mood  - patient reporting moderate anxiety today; she states that she is feeling more anxious around finances and about ongoing symptom burden.  - she states she is usually trying to stay in a positive frame of mind. She does have increased thoughts/stress at night, which affects her sleep  - previously patient states that she wants peace and and doesn't want to ask God "why". She states it is harder to deal with all aspects of her cancer as this is the 4th time she is dealing with the disease  - emotional support provided today  - patient previously followed with EDOUARD Alarcon  - per Dr. Alarcon's note: patient has weaned herself off of zoloft  - patient did not find benefit from mirtazapine 15 mg qhs for sleep/mood  - patient rotated off fluoxetine and started on seroquel for sleep and mood  - continue xanax 0.25 mg as prescribed by another provider; defer all refills and adjustments to prescribing physician    Neoplastic (malignant) related fatigue/Insomnia  - patient with worsening insomnia and overall fatigue  - patient " trying to stay active but she is sometimes unable to complete all activities that she wants to. She spoke of wanting to feel better and more like she did previously  - patient reports that she has some racing thoughts at night as well as having pain that keeps her up  - ambien not helpful; doxepin not helpful; mirtazapine 15 mg qhs not helpful  - stopped fluoxetine  - started seroquel; has not noted any benefit  - discussed body scan as well as writing down her worries that are keeping her up at night    Understanding of illness/Prognosis: patient with good understanding of illness; prognosis is guarded    Goals of care: life prolonging    Follow up: ~ 12 weeks    SUBJECTIVE:     History of Present Illness:  Patient is a 75 y.o. year old female with fibromyalgia, insomnia, and primary peritoneal carcinomatosis presents to Palliative Medicine for symptom management and ACP. Please see oncology notes for full details of oncologic history and treatment course    05/01/2025:  LA  reviewed and summarized:  04/01/2025 Hydrocodone-apap 7.5-325 mg Disp: 90 for 30 days  03/31/2025 Alprazolam 0.5 mg Disp: 60 for 30 days  03/25/2025 Hydrocodone-apap 5-325 mg Disp: 21 for 7 days  03/17/2025 Gabapentin 600 mg Disp: 180 for 90 days  03/13/2025 Tramadol 50 mg Disp: 28 for 7 days  03/02/2025 Alprazolam 0.5 mg Disp: 60 for 30 days    History of Present Illness    CHIEF COMPLAINT:  - Patient presents virtually today for follow-up on chronic pain management and medication adjustments.    HISTORY OBTAINED FROM:  - Patient    HPI:  Patient reports ongoing pain related to broken bones, fibromyalgia, and bursitis, expressing frustration at limited mobility due to pain and insomnia. Her last chemotherapy treatment was in March 2023, which she discontinued due to severe side effects including extreme weakness and fatigue. She notes improvement since stopping chemotherapy but continues to struggle with pain management. Patient is  currently taking gabapentin, which she believes may need to be increased, and has been inconsistent with dosing. She also uses diclofenac sodium topical gel, lidocaine, and hydrocodone with acetaminophen for pain relief. Seroquel prescribed for sleep causes dizziness and drowsy. Patient desires to regain her previous quality of life but acknowledges challenges due to changes in her body from cancer treatments. She emphasizes her determination to stay active and maintain independence despite limitations.    GOALS OF CARE/ADVANCED DIRECTIVES:  - has stopped chemotherapy treatment, prioritizing quality of life  - Expresses a strong desire to maintain independence and continue daily activities despite pain and limitations  - Demonstrates understanding of her cancer prognosis, acknowledging the high recurrence rate and potential for disease progression  - Declined further cancer imaging or testing  - Goals focus on maintaining current quality of life and independence, rather than pursuing aggressive treatment  - Expresses a spiritual outlook, trusting in divine healing  - Lives independently at home and manages her own care    ACTIVITIES OF DAILY LIVING:  - Reports difficulty sleeping due to insomnia  - Able to go to the store, prepare food, and do house chores, though with difficulty due to pain  - Can work in the garden  - Experiences constipation as a side effect of pain medication  - Able to take medications independently, including breaking tablets in half when needed  - Maintains a balanced diet, trying to eat healthy foods and drink beneficial beverages  - Occasionally indulges in sugar-free candy    SOCIAL HISTORY:  - Patient mentions praying and trusting in the Lord for healing         Please see previous notes for full details of encounter on 05/04/2023; 05/29/2023; 06/15/2023; 08/31/2023; 03/04/2024; 08/19/2024; 12/02/2024;     Past Medical History:   Diagnosis Date    Arthritis     Back pain     BRCA2 gene  mutation positive in female 2018    Cataract     BILATERAL    Elevated cancer antigen 125 (CA-125) 2016    Fibromyalgia     Gastritis     History of chemotherapy     Insomnia due to medical condition 2021    Neuromuscular disorder     Osteopenia     Primary insomnia 2020    Primary peritoneal carcinomatosis 2013 AND 2017    Stage IIIC subopitimally debulked.     Well woman exam with routine gynecological exam 02/10/2015     Past Surgical History:   Procedure Laterality Date    APPENDECTOMY      BREAST BIOPSY       SECTION  I dont know    CHOLECYSTECTOMY      CYST REMOVAL      left ear, right breast    EYE SURGERY  I dont know    FIXATION KYPHOPLASTY N/A 2023    Procedure: KYPHOPLASTY T12 & L4 with BX;  Surgeon: Sudeep Arevalo MD;  Location: [facility] OR;  Service: Orthopedics;  Laterality: N/A;    HYSTERECTOMY      vaginal/prolapse    CT REMOVAL OF OVARY/TUBE(S)      SALPINGOOPHORECTOMY  2013    laparotomy     TONSILLECTOMY       Family History   Problem Relation Name Age of Onset    Cancer Mother Kanchan Price,         gastric cancer    Arthritis Mother Kanchan Price,     Heart disease Father Brandon Mason, 69        [place]    Cancer Paternal Aunt I Dont Know         gastric cancer    Ovarian cancer Neg Hx      Uterine cancer Neg Hx      Breast cancer Neg Hx      Colon cancer Neg Hx       Review of patient's allergies indicates:   Allergen Reactions    Carboplatin Hives and Shortness Of Breath    Diflucan [fluconazole] Rash       Medications:    Current Outpatient Medications:     ALPRAZolam (XANAX) 0.5 MG tablet, Take 1 tablet by mouth twice daily as needed for anxiety, Disp: 60 tablet, Rfl: 0    diclofenac (CATAFLAM) 50 MG tablet, Take 1 tablet (50 mg total) by mouth 3 (three) times daily as needed (sciatica)., Disp: 90 tablet, Rfl: 3    gabapentin (NEURONTIN) 600 MG tablet, Take 1 tablet by mouth twice daily, Disp: 180 tablet, Rfl: 0     HYDROcodone-acetaminophen (NORCO) 7.5-325 mg per tablet, Take 1 tablet by mouth every 8 (eight) hours as needed for Pain., Disp: 90 tablet, Rfl: 0    levocetirizine (XYZAL) 5 MG tablet, Take 5 mg by mouth every evening., Disp: , Rfl:     LIDOcaine-prilocaine (EMLA) cream, APPLY  CREAM TOPICALLY AS NEEDED, Disp: 30 g, Rfl: 1    metroNIDAZOLE (FLAGYL) 500 MG tablet, Take 1 tablet (500 mg total) by mouth 2 (two) times a day., Disp: 14 tablet, Rfl: 0    omeprazole (PRILOSEC) 40 MG capsule, Take 40 mg by mouth every morning., Disp: , Rfl:     oxybutynin (DITROPAN) 5 MG Tab, Take 1 tablet (5 mg total) by mouth 2 (two) times daily., Disp: 60 tablet, Rfl: 3    QUEtiapine (SEROQUEL) 50 MG tablet, Take 1 tablet (50 mg total) by mouth every evening., Disp: 90 tablet, Rfl: 1    traMADoL (ULTRAM) 50 mg tablet, Take 1 tablet (50 mg total) by mouth every 6 (six) hours as needed for Pain., Disp: 28 tablet, Rfl: 0  No current facility-administered medications for this visit.    Facility-Administered Medications Ordered in Other Visits:     LIDOcaine (PF) 10 mg/ml (1%) injection 10 mg, 1 mL, Intradermal, Once, Ward Arbaham MD    phenylephrine-ketorolac (OMIDRIA) 1-0.3 % 500 mL irrigation, 1 each, Ocular Irrigation, Once, Ward Abraham MD    OBJECTIVE:       ROS:  Review of Systems   Constitutional:  Positive for activity change and fatigue. Negative for appetite change.   HENT: Negative.     Eyes: Negative.    Respiratory: Negative.     Cardiovascular: Negative.    Gastrointestinal:  Positive for constipation and diarrhea. Negative for abdominal pain.   Genitourinary: Negative.    Musculoskeletal:  Positive for arthralgias, back pain and myalgias.        + neuropathic pain   Skin: Negative.    Neurological: Negative.    Psychiatric/Behavioral:  Positive for sleep disturbance. Negative for dysphoric mood. The patient is nervous/anxious.    All other systems reviewed and are negative.      Review of Symptoms      Symptom  Assessment (ESAS 0-10 Scale)  Pain:  7  Dyspnea:  0  Anxiety:  5  Nausea:  0  Depression:  4  Anorexia:  8  Fatigue:  8  Insomnia:  8  Restlessness:  0  Agitation:  0     CAM / Delirium:  Negative  Constipation:  Positive  Diarrhea:  Positive    Anxiety:  Is nervous/anxious  Constipation:  Constipation    Bowel Management Plan (BMP):  No      Pain Assessment:  OME in 24 hours:  10  Location(s): generalized    Generalized       Location: generalized        Quality: Aching        Quantity: 7/10 in intensity        Chronicity: Onset 1 (greater than) year(s) ago, gradually worsening        Aggravating Factors: Activity        Alleviating Factors: Acetaminophen and NSAIDs        Associated Symptoms: None    Modified Chuck Scale:  0    ECOG Performance Status ndGndrndanddndend:nd nd2nd Living Arrangements:  Lives with family    Psychosocial/Cultural:   See Palliative Psychosocial Note: Yes  Patient lives with her brother. Patient enjoys being active - shopping, visiting/helping people; gardening; reading the bible  **Primary  to Follow**  Palliative Care  Consult: No    Spiritual:  F - Carli and Belief:  Yes  I - Importance:  Very  C - Community:  Yes  A - Address in Care:  Needs met      Advance Care Planning   Advance Directives:   Living Will: No    [place]: No    Do Not Resuscitate Status: No    Medical Power of : No        Oral Declaration: Yes  Agent's Name:  Randee Robison   Agent's Contact Number:  [phone]    Decision Making:  Patient answered questions  Goals of Care: Advance Care Planning    Date: 05/01/2025    [facility]  I engaged the patient in a voluntary conversation about advance care planning and we specifically addressed what the goals of care would be moving forward, in light of the patient's change in clinical status, specifically ovarian cancer.  We did specifically address the patient's likely prognosis, which is poor.  We explored the patient's values and preferences for future care.   The patient endorses that what is most important right now is to focus on remaining as independent as possible and comfort and QOL     Accordingly, we have decided that the best plan to meet the patient's goals includes no further escalation in treatment    A total of 18 min was spent on advance care planning, goals of care discussion, emotional support, formulating and communicating prognosis and exploring burden/benefit of various approaches of treatment. This discussion occurred on a fully voluntary basis with the verbal consent of the patient and/or family.           Physical Exam: limited due to telemedicine visit  Vitals:    Physical Exam  Constitutional:       General: She is not in acute distress.     Appearance: She is not toxic-appearing.   HENT:      Head: Normocephalic and atraumatic.      Right Ear: External ear normal.      Left Ear: External ear normal.      Nose: Nose normal.      Mouth/Throat:      Mouth: Mucous membranes are moist.   Eyes:      General: No scleral icterus.        Right eye: No discharge.         Left eye: No discharge.   Neck:      Comments: Trachea midline  Pulmonary:      Effort: Pulmonary effort is normal. No respiratory distress.   Musculoskeletal:      Cervical back: Normal range of motion.      Comments: Sitting up in chair; moves upper extremities without difficulty   Skin:     Coloration: Skin is not jaundiced or pale.      Findings: No rash.   Neurological:      General: No focal deficit present.      Mental Status: She is alert and oriented to person, place, and time.      Cranial Nerves: No cranial nerve deficit.   Psychiatric:         Behavior: Behavior normal.         Thought Content: Thought content normal.         Judgment: Judgment normal.         Labs:  CBC:   WBC   Date Value Ref Range Status   07/20/2023 7.20 3.90 - 12.70 K/uL Final     Hemoglobin   Date Value Ref Range Status   07/20/2023 11.3 (L) 12.0 - 16.0 g/dL Final     Hematocrit   Date Value Ref Range  "Status   07/20/2023 33.9 (L) 37.0 - 48.5 % Final     MCV   Date Value Ref Range Status   07/20/2023 95 82 - 98 fL Final     Platelets   Date Value Ref Range Status   07/20/2023 242 150 - 450 K/uL Final       LFT:   Lab Results   Component Value Date    AST 41 (H) 07/19/2023    ALKPHOS 113 07/19/2023    BILITOT 0.5 07/19/2023       Albumin:   Albumin   Date Value Ref Range Status   07/19/2023 4.4 3.5 - 5.2 g/dL Final     Protein:   Total Protein   Date Value Ref Range Status   07/19/2023 8.1 6.3 - 8.2 g/dL Final       Radiology:I have reviewed all pertinent imaging results/findings within the past 24 hours.    10/26/2023 MRI Thoracic spine: "1. Abnormal marrow signal intensity involving the T7 and T8 vertebral bodies extending into the pedicles, suspicious for metastatic disease in this patient with history of ovarian cancer. 2. Multilevel compression fractures."    10/26/2023 MRI lumbar spine: "1. Old compression fracture at the edge of the film at T11. 2. Since the previous study there is been a kyphoplasty of the fracture known on the previous study at T12 3. Old compression fracture of L1 with a central disc protrusion at L1-2 with facet changes and moderate left and mild right neural foramina narrowing and mild spinal stenosis 4. Old mild compression fracture of the superior anterior aspect of L3 unchanged and new kyphoplasty of the known compression fracture of L4 since the previous study. 5. Small central disc bulges throughout contributing to mild to moderate narrowing of the neural foramina"    A total of 18 min was spent on advance care planning, goals of care discussion, emotional support, formulating and communicating prognosis and goals of care, exploring burden/benefit of various approaches of treatment. This discussion occurred on a fully voluntary basis with the verbal consent of the patient and/or family      This note was generated with the assistance of ambient listening technology. Verbal consent " was obtained by the patient and accompanying visitor(s) for the recording of patient appointment to facilitate this note. I attest to having reviewed and edited the generated note for accuracy, though some syntax or spelling errors may persist. Please contact the author of this note for any clarification.    Signature: Joyce Franco MD    Advance Care Planning     Date: 03/04/2024    Power of   I initiated the process of voluntary advance care planning today and explained the importance of this process to the patient.  I introduced the concept of advance directives to the patient, as well. Then the patient received detailed information about the importance of designating a Health Care Power of  (HCPOA). She was also instructed to communicate with this person about their wishes for future healthcare, should she become sick and lose decision-making capacity. The patient has not previously appointed a HCPOA. After our discussion, the patient has decided to complete a HCPOA and has appointed her daughter, health care agent:  Randee Robison  & health care agent number:  [phone]  I spent a total time of 16 minutes discussing this issue with the patient.

## 2025-05-02 ENCOUNTER — TELEPHONE (OUTPATIENT)
Dept: GYNECOLOGIC ONCOLOGY | Facility: CLINIC | Age: 75
End: 2025-05-02
Payer: MEDICARE

## 2025-05-16 PROBLEM — N32.81 OVERACTIVE BLADDER: Status: ACTIVE | Noted: 2025-05-16

## 2025-05-16 PROBLEM — Z00.00 ENCOUNTER FOR WELLNESS EXAMINATION IN ADULT: Status: ACTIVE | Noted: 2017-02-15

## 2025-05-16 PROBLEM — Z79.899 LONG-TERM USE OF HIGH-RISK MEDICATION: Status: ACTIVE | Noted: 2025-05-16

## 2025-05-16 PROBLEM — M25.511 ACUTE PAIN OF RIGHT SHOULDER: Status: ACTIVE | Noted: 2025-05-16

## 2025-05-16 PROBLEM — L25.9 CONTACT DERMATITIS: Status: ACTIVE | Noted: 2025-05-16

## 2025-05-16 PROBLEM — G89.3 CANCER RELATED PAIN: Status: ACTIVE | Noted: 2025-05-16

## 2025-05-16 PROBLEM — J30.2 SEASONAL ALLERGIES: Status: ACTIVE | Noted: 2025-05-16

## (undated) DEVICE — GLOVE BIOGEL ECLIPSE SZ 7.5

## (undated) DEVICE — SHEILD EYE PROTCT BLUE FLEX

## (undated) DEVICE — SOL IRRI STRL WATER 1000ML

## (undated) DEVICE — SYR 50CC LL

## (undated) DEVICE — CANNULA SUPERSOFT CO2 7FT

## (undated) DEVICE — ELECTRODE FOAM 535 TEARDROP